# Patient Record
Sex: FEMALE | Race: WHITE | NOT HISPANIC OR LATINO | Employment: FULL TIME
[De-identification: names, ages, dates, MRNs, and addresses within clinical notes are randomized per-mention and may not be internally consistent; named-entity substitution may affect disease eponyms.]

---

## 2017-07-29 ENCOUNTER — HEALTH MAINTENANCE LETTER (OUTPATIENT)
Age: 52
End: 2017-07-29

## 2018-05-06 ENCOUNTER — HEALTH MAINTENANCE LETTER (OUTPATIENT)
Age: 53
End: 2018-05-06

## 2020-02-28 ENCOUNTER — APPOINTMENT (OUTPATIENT)
Age: 55
Setting detail: DERMATOLOGY
End: 2020-02-28

## 2020-02-28 DIAGNOSIS — Z41.9 ENCOUNTER FOR PROCEDURE FOR PURPOSES OTHER THAN REMEDYING HEALTH STATE, UNSPECIFIED: ICD-10-CM

## 2020-02-28 PROCEDURE — OTHER BOTOX: OTHER

## 2020-02-28 NOTE — PROCEDURE: BOTOX
Price (Use Numbers Only, No Special Characters Or $): 177 Price (Use Numbers Only, No Special Characters Or $): 205

## 2021-10-11 ENCOUNTER — TRANSFERRED RECORDS (OUTPATIENT)
Dept: HEALTH INFORMATION MANAGEMENT | Facility: CLINIC | Age: 56
End: 2021-10-11

## 2022-01-10 ENCOUNTER — APPOINTMENT (OUTPATIENT)
Dept: URBAN - METROPOLITAN AREA CLINIC 256 | Age: 57
Setting detail: DERMATOLOGY
End: 2022-01-10

## 2022-09-13 ENCOUNTER — TRANSFERRED RECORDS (OUTPATIENT)
Dept: HEALTH INFORMATION MANAGEMENT | Facility: CLINIC | Age: 57
End: 2022-09-13

## 2023-05-10 ENCOUNTER — TRANSFERRED RECORDS (OUTPATIENT)
Dept: HEALTH INFORMATION MANAGEMENT | Facility: CLINIC | Age: 58
End: 2023-05-10

## 2023-05-10 LAB
ALT SERPL-CCNC: 16 IU/L (ref 0–32)
ALT SERPL-CCNC: 16 IU/L (ref 0–32)
AST SERPL-CCNC: 20 IU/L (ref 0–40)
AST SERPL-CCNC: 20 IU/L (ref 0–40)
CHOLESTEROL (EXTERNAL): 225 MG/DL (ref 100–199)
CHOLESTEROL (EXTERNAL): 225 MG/DL (ref 100–199)
CREATININE (EXTERNAL): 0.78 MG/DL (ref 0.57–1)
CREATININE (EXTERNAL): 0.78 MG/DL (ref 0.57–1)
GFR ESTIMATED (EXTERNAL): 88 ML/MIN/1.73
GFR ESTIMATED (EXTERNAL): 88 ML/MIN/1.73
GLUCOSE (EXTERNAL): 83 MG/DL (ref 70–99)
GLUCOSE (EXTERNAL): 83 MG/DL (ref 70–99)
HBA1C MFR BLD: 5.6 % (ref 4.8–5.6)
HBA1C MFR BLD: 5.6 % (ref 4.8–5.6)
HDLC SERPL-MCNC: 78 MG/DL
HDLC SERPL-MCNC: 78 MG/DL
HEP C HIM: NORMAL
HEP C HIM: NORMAL
LDL CHOLESTEROL CALCULATED (EXTERNAL): 131 MG/DL (ref 0–99)
LDL CHOLESTEROL CALCULATED (EXTERNAL): 131 MG/DL (ref 0–99)
POTASSIUM (EXTERNAL): 3.8 MMOL/L (ref 3.5–5.2)
POTASSIUM (EXTERNAL): 3.8 MMOL/L (ref 3.5–5.2)
TRIGLYCERIDES (EXTERNAL): 93 MG/DL (ref 0–149)
TRIGLYCERIDES (EXTERNAL): 93 MG/DL (ref 0–149)
TSH SERPL-ACNC: 0.86 UIU/ML (ref 0.45–4.5)
TSH SERPL-ACNC: 0.86 UIU/ML (ref 0.45–4.5)

## 2023-05-23 ENCOUNTER — TRANSFERRED RECORDS (OUTPATIENT)
Dept: HEALTH INFORMATION MANAGEMENT | Facility: CLINIC | Age: 58
End: 2023-05-23

## 2023-06-07 ENCOUNTER — TRANSFERRED RECORDS (OUTPATIENT)
Dept: HEALTH INFORMATION MANAGEMENT | Facility: CLINIC | Age: 58
End: 2023-06-07

## 2023-09-29 ENCOUNTER — OFFICE VISIT (OUTPATIENT)
Dept: SLEEP MEDICINE | Facility: CLINIC | Age: 58
End: 2023-09-29
Payer: COMMERCIAL

## 2023-09-29 VITALS
DIASTOLIC BLOOD PRESSURE: 72 MMHG | HEART RATE: 69 BPM | WEIGHT: 167 LBS | HEIGHT: 68 IN | BODY MASS INDEX: 25.31 KG/M2 | OXYGEN SATURATION: 100 % | SYSTOLIC BLOOD PRESSURE: 107 MMHG

## 2023-09-29 DIAGNOSIS — G47.20 CIRCADIAN RHYTHM DISORDER: Primary | ICD-10-CM

## 2023-09-29 PROCEDURE — 99205 OFFICE O/P NEW HI 60 MIN: CPT | Performed by: INTERNAL MEDICINE

## 2023-09-29 ASSESSMENT — SLEEP AND FATIGUE QUESTIONNAIRES
HOW LIKELY ARE YOU TO NOD OFF OR FALL ASLEEP WHILE SITTING INACTIVE IN A PUBLIC PLACE: MODERATE CHANCE OF DOZING
HOW LIKELY ARE YOU TO NOD OFF OR FALL ASLEEP WHILE SITTING QUIETLY AFTER LUNCH WITHOUT ALCOHOL: HIGH CHANCE OF DOZING
HOW LIKELY ARE YOU TO NOD OFF OR FALL ASLEEP WHILE WATCHING TV: SLIGHT CHANCE OF DOZING
HOW LIKELY ARE YOU TO NOD OFF OR FALL ASLEEP WHILE SITTING AND READING: HIGH CHANCE OF DOZING
HOW LIKELY ARE YOU TO NOD OFF OR FALL ASLEEP WHEN YOU ARE A PASSENGER IN A CAR FOR AN HOUR WITHOUT A BREAK: HIGH CHANCE OF DOZING
HOW LIKELY ARE YOU TO NOD OFF OR FALL ASLEEP WHILE SITTING AND TALKING TO SOMEONE: SLIGHT CHANCE OF DOZING
HOW LIKELY ARE YOU TO NOD OFF OR FALL ASLEEP WHILE LYING DOWN TO REST IN THE AFTERNOON WHEN CIRCUMSTANCES PERMIT: HIGH CHANCE OF DOZING
HOW LIKELY ARE YOU TO NOD OFF OR FALL ASLEEP IN A CAR, WHILE STOPPED FOR A FEW MINUTES IN TRAFFIC: SLIGHT CHANCE OF DOZING

## 2023-09-29 NOTE — PATIENT INSTRUCTIONS

## 2023-09-29 NOTE — PROGRESS NOTES
"    Name: Angi Gonsalez MRN# [id number]   Age: 57 year old YOB: 1965   PCP Darrion Kebede MD  [facility]  [address] [place] [address]  \[place] [address]    Date of Consultation: September 29, 2023  Consultation is requested by: No referring provider defined for this encounter. No ref. provider found  Primary care provider: No primary care provider on file.       Reason for Sleep Consult:     Angi Gonsalez is sent by No ref. provider found for a sleep consultation regarding management            Assessment and Plan:     Summary Sleep Diagnoses:  History of idiopathic hypersomnolence  Current adjustment disorder with severe anxiety and comorbid depression    Comorbid Diagnoses:  Idiopathic hypersomnolence suggested on past testing with side effects on stimulant therapy     Summary Recommendations(things to be done):  Assessment of current sleep schedule with sleep diaries and actigraphy-results will be provided through [facility].  Return to clinic in 3 to 4 months.           HISTORY PRESENT ILLNESS:     Angi Gonsalez is a 57 year old year old with previously diagnosed idiopathic hypersomnolence based on multiple sleep latency test with average latency of 7.25 minutes over 4 naps without sleep onset REM and pre- study sleep of 7 hours without significant sleep-related breathing disorder or sleep disruption.  Actigraphy was not done at that time. She has not had cardinal symptoms of narcolepsy such as sleep paralysis, sleep hallucinations or cataplexy.      After starting on modafinil for idiopathic hypersomnolence diagnosis she noted overstimulation \"hyper\" overtalkative behavior and discontinued it.  Patient has been on modafinil since that time. She was subsequently started on CPAP for sleep apnea diagnosed in [place] but did not feel benefit and discontinued therapy.     She has had several significant setbacks including divorce and relocation living alone depending upon her own resources.  In this " setting she has acknowledged increasing anxiety, periods of difficulty concentrating and concerns about her work quality.  She has just received a work related reprimand and acknowledged difficulty concentrating and to a lesser extent daytime sleepiness.  She is reaching out in part asking whether treatment of her idiopathic hypersomnolence would be helpful and was cautioned that given her response to modafinil and worsening anxiety, stimulant therapy would not be recommended.   We also indicated her concerns regarding anxiety and she will lesser extent depressed mood from adjustment reaction are contributing to her symptoms.  We are strongly recommending she secure a primary mental health provider and feels she would also benefit from cognitive behavioral therapy. She was also requesting a family medical leave of indicated that this might be more completely completed by her local provider.     Current sleep schedule 9-10 pm bedtime now getting up at noon without specific work-related tasks with total sleep time up to 14 hours a day on those days.      LUCIAN-7    Over the last 2 weeks, how often have you been bothered by the following problems?   Not at all -0       Several days -1                  More than half the days-2   Nearly every day -3    1. Feeling nervous, anxious or on edge 3      2. Not being able to stop or control worrying 3     3. Worrying too much about different things 3     4. Trouble relaxing 3     5. Being so restless that it is hard to sit still 1     6. Becoming easily annoyed or irritable  1      7. Feeling afraid as if something awful might happen 3        Total___17___    Cut points for:   Mild Anxiety =  5  Moderate= 10  Severe=  15        PREVIOUS SLEEP TESTING:  DATE: 5/2/2012 followed by MSLT  Sleep monitoring, standard polysomnography including frontal, central, occipital EEG derivation, extraocular movements, chin EMG, modified lead II, limb EMGs, pulse oximetry, nasal pressure,  femoral couple airflow, and respiratory effort.  Body position and snoring were monitored throughout the procedure.        SLEEP ARCHITECTURE:  The total recording time was 530 minutes with a total sleep time of 431.5 minutes with a sleep latency of nearly 50 minutes for lights-out time at 9:34 p.m.  The sleep efficiency was 81.4% due to some prolonged awakenings.  Arousal frequency was high-normal in the range of 22 per hour; 7.6% were spent in stage 1, 79.1 % in stage 2, 9.2% in N3, and 4.1% in stage REM.        RESPIRATORY:  No significant sleep apnea with an apnea-hypopnea index of 0.1 per hour and no nocturnal hypoxemia.  Baseline saturation was 95% with the lowest saturation at 93%, and even including respiratory effort-related arousals, the overall respiratory disturbance index was only 6.7 per hour.        ASSESSMENT:  Normal sleep study without significant obstructive sleep apnea.  Prolonged initial sleep latency with normal sleep architecture.  Total sleep time prior to nap studies was 7 hours and 11.5 minutes.  Total [place] Score: 17   Multiple Sleep Latency Test (MSLT):   This study was performed in order to evaluate for daytime sleepiness and was performed after 7 hours and 11 minutes of sleep and a typical recent reported sleep times of 7-8 hours.  Patient was awoken at 6:24 am which is 1-2 hours prior to normal awakening though first latency is normal.  Actigraphy was not performed prior to testing.  Total number of naps performed: 4   Mean sleep latency: 7.25 mins.  Sleep-onset REM periods: 0  Other:   EEG: No epileptiform activity was noted during this recording.  EKG: No significant cardiac arrhythmias were noted.  UTOX: Not performed                 SCALES:       EPWORTH SLEEPINESS SCALE         6/6/2012     8:00 AM    [place] Sleepiness Scale ( CONY Valdez  3418-5121<br>ESS - [place]/English - Final version - 21 Nov 07 - [facility].)   [place] Score (Sleep) 8           STOP BANG          10/8/2013     1:46 PM   STOP BANG Questionnaire (  2008, the American Society of Anesthesiologists, Inc. [employer]   B/P Clinic: 98/62   BMI Clinic: 24.87         PATIENT HEALTH QUESTIONNAIRE-9 (PHQ - 9)        10/30/2012     8:00 AM   PHQ-9 ([employer])   No Interest In Doing Things 0   Feeling Depressed 1   Trouble Sleeping 1   Tired / No Energy 3   No appetite or Over-Eating 0   Feeling Bad about Self 0   Trouble Concentrating 1   Moving Slow or Restless 3   Suicidal Thoughts 0   Total Score 9       Developed by Sb Mcguire, Aishwarya Blackmon, Joe Davenport and colleagues, with an educational anabelle from [employer] No permission required to reproduce, translate, display or distribute.        Allergies:    Allergies   Allergen Reactions    Morphine And Related     Penicillins     Sulfa Antibiotics             Problem List:     Patient Active Problem List   Diagnosis    Idiopathic hypersomnolence    Anxiety    Mild recurrent major depression (H)    Insomnia    Chronic allergic conjunctivitis since 2011    Hyperlipidemia LDL goal <130            MEDICATIONS:     Current Outpatient Medications   Medication Sig Dispense Refill    azithromycin (ZITHROMAX) 250 MG tablet Two tablets first day, then one tablet daily for four days 6 tablet 0    citalopram (CELEXA) 10 MG tablet Take 1 tablet (10 mg) by mouth daily 30 tablet 0    citalopram (CELEXA) 20 MG tablet Take 1 tablet (20 mg) by mouth daily 90 tablet 0    estradiol (ESTRACE) 1 MG tablet Take 1 tablet (1 mg) by mouth daily 90 tablet 0    30 tablet 0    metroNIDAZOLE (FLAGYL) 500 MG tablet Take 1 tablet (500 mg) by mouth 2 times daily 14 tablet 0        Problem List:  Patient Active Problem List    Diagnosis Date Noted    Chronic allergic conjunctivitis since 2011 08/20/2013     Priority: Medium    Hyperlipidemia LDL goal <130 08/20/2013     Priority: Medium    Anxiety 02/12/2013     Priority: Medium    Mild recurrent major depression  (H) 02/12/2013     Priority: Medium    Insomnia 02/12/2013     Priority: Medium    Idiopathic hypersomnolence 06/06/2012     Priority: Medium        Past Medical/Surgical History:  No past medical history on file.  Past Surgical History:   Procedure Laterality Date    COLONOSCOPY  3/6/2013    Procedure: COLONOSCOPY;;  Surgeon: Juan Daniel Sahu MD;  Location:  GI    GYN SURGERY      hyst    TUBAL LIGATION         Social History:  Social History     Socioeconomic History    Marital status:      Spouse name: Not on file    Number of children: Not on file    Years of education: Not on file    Highest education level: Not on file   Occupational History    Not on file   Tobacco Use    Smoking status: Never    Smokeless tobacco: Never   Substance and Sexual Activity    Alcohol use: Yes     Comment: 1/every two weeks    Drug use: No    Sexual activity: Yes     Partners: Male   Other Topics Concern    Parent/sibling w/ CABG, MI or angioplasty before 65F 55M? Not Asked   Social History Narrative    Not on file     Social Determinants of Health     Financial Resource Strain: Not on file   Food Insecurity: Not on file   Transportation Needs: Not on file   Physical Activity: Not on file   Stress: Not on file   Social Connections: Not on file   Interpersonal Safety: Not on file   Housing Stability: Not on file       Family History:  Family History   Problem Relation Age of Onset    Cancer Father         bladder    Cancer Maternal Grandfather         liver    Cancer - colorectal Paternal Aunt     Cancer - colorectal Paternal Uncle             Physical Examination:     Mandibular profile    GENERAL: Healthy, alert and no distress  EYES: Eyes grossly normal to inspection.  No discharge or erythema, or obvious scleral/conjunctival abnormalities.  RESP: No audible wheeze, cough, or visible cyanosis.  No visible retractions or increased work of breathing.    SKIN: Visible skin clear. No significant rash, abnormal pigmentation  or lesions.  NEURO: Cranial nerves grossly intact.  Mentation and speech appropriate for age.  PSYCH: Mentation appears normal, affect normal/bright, judgement and insight intact, normal speech and appearance well-groomed.           LOTTIE GAYLE MD 9/29/2023     Total time spent reviewing medical records including previous testing and interpretation as well as direct patient contact and documentation on this date: 60 min

## 2023-10-02 ENCOUNTER — TELEPHONE (OUTPATIENT)
Dept: SCHEDULING | Facility: CLINIC | Age: 58
End: 2023-10-02
Payer: COMMERCIAL

## 2023-10-02 NOTE — TELEPHONE ENCOUNTER
General Call    Contacts         Type Contact Phone/Fax    10/02/2023 01:50 PM CDT Phone (Outgoing) Angi Canales (Self) [phone] (H)          Reason for Call:  Pt needing sleep study scheduled, actigraphy noted on order. Please contact.     What are your questions or concerns:  na    Date of last appointment with provider: na    Okay to leave a detailed message?: Yes at Cell number on file:    Telephone Information:   Mobile [phone]

## 2023-10-03 ENCOUNTER — HOSPITAL ENCOUNTER (EMERGENCY)
Facility: CLINIC | Age: 58
Discharge: HOME | End: 2023-10-03
Attending: FAMILY MEDICINE | Admitting: FAMILY MEDICINE
Payer: COMMERCIAL

## 2023-10-03 ENCOUNTER — TELEPHONE (OUTPATIENT)
Dept: EMERGENCY MEDICINE | Facility: CLINIC | Age: 58
End: 2023-10-03
Payer: COMMERCIAL

## 2023-10-03 VITALS
OXYGEN SATURATION: 98 % | RESPIRATION RATE: 16 BRPM | HEART RATE: 67 BPM | SYSTOLIC BLOOD PRESSURE: 104 MMHG | DIASTOLIC BLOOD PRESSURE: 68 MMHG | TEMPERATURE: 98.6 F

## 2023-10-03 DIAGNOSIS — F41.8 DEPRESSION WITH ANXIETY: ICD-10-CM

## 2023-10-03 PROBLEM — F32.9 MAJOR DEPRESSION: Status: ACTIVE | Noted: 2023-10-03

## 2023-10-03 PROCEDURE — 99284 EMERGENCY DEPT VISIT MOD MDM: CPT | Performed by: FAMILY MEDICINE

## 2023-10-03 PROCEDURE — 99283 EMERGENCY DEPT VISIT LOW MDM: CPT | Performed by: FAMILY MEDICINE

## 2023-10-03 ASSESSMENT — ACTIVITIES OF DAILY LIVING (ADL)
ADLS_ACUITY_SCORE: 35
ADLS_ACUITY_SCORE: 33
ADLS_ACUITY_SCORE: 35

## 2023-10-03 NOTE — CONSULTS
Diagnostic Evaluation Consultation  Crisis Assessment    Patient Name: Angi Canales  Age:  57 year old  Legal Sex: female  Gender Identity: female  Race: White  Ethnicity: Choose not to answer  Language: English      Patient was assessed: In person      Patient location: [facility] EMERGENCY DEPARTMENT                                 Referral Data and Chief Complaint  Angi Canales presents to the ED by  self. Patient is presenting to the ED for the following concerns: Depression.   Factors that make the mental health crisis life threatening or complex are:  Patient endorses significant recent stressors including her father's death, divorce from her , relocating to [place] from [place], finding a new apartment and starting a new job.    Informed Consent and Assessment Methods  Explained the crisis assessment process, including applicable information disclosures and limits to confidentiality, assessed understanding of the process, and obtained consent to proceed with the assessment.  Assessment methods included conducting a formal interview with patient, review of medical records, collaboration with medical staff, and obtaining relevant collateral information from family and community providers when available.  : done     Patient response to interventions: eager to participate, acceptance expressed, verbalizes understanding  Coping skills were attempted to reduce the crisis:  Patient has reached out to her [facility], [facility] adult walk in clinic, and primary care provider. Patient scheduled her own individual therapy and medication management but report these appointments are not until the end of the month.     History of the Crisis   Patient endorses significant recent stressors including her father's death, divorce from her , relocating to [place] from [place], finding a new apartment and starting a new job.Patient reports memory concerns such as forgetting where she is while  "driving and making \"sloppy\" spreadsheets at work. Patient reports she had a normal brain MRI but continues to struggle with \"comprehending, learning, and retaining\". Patient reports inability to \"get my thoughts together\". Patient reports seeing new providers this past week who suggested her concerns might be mental health related. Patient reports this was a \"real wake up call\" and she is having a \"hard time accepting\" the news. Patient reports contacting [employer] who suggested a FMLA leave. Patient reports the soonest outpatient therapy and medication management appointments she could find were a month out. Patient reports calling the [facility] walk in crisis center and ultimately presenting to our emergency department. Patient reports she is \"spiraling\" and needing help now.    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 she can \"barely function at work\")  Source of Income:  salary/wages  Financial Environmental Concerns:  unable to afford rent/mortgage  Current Hobbies:  family functions  Barriers in Personal Life:  mental health concerns, financial concerns, medical conditions/precautions    Significant Clinical History  Current Anxiety Symptoms:  anxious, excessive worry, racing thoughts  Current Depression/Trauma:  sense of doom, difficulty concentrating, crying or feels like crying, impaired decision making, helplessness  Current Somatic Symptoms:     Current Psychosis/Thought Disturbance:  forgetful, distractability  Current Eating Symptoms:     Chemical Use History:  Alcohol: Social (\"mindful drinking\" 3 days a week)  Benzodiazepines: None  Opiates: None  Cocaine: None  Marijuana: None  Other Use: None   Past diagnosis:  Anxiety Disorder, Depression  Family history:   (ex- with psychiatric " hospitalizations.)  Past treatment:  No known formal treatment attempts  Details of most recent treatment:  Patient denies any established outpatient mental health or medical providers at this time. Patient reports establishing primary care in [place] earlier this week.  Other relevant history:        Collateral Information  Is there collateral information:  Unclear who patient's emergency contact is in relation to patient, so  did not call.     Risk Assessment  [place] Suicide Severity Rating Scale Full Clinical Version:  Suicidal Ideation  Q1 Wish to be Dead (Lifetime): No  Q2 Non-Specific Active Suicidal Thoughts (Lifetime): No     Suicidal Behavior (Lifetime)  Actual Attempt (Lifetime): No  Has subject engaged in non-suicidal self-injurious behavior? (Lifetime): No  Interrupted Attempts (Lifetime): No  Aborted or Self-Interrupted Attempt (Lifetime): No  Preparatory Acts or Behavior (Lifetime): No    [place] Suicide Severity Rating Scale Recent:   Suicidal Ideation (Recent)  Q1 Wished to be Dead (Past Month): no  Q2 Suicidal Thoughts (Past Month): no  Q3 Suicidal Thought Method: no  Q4 Suicidal Intent without Specific Plan: no  Q5 Suicide Intent with Specific Plan: no  Level of Risk per Screen: low risk     Suicidal Behavior (Recent)  Actual Attempt (Past 3 Months): No  Has subject engaged in non-suicidal self-injurious behavior? (Past 3 Months): No  Interrupted Attempts (Past 3 Months): No  Aborted or Self-Interrupted Attempt (Past 3 Months): No  Preparatory Acts or Behavior (Past 3 Months): No    Environmental or Psychosocial Events: helplessness/hopelessness, loss of a relationship due to divorce/separation, loss of a loved one  Protective Factors: Protective Factors: help seeking    Does the patient have thoughts of harming others? Feels Like Hurting Others: no  Previous Attempt to Hurt Others: no  Is the patient engaging in sexually inappropriate behavior?: no    Is the patient engaging in  sexually inappropriate behavior?  no        Mental Status Exam   Affect: Appropriate  Appearance: Appropriate  Attention Span/Concentration: Attentive  Eye Contact: Engaged    Fund of Knowledge: Appropriate   Language /Speech Content: Fluent  Language /Speech Volume: Normal  Language /Speech Rate/Productions: Normal  Recent Memory: Intact  Remote Memory: Intact  Mood: Depressed  Orientation to Person: Yes   Orientation to Place: Yes  Orientation to Time of Day: Yes  Orientation to Date: Yes     Situation (Do they understand why they are here?): Yes  Psychomotor Behavior: Normal  Thought Content: Clear  Thought Form: Intact       Medication  Psychotropic medications:   Medication Orders - Psychiatric (From admission, onward)      None             Current Care Team  Patient Care Team:  No Ref-Primary, Physician as PCP - General    Diagnosis  Patient Active Problem List   Diagnosis Code    Idiopathic hypersomnolence G47.11    Anxiety F41.9    Mild recurrent major depression (H24) F33.0    Insomnia G47.00    Chronic allergic conjunctivitis since 2011 H10.45    Hyperlipidemia LDL goal <130 E78.5    Major depression F32.9       Primary Problem This Admission  Active Hospital Problems    *Major depression        Clinical Summary and Substantiation of Recommendations   Patient is alert and oriented x4. Patient is adequately dressed and groomed. Patient has articulate speech with normal rate and volume. Patient has depressed mood and normal affect. Patient was cooperative with the assessment process. Patient cites numerous stressor(s) including father's passing, marital divorce, selling their home, relocating to [place] from [place], starting a new job, living with her cousin until her apartment is ready next week. Patient reports she has been trying to establish primary care, therapy and medication management. Patient is also trying to obtain FMLA. Patient has reached out to employee assistance, crisis centers, and  "scheduled outpatient mental health appointments that are weeks out. Patient endorses worsening anxiety, feeling as though she is \"spiraling\" and \"can't turn her head off\". Patient denies any suicidal thought,  homicidal thought, or psychosis. Patient endorses history of alcohol abuse, practices \"mindful drinking\" 3 days per week now. Patient is seeking intensive mental health resources. Patient was offered programmatic care, sooner medication management appointment, and transition clinic which she is in agreement with.       Patient coping skills attempted to reduce the crisis:  Patient has reached out to her [facility], [facility] walk in clinic, and primary care provider. Patient scheduled her own individual therapy and medication management but report these appointments are not until the end of the month.    Disposition  Recommended disposition: Medication Management, Programmatic Care        Reviewed case and recommendations with attending provider. Attending Name: Dr. Salvador Hamilton       Attending concurs with disposition: yes       Patient and/or validated legal guardian concurs with disposition:   yes       Final disposition:  discharge    Legal status on admission:      Assessment Details   Total duration spent with the patient: 30 min     CPT code(s) utilized: 41043 - Psychotherapy for Crisis - 60 (30-74*) min    RAYMUNDO García, Psychotherapist  [facility] - Triage & Transition Services  Callback: [phone]  "

## 2023-10-03 NOTE — ED PROVIDER NOTES
"ED Provider Note  [facility]      History     Chief Complaint   Patient presents with    Depression    Anxiety     HPI  Angi Canales is a 57 year old female who has a history of depression, anxiety, idiopathic hypersomnia.  Patient recently developing progressively worsening depression and anxiety due to multiple stressors.  She started proceedings for divorce in May, and moved from [place] back to [place].  She is renting a room from her cousin.  She works in customer relations and is having a hard time completing her activity necessary for her job due to her mental health symptoms.  Patient reports anhedonia, sleeping poorly, difficulty concentrating, feelings of hopelessness and worthlessness.  She denies any suicidal thoughts or symptoms of psychosis.  She does not use drugs or alcohol.  She went to her sleep specialist, who referred her to her primary doctor.  She saw the primary doctor for the first time, who referred her to a therapist and a psychiatrist but she could not get appointments until later in the month.  The doctor gave her short-term work release but stated he could not help her with FMLA paperwork because she was a new patient without an established relationship with the physician and will need to see the psychiatrist and the therapist for those decisions and paperwork.  She subsequently went to the [facility] walk-in clinic who stated they could not help her.  She also spoke with the person from her employee assistance program who was unhelpful.  She is fearful of losing her job due to inability to perform the activities of her job related to her mental health symptoms.  At this point she stated \"I do not know what else to do so I walked into the hospital\".    Past Medical History  History reviewed. No pertinent past medical history.  Past Surgical History:   Procedure Laterality Date    COLONOSCOPY  3/6/2013    Procedure: COLONOSCOPY;;  " Surgeon: Juan Daniel Sahu MD;  Location:  GI    GYN SURGERY      hyst    TUBAL LIGATION       citalopram (CELEXA) 20 MG tablet  estradiol (ESTRACE) 1 MG tablet      Allergies   Allergen Reactions    Morphine And Related     Penicillins     Sulfa Antibiotics      Family History  Family History   Problem Relation Age of Onset    Cancer Father         bladder    Cancer Maternal Grandfather         liver    Cancer - colorectal Paternal Aunt     Cancer - colorectal Paternal Uncle      Social History   Social History     Tobacco Use    Smoking status: Never    Smokeless tobacco: Never   Substance Use Topics    Alcohol use: Yes     Comment: 1/every two weeks    Drug use: No         A medically appropriate review of systems was performed with pertinent positives and negatives noted in the HPI, and all other systems negative.    Physical Exam   BP: 123/79  Pulse: 65  Temp: 96.8  F (36  C)  Resp: 14  SpO2: 99 %  Physical Exam  General: Patient is in no acute distress and is resting comfortably.  HEENT: Normocephalic atraumatic  Neck: Supple  Cardiovascular: Heart rate normal  Pulmonary: Patient is in no respiratory distress  Extremities: No signs of any significant or life-threatening trauma.  Neurologic: No new focal neurologic deficits.  Psychiatric: Depressed mood flat affect.  Speech fluent, English.  Thought processes logical.  No loose associations.  Thought content normal.  Insight and judgment normal.  No suicidal or homicidal ideation.  No psychosis.    ED Course, Procedures, & Data      Procedures                     No results found for any visits on 10/03/23.  Medications - No data to display  Labs Ordered and Resulted from Time of ED Arrival to Time of ED Departure - No data to display  No orders to display          Critical care was not performed.     Medical Decision Making  The patient's presentation was of moderate complexity (a chronic illness mild to moderate exacerbation, progression, or side effect of  treatment).    The patient's evaluation involved:  ordering and/or review of 1 test(s) in this encounter (see separate area of note for details)  discussion of management or test interpretation with another health professional (DEC )    The patient's management necessitated high risk (a decision regarding hospitalization).    Assessment & Plan    A 57-year-old patient with a history of depression and anxiety which is growing progressively worse since May, and specifically worse in the last 2 weeks.  He has multiple symptoms of major depression and a lot of anxiety around her social situation and her job.  Is not suicidal, homicidal, nor psychotic and has no issues with substance abuse.  Her symptoms are quite disabling and making her unable to perform her work which is causing an increase in her symptoms.  Ultimately attempts at access to outpatient services have not provided her with a satisfactory benefit or resolved and so she presents to the ED.  The patient was also seen by the [facility] , please refer to their extensive note/evaluation which was reviewed with me and is documented in EPIC on 10/3/2023 for further details.  Who is significantly impaired by depression and anxiety, numerous stressors, exacerbated by employment status, housing status, relation relationship problems.  She is not suicidal or homicidal and does not have any psychotic symptoms.  There are no substance use issues.  She is appropriate for outpatient referrals.  She has significant concerns regarding FMLA paperwork.  This is more appropriately given by outpatient providers with continuity.  I did give her a temporary work letter in hopes that this will bridge until her FMLA with can be filled out by the appropriate providers.  We discussed the indications for emergency department return and follow-up.  Stable for discharge.      I have reviewed the nursing notes. I have reviewed the findings, diagnosis, plan and need for follow  up with the patient.    New Prescriptions    No medications on file       Final diagnoses:   Depression with anxiety       Salvador Hamilton MD  [facility] EMERGENCY DEPARTMENT  10/3/2023     Salvador Hamilton MD  10/03/23 1522

## 2023-10-03 NOTE — TELEPHONE ENCOUNTER
Pt is a(n) adult (18+ out of HS) Seeking as eval for Adult Mental Health DA for evaluation and recommendations..  Appointment scheduled by:  Patient.  (self-pay - complete Cost Estimate)  Caller name:  Angi Canales    Caller phone #: [phone]  Legal Guardianship Reviewed?  No  Honoring Choices Notified?  No  Brief reason for appt:  DA for programmatic care recommendations and referrals     needed?  NO    Contact information verified/updated: Yes    Treva Boyer

## 2023-10-03 NOTE — DISCHARGE INSTRUCTIONS
Mental Health Resources and Appointments     Diagnostic Assessment - [facility]  A diagnostic assessment has been scheduled for the [facility] Child/Adolescent Mental Health Programming on the date of 10/10/23 at 8:00 AM.  This appointment will be conducted in person at our [place] Location. The [facility] has been given your contact information and will reach out to you to confirm your appointment. You may ask them insurance coverage questions.      Location Instructions  The Assessment Center is located at the [facility], [facility], on the [facility].    Please come to the [facility] entrance near the Emergency Department.  Follow the signs to the Emergency Room and park in the RED or YELLOW parking ramp.  Enter in the [facility].  The Assessment Center is next to [address].    -If getting a ride, please request drop off at the address above.  -If driving, discounted parking is available in the Red or Yellow ramp across from the [facility] entrance.    Our reception area is conveniently located as you come into the [facility] in [address], next door to [address].  Please bring a current list of medication and contact information for your other providers.    Please note, we do ask for a minimum of a 24-hour notice for canceled or rescheduled appointments     Department Address  [address] [place] [id number]     If you need to reschedule or cancel this appointment, please call Behavioral Access at [phone]. If you must cancel, please call at least 24 hours prior to your scheduled appointment. Please note that this assessment is needed to determine eligibility for [facility]. If you are determined to not be an appropriate fit for programming, please feel free to call [facility] at [phone] to speak with one of our coordinators on other programming options.       Medication Management Appointment  Date: Tuesday, 10/10/2023  Time: 1:00 pm - 2:00 pm  Provider: Cynthia DE GUZMAN  CNP,RN  Location: [facility] [address], [address], [place] [address]  Phone: [phone]  Type: Medication Mgmt - Initial (In-Person)    Patient Instructions  NEW PATIENT PAPERWORK WILL NEED TO BE COMPLETED 24 HRS BEFORE APPOINTMENT..ID & INSURANCE CARDS WILL NEED TO BE PRESENT FOR THE APPOINTMENT!!    Aftercare Plan    If I am feeling unsafe or I am in a crisis, I will:   Contact my established care providers   Call the [place] Suicide Prevention Lifeline: 988  Go to the nearest emergency room   Call 911     Warning signs that I or other people might notice when a crisis is developing for me: any worsening mental health or substance use concerns    Things I am able to do on my own to cope or help me feel better: Grounding Techniques:  Try to notice where you are, your surroundings including the people, the sounds like the TV or radio.  Concentrate on your breathing. Take a deep cleansing breath from your diaphragm. Count the breaths as you exhale. Make sure you breath slowly.  Hold something that you find comforting, for some it may be a stuffed animal or a blanket. Notice how it feels in your hands. Is it hard or soft?  During a non-crisis time make a list of positive affirmations. Print them out and keep them handy for times of intense anxiety. At those times, read them aloud.  Try the [employer] game:  Name 5 things you can see in the room with you  Name 4 things you can feel ( chair on my back  or  feet on floor )   Name 3 things you can hear right now ( people talking  or  tv )   Name 2 things you can smell right now (or, 2 things you like the smell of)   Name 1 good thing about yourself  Create A Safe Place  Image a safe place -- it can be a real or imaginary place:   What do you see -- especially colors?   What sounds do you hear?   What sensations do you feel?    What smells do you smell?   What people or animals would you want in your safe place?   Imagine a protective bubble, wall or boundary around your safe place.   Imagine a door or gate with a guard at your safe place.   Image a lock and key to your safe place and only you can unlock it.  You can draw or make a collage that represents your safe place.   Choose a souvenir of your safe place -- a color, an object, a song.   Keep your image of your safe place so you can come back to it when you need to.    Things that I am able to do with others to cope or help me better: let your family know if you are needing more support     Things I can use or do for distraction: Reduce Extreme Emotion  QUICKLY:  Changing Your Body Chemistry      T:  Change your body Temperature to change your autonomic nervous system   Use Ice Water to calm yourself down FAST   Put your face in a bowl of ice water (this is the best way; have the person keep his/her face in ice water for 30-45 seconds - initial research is showing that the longer s/he can hold her/his face in the water, the better the response), or   Splash ice water on your face, or hold an ice pack on your face      I:  Intensely exercise to calm down a body revved up by emotion   Examples: running, walking fast, jumping, playing basketball, weight lifting, swimming, calisthenics, etc.   Engage in exercises that DO NOT include violent behaviors. Exercises that utilize violent behaviors tend to function as  behavioral rehearsal,  and rather than calming the person down, may actually  rev  the person up more, increasing the likelihood of violence, and lessening the likelihood that they will  burn off  energy     P:  Progressively relax your muscles   Starting with your hands, moving to your forearms, upper arms, shoulders, neck, forehead, eyes, cheeks and lips, tongue and teeth, chest, upper back, stomach, buttocks, thighs, calves, ankles, feet   Tense (10 seconds,   of the way),  "then relax each muscle (all the way)   Notice the tension   Notice the difference when relaxed (by tensing first, and then relaxing, you are able to get a more thorough relaxation than by simply relaxing)     P: Paced breathing to relax   The standard technique is to begin with counting the number of steps one takes for a typical inhale, then counting the steps one takes for a typical exhale, and then lengthening the amount of steps for the exhalation by one or two steps.  OR  Repeat this pattern for 1-2 minutes  Inhale for four (4) seconds   Exhale for six (6) to eight (8) seconds   Research demonstrated that one can change one's overall level of anxiety by doing this exercise for even a few minutes per day     Changes I can make to support my mental health and wellness: begin mental health treatment programming and medication management as discussed    People in my life that I can ask for help: primary care provider    Your [facility] has a mental health crisis team you can call 24/7: [facility]  [phone]    Other things that are important when I'm in crisis: continue taking medication as prescribed     Additional resources and information:     Crisis Lines  Crisis Text Line  Text 441444  You will be connected with a trained live crisis counselor to provide support.    Por espanol, texto  RAE a 059821 o texto a 442-AYUDAME en WhatsApp    The [employer] Project (LGBTQ Youth Crisis Line)  9.017.747.3419  text START to 746-383      [employer]  Fast Tracker  Linking people to mental health and substance use disorder resources  fasttrackClusterSevenn.org     [place] Mental Health Warm Line  Peer to peer support  Monday thru Saturday, 12 pm to 10 pm  480.232.8669 or 3.011.345.9794  Text \"Support\" to 43755    National [place] on Mental Illness (MACRINA)  038.858.1205 or 1.888.MACRINA.HELPS      Mental Health Apps  My3  https://myFloor64.org/    [employer]  " https://Promolta/apps/virtual-hope-box/      Additional Information  Today you were seen by a licensed mental health professional through Triage and Transition services, [facility] (P)  for a crisis assessment in the Emergency Department at [facility].  It is recommended that you follow up with your established providers (psychiatrist, mental health therapist, and/or primary care doctor - as relevant) as soon as possible. Coordinators from [facility] will be calling you in the next 24-48 hours to ensure that you have the resources you need.  You can also contact [facility] coordinators directly at [phone]. You may have been scheduled for or offered an appointment with a mental health provider. [facility] maintains an extensive network of licensed behavioral health providers to connect patients with the services they need.  We do not charge providers a fee to participate in our referral network.  We match patients with providers based on a patient's specific needs, insurance coverage, and location.  Our first effort will be to refer you to a provider within your care system, and will utilize providers outside your care system as needed.

## 2023-10-03 NOTE — LETTER
October 3, 2023      To Whom It May Concern:      Angi Canales was seen in our Emergency Department today, 10/03/23.  I expect her condition to improve over the next 7 days.  Her condition may require even further time away from work, this is to be determined at her follow-up appointments.  She may return to work/school when the patient providers deem her appropriately improved.    Sincerely,        Salvador Hamilton MD

## 2023-10-03 NOTE — ED TRIAGE NOTES
pt here for mental health; pt admits to increased anxiety, depression, hard time functioning home and work, increased sleeping; Denies SI, hallucinations, paranoia, drug use;

## 2023-10-04 ENCOUNTER — TELEPHONE (OUTPATIENT)
Dept: BEHAVIORAL HEALTH | Facility: CLINIC | Age: 58
End: 2023-10-04
Payer: COMMERCIAL

## 2023-10-04 NOTE — TELEPHONE ENCOUNTER
First attempt to contact patient.Writer spoke with patient who stated they were going to call insurance and Cost of Care Line: [phone] to check coverage. Patient stated they would call back if appointment was needed. TC contact provided.    Candi Moya  10/04/2023  330

## 2023-10-04 NOTE — TELEPHONE ENCOUNTER
----- Message from Treva Boyer sent at 10/3/2023  8:21 PM CDT -----  Regarding: Patient Referral  Transition Clinic Referral   [place]         Please Check Type of Referral Requested:       __x__THERAPY: The Transition clinic is able to schedule patients without current medical insurance; these patient will be referred to our Social Work Care Coordinator for Medical Insurance              Assistance. We are open for referral for psychotherapy. Patient is referred from:  [facility]      ____MEDICATION:  Referrals for Medication are ONLY accepted from the following areas (select): Emergency Department/Urgent Care - HIGH PRIORITY                                       Suboxone and Opioid Management Referrals are automatically denied. TC Psychiatry cannot see patient without active medical insurance.      Next level of psychiatry care must be within 12 months.  TC Psychiatry cannot accept patient with next level of care scheduled with PCP.  The transition clinic cannot follow patients who are on a restricted recipient program.    GUARDIAN: If your patient is not their own Guardian, please provide the following:    Guardian Name:  Guardian Contact Information (Phone & Email) :  Guardian Address:     FOSTER CARE PROVIDER: If your patient lives at a Licensed Foster Care, please provide the following:    Foster Provider:  Foster Provider Contact Information (Phone & Email):  Foster Provider Address:       Referring Provider Contact Name: Jorge Ngo; Phone Number: [phone]    Reason for Transition Clinic Referral: Therapeutic support between ED visit and scheduled DA and psychiatry on 10/10/23.     Next Level of Care Patient Will Be Transitioned To: DA and psychiatry  Provider(s): [facility] and [facility] / Cynthia Luo  Location :   Date/Time DA on 10/10 at 8 am and psych on 10/10 at 1pm    What Would Be Helpful from the Transition Clinic: See DEC     Needs:  NO    Does Patient Have Access to Technology: yes    Patient E-mail Address: [email or web address]    Current Patient Phone Number: [phone];     Clinician Gender Preference (if applicable): NO    Patient location preference: Virtual    Treva Grussjeffrey

## 2023-10-08 ASSESSMENT — ANXIETY QUESTIONNAIRES
4. TROUBLE RELAXING: NEARLY EVERY DAY
1. FEELING NERVOUS, ANXIOUS, OR ON EDGE: NEARLY EVERY DAY
3. WORRYING TOO MUCH ABOUT DIFFERENT THINGS: NEARLY EVERY DAY
IF YOU CHECKED OFF ANY PROBLEMS ON THIS QUESTIONNAIRE, HOW DIFFICULT HAVE THESE PROBLEMS MADE IT FOR YOU TO DO YOUR WORK, TAKE CARE OF THINGS AT HOME, OR GET ALONG WITH OTHER PEOPLE: EXTREMELY DIFFICULT
2. NOT BEING ABLE TO STOP OR CONTROL WORRYING: NEARLY EVERY DAY
GAD7 TOTAL SCORE: 19
GAD7 TOTAL SCORE: 19
6. BECOMING EASILY ANNOYED OR IRRITABLE: MORE THAN HALF THE DAYS
5. BEING SO RESTLESS THAT IT IS HARD TO SIT STILL: MORE THAN HALF THE DAYS
7. FEELING AFRAID AS IF SOMETHING AWFUL MIGHT HAPPEN: NEARLY EVERY DAY

## 2023-10-09 ASSESSMENT — PATIENT HEALTH QUESTIONNAIRE - PHQ9
SUM OF ALL RESPONSES TO PHQ QUESTIONS 1-9: 22
10. IF YOU CHECKED OFF ANY PROBLEMS, HOW DIFFICULT HAVE THESE PROBLEMS MADE IT FOR YOU TO DO YOUR WORK, TAKE CARE OF THINGS AT HOME, OR GET ALONG WITH OTHER PEOPLE: EXTREMELY DIFFICULT
SUM OF ALL RESPONSES TO PHQ QUESTIONS 1-9: 22

## 2023-10-10 ENCOUNTER — HOSPITAL ENCOUNTER (OUTPATIENT)
Dept: BEHAVIORAL HEALTH | Facility: CLINIC | Age: 58
Discharge: HOME | End: 2023-10-10
Attending: FAMILY MEDICINE | Admitting: FAMILY MEDICINE
Payer: COMMERCIAL

## 2023-10-10 PROCEDURE — 90791 PSYCH DIAGNOSTIC EVALUATION: CPT | Performed by: COUNSELOR

## 2023-10-10 ASSESSMENT — PAIN SCALES - GENERAL: PAINLEVEL: NO PAIN (0)

## 2023-10-10 ASSESSMENT — COLUMBIA-SUICIDE SEVERITY RATING SCALE - C-SSRS
5. HAVE YOU STARTED TO WORK OUT OR WORKED OUT THE DETAILS OF HOW TO KILL YOURSELF? DO YOU INTEND TO CARRY OUT THIS PLAN?: NO
1. IN THE PAST MONTH, HAVE YOU WISHED YOU WERE DEAD OR WISHED YOU COULD GO TO SLEEP AND NOT WAKE UP?: NO
4. HAVE YOU HAD THESE THOUGHTS AND HAD SOME INTENTION OF ACTING ON THEM?: NO
2. HAVE YOU ACTUALLY HAD ANY THOUGHTS OF KILLING YOURSELF IN THE PAST MONTH?: NO
6. HAVE YOU EVER DONE ANYTHING, STARTED TO DO ANYTHING, OR PREPARED TO DO ANYTHING TO END YOUR LIFE?: NO
3. HAVE YOU BEEN THINKING ABOUT HOW YOU MIGHT KILL YOURSELF?: NO

## 2023-10-10 NOTE — PROGRESS NOTES
[address] [address]      10/10/2023      Angi Canales  [address] [facility] [address]      Dear Ms. Canales    This is to verify that Angi Canales ([email or web address]) was here for an evaluation for Mental Health DIAGNOSTIC ASSESSMENT on 10/10/23.     Recommendation:  It is recommended that patient completes an intensive mental health (IOP 55+) program at [facility] 4 days a week for a period of 9 weeks.  In the meantime, patient is encouraged to talk to her employer regarding taking time off work to address her mental health concerns.        If we can be of further service, please don't hesitate to call.     Sincerely.        Senthil Poe MA, LPCC, LADC, Evaluation Counselor   [address] [address]   [email or web address]   Tel: [phone]  Fax: [phone]   Gender pronouns: he/him   Employed by: [employer]

## 2023-10-10 NOTE — PROGRESS NOTES
"    [facility]      PATIENT'S NAME: Angi Canales  PREFERRED NAME: Angi  PRONOUNS:   she/her    MRN: [id number]  : 1965  ADDRESS: [address] [place] [address] [address] [facility] [address]  ACCT. NUMBER:  [id number]  DATE OF SERVICE: 10/10/23  START TIME: 8:00 AM  END TIME: 9:47 AM  PREFERRED PHONE: [phone]  May we leave a program related message: Yes  SERVICE MODALITY:  In-person    UNIVERSAL ADULT Mental Health DIAGNOSTIC ASSESSMENT    Identifying Information:  Patient is a 57 year old,  individual.  Patient was referred for an assessment by  [facility].  Patient attended the session alone.    Chief Complaint:   The reason for seeking services at this time is: \"Anxiety and depression\". Patient endorses significant recent stressors including her father's death, divorce from her , relocating to [place] from [place], living with her cousin and moving to an apartment today. Patient reports memory concerns such as forgetting where she is while driving and making \"sloppy\" spreadsheets at work. Patient is also trying to obtain FMLA and short term disability to focus seeking intensive mental health services. Patient endorses debilitating anxiety lasting for days, to the point she is not leaving her house inability to get my thoughts together, feeling as though she is \"spiraling\" and \"can't turn her head off\". Patient reports depression off and on, currently affecting her personal life, not able to work to the point her tavon was concerned about her performance, no motivation, making mistakes on the job, goes to sleep while working, crying all the time, really sensitive and anger outburst with people for no reason. Patient endorses history of alcohol abuse, practices \"mindful drinking\" 3 days per week now and does not keep alcohol in the house.      The problem(s) began 22.    Patient has attempted to resolve these concerns in the past " "through Therapy twice in the past 10 years with , myself in 3 different occasions .    Social/Family History:  Patient reported that she grew up in [facility] [facility]  . She was raised by biological mother who was an alcoholic, I basically raised myself after parents got  and took care of her .  Parents one or both remarried.  Patient reported that her childhood was hard.  Patient described her current relationships with family of origin as disowned her because of her daughter being bi-racial, no relationship with her sister after the Mario Octaviano incident..     The patient describes her cultural background as .  Cultural influences and impact on patient's life structure, values, norms, and healthcare: Raised frank and now agnostic.  My 33 year old daughter is bi-racial (black and white)..  Contextual influences on patient's health include: Contextual Factors: Individual Factors help seeking .    These factors will be addressed in the Preliminary Treatment plan. Patient identified her preferred language to be English. Patient reported that she does not need the assistance of an  or other support involved in therapy.     Patient reported had no significant delays in developmental tasks.   Patient's highest education level was college graduate  .  Patient identified the following learning problems: none reported.  Modifications will be used to assist communication in therapy. Patient reports theat sheis  able to understand written materials.    Patient reported the following relationship history \"first time,  for one year, and second time  for 10 years\".  Patient's current relationship status is  since August 9th, 2023.   Patient identified her sexual orientation as heterosexual.  Patient reported having 1 child(kate). Patient identified adult child; pets; friends; other as part of her support system.  Patient identified the quality of these relationships as stable " "and meaningful,  .      Patient's current living/housing situation involves staying in own home/apartment.  The immediate members of family and household include Guevara Phillips, 33,Daughter and they report that housing is stable.    Patient is currently on medical leave since September 28th, 2023 and currently, is looking for short time disability. I am a  at this job for almost two years. employed full-time (customer service; however, she received a performance warning because she can \"barely function at work\"). Patient reports their finances are obtained through employment. Patient does identify finances as a current stressor.      Patient reported that they have not been involved with the legal system. Patient does not report being under probation/ parole/ jurisdiction or is under any current court jurisdiction. .    Patient's Strengths and Limitations:  Patient identified the following strengths or resources that will help them succeed in treatment: commitment to health and well being, community involvement, exercise routine, friends / good social support, family support, insight, and motivation. Things that may interfere with the patient's success in treatment include: financial hardship and lack of social support.     Assessments:  The following assessments were completed by patient for this visit:  PHQ9:       2/1/2012    11:00 AM 10/30/2012     8:00 AM 10/9/2023     1:26 PM   PHQ-9 SCORE   PHQ-9 Total Score 17 9    PHQ-9 Total Score MyChart   22 (Severe depression)   PHQ-9 Total Score   22     GAD7:       10/30/2012    10:50 AM 10/8/2023     5:25 PM   LUCIAN-7 SCORE   Total Score 9    Total Score  19 (severe anxiety)   Total Score  19     CAGE-AID:       10/8/2023     5:28 PM   CAGE-AID Total Score   Total Score 0   Total Score MyChart 0 (A total score of 2 or greater is considered clinically significant)     PROMIS 10-Global Health (all questions and answers displayed):       10/8/2023    "  5:27 PM   PROMIS 10   In general, would you say your health is: Good   In general, would you say your quality of life is: Poor   In general, how would you rate your physical health? Fair   In general, how would you rate your mental health, including your mood and your ability to think? Poor   In general, how would you rate your satisfaction with your social activities and relationships? Poor   In general, please rate how well you carry out your usual social activities and roles Poor   To what extent are you able to carry out your everyday physical activities such as walking, climbing stairs, carrying groceries, or moving a chair? Moderately   In the past 7 days, how often have you been bothered by emotional problems such as feeling anxious, depressed, or irritable? Always   In the past 7 days, how would you rate your fatigue on average? Very severe   In the past 7 days, how would you rate your pain on average, where 0 means no pain, and 10 means worst imaginable pain? 2   In general, would you say your health is: 3   In general, would you say your quality of life is: 1   In general, how would you rate your physical health? 2   In general, how would you rate your mental health, including your mood and your ability to think? 1   In general, how would you rate your satisfaction with your social activities and relationships? 1   In general, please rate how well you carry out your usual social activities and roles. (This includes activities at home, at work and in your community, and responsibilities as a parent, child, spouse, employee, friend, etc.) 1   To what extent are you able to carry out your everyday physical activities such as walking, climbing stairs, carrying groceries, or moving a chair? 3   In the past 7 days, how often have you been bothered by emotional problems such as feeling anxious, depressed, or irritable? 5   In the past 7 days, how would you rate your fatigue on average? 5   In the past 7 days,  how would you rate your pain on average, where 0 means no pain, and 10 means worst imaginable pain? 2   Global Mental Health Score 4   Global Physical Health Score 10   PROMIS TOTAL - SUBSCORES 14     [place] Suicide Severity Rating Scale (Short Version)      10/3/2023    10:18 AM 10/3/2023     6:39 PM 10/10/2023     8:00 AM   [place] Suicide Severity Rating (Short Version)   Over the past 2 weeks have you felt down, depressed, or hopeless? yes     Over the past 2 weeks have you had thoughts of killing yourself? no     Have you ever attempted to kill yourself? no     Q1 Wished to be Dead (Past Month)  no no   Q2 Suicidal Thoughts (Past Month)  no no   Q3 Suicidal Thought Method  no no   Q4 Suicidal Intent without Specific Plan  no no   Q5 Suicide Intent with Specific Plan  no no   Q6 Suicide Behavior (Lifetime)   no   Level of Risk per Screen  low risk low risk   Interventions Monitored via video         Personal and Family Medical History:  Patient does not report a family history of mental health concerns.  Patient reports family history includes Cancer in her father and maternal grandfather; Cancer - colorectal in her paternal aunt and paternal uncle..     Patient does report Mental Health Diagnosis and/or Treatment. None reported.  Patient has not received mental health services in the past.  Psychiatric Hospitalizations: none.  Patient denies a history of civil commitment.  Currently, patient had a therapy appointment yesterday with Mallory at [facility], does not like her because she is young and did not schedule a follow up appointment with her, request that writer helps her finding a new one, and has a psychiatry apt at [facility] [employer] on 10/1023 at 1:00 PM.       Patient has had a physical exam to rule out medical causes for current symptoms.  Date of last physical exam was within the past year. Client was encouraged to follow up with PCP if symptoms were to develop. The  patient does not have a Primary Care Provider and was encouraged to establish care with a PCP..  Patient reports no current medical and/or dental concerns.  Patient denies any issues with pain..   There are not significant appetite / nutritional concerns / weight changes.   Patient does not report a history of head injury / trauma / cognitive impairment.      Patient reports current meds as:   Outpatient Medications Marked as Taking for the 10/10/23 encounter (Hospital En[facility]   Medication Sig    citalopram (CELEXA) 20 MG tablet Take 1 tablet (20 mg) by mouth daily (Patient taking differently: Take 40 mg by mouth daily)       Medication Adherence:  Patient reports taking.  taking prescribed medications as prescribed.    Patient Allergies:    Allergies   Allergen Reactions    Morphine And Related     Penicillins     Sulfa Antibiotics        Medical History:  No past medical history on file.      Current Mental Status Exam:   Appearance:  Appropriate    Eye Contact:  Good   Psychomotor:  Normal       Gait / station:  no problem  Attitude / Demeanor: Cooperative  Interested  Speech      Rate / Production: Normal/ Responsive      Volume:  Normal  volume      Language:  intact  Mood:   Anxious  Depressed   Affect:   Lethargic    Thought Content: Rumination   Thought Process: Circumstantial      Associations: No loosening of associations  Insight:   Fair   Judgment:  Intact   Orientation:  All  Attention/concentration: Good    Substance Use:  Patient did report a family history of substance use concerns; see medical history section for details.  Patient has not received chemical dependency treatment in the past.  Patient has not ever been to detox.      Patient is not currently receiving any chemical dependency treatment.           Substance History of use Age of first use Date of last use     Pattern and duration of use (include amounts and frequency)   Alcohol currently use    "16 10/06/23 Alcohol: in the past: 3 glasses of wine when drinking.  Current: Social (\"mindful drinking\" 3 days a week)  Right now, does not keep alcohol in the house 2-4 drinks    Cannabis   used in the past 16 08/01/23 REPORTS SUBSTANCE USE: N/A     Amphetamines   never used     REPORTS SUBSTANCE USE: N/A   Cocaine/crack    used in the past 18 01/01/85  REPORTS SUBSTANCE USE: N/A   Hallucinogens never used         REPORTS SUBSTANCE USE: N/A   Inhalants never used         REPORTS SUBSTANCE USE: N/A   Heroin never used         REPORTS SUBSTANCE USE: N/A   Other Opiates used in the past 24 06/02/90 REPORTS SUBSTANCE USE: N/A   Benzodiazepine   never used     REPORTS SUBSTANCE USE: N/A   Barbiturates never used     REPORTS SUBSTANCE USE: N/A   Over the counter meds never used     REPORTS SUBSTANCE USE: N/A   Caffeine currently use 24  10/10/23 Drinking 2 cups of coffee daily   Nicotine  never used     REPORTS SUBSTANCE USE: N/A   Other substances not listed above:  Identify:  never used     REPORTS SUBSTANCE USE: N/A     Patient reported the following problems as a result of her substance use: no problems, not applicable.    Substance Use: No symptoms    Based on the negative CAGE score and clinical interview there  are indications of drug or alcohol abuse. Recommendation for substance abuse disorder evaluation with a substance use professional was given. Therapist did not recommend client to reduce use or abstain from alcohol or substance use. Therapist did not recommend structured treatment and or community support (AA, 12 step group, etc.). NA .    Significant Losses / Trauma / Abuse / Neglect Issues:   Patient did not serve in the .  There are indications or report of significant loss, trauma, abuse or neglect issues related to: are no indications and client denies any losses, trauma, abuse, or neglect concerns.  Concerns for possible neglect are not present.     Safety Assessment:   Patient denies current " homicidal ideation and behaviors.  Patient denies current self-injurious ideation and behaviors.    Patient denied risk behaviors associated with substance use.  Patient denies any high risk behaviors associated with mental health symptoms.  Patient reports the following current concerns for her personal safety: None.  Patient reports there are not firearms in the house.       There are no firearms in the home..    History of Safety Concerns:  Patient denied a history of homicidal ideation.     Patient denied a history of personal safety concerns.    Patient denied a history of assaultive behaviors.    Patient denied a history of sexual assault behaviors.     Patient denied a history of risk behaviors associated with substance use.  Patient denies any history of high risk behaviors associated with mental health symptoms.  Patient reports the following protective factors: forward or future oriented thinking; dedication to family or friends; safe and stable environment; purpose; adherence with prescribed medication; daily obligations; structured day; effective problem solving skills; commitment to well being; sense of meaning; financial stability; strong sense of self worth or esteem; sense of personal control or determination    Risk Plan:  See Recommendations for Safety and Risk Management Plan    Review of Symptoms per patient report:   Depression: Change in sleep, Lack of interest, Excessive or inappropriate guilt, Change in energy level, Change in appetite, Psychomotor slowing or agitation, Feelings of helplessness, Low self-worth, Ruminations, Irritability, Feeling sad, down, or depressed, Withdrawn, Poor hygeine, Frequent crying, and Anger outbursts, sense of doom, difficulty concentrating, crying or feels like crying, impaired decision making.    Lucy:  No Symptoms  Psychosis: No Symptoms  Anxiety: Excessive worry, Nervousness, Physical complaints, such as headaches, stomachaches, muscle tension, Sleep  disturbance, Psychomotor agitation, Ruminations, Poor concentration, Irritability, and Anger outbursts. racing thoughts  Panic:  Palpitations, Tremors, Shortness of breath, Tingling, Numbness, Sense of impending doom, and Hot or cold flashes  Post Traumatic Stress Disorder:  No Symptoms   Eating Disorder: No Symptoms  ADD / ADHD:  No symptoms  Conduct Disorder: No symptoms  Autism Spectrum Disorder: No symptoms  Obsessive Compulsive Disorder: No Symptoms    Patient reports the following compulsive behaviors and treatment history:  none reported .      Diagnostic Criteria:   Generalized Anxiety Disorder  A. Excessive anxiety and worry about a number of events or activities (such as work or school performance).   B. The person finds it difficult to control the worry.  C. Select 3 or more symptoms (required for diagnosis). Only one item is required in children.   - Restlessness or feeling keyed up or on edge.    - Being easily fatigued.    - Difficulty concentrating or mind going blank.    - Irritability.    - Muscle tension.    - Sleep disturbance (difficulty falling or staying asleep, or restless unsatisfying sleep).   D. The focus of the anxiety and worry is not confined to features of an Axis I disorder.  E. The anxiety, worry, or physical symptoms cause clinically significant distress or impairment in social, occupational, or other important areas of functioning.   F. The disturbance is not due to the direct physiological effects of a substance (e.g., a drug of abuse, a medication) or a general medical condition (e.g., hyperthyroidism) and does not occur exclusively during a Mood Disorder, a Psychotic Disorder, or a Pervasive Developmental Disorder.  Panic Disorder, A.Recurrent unexpected panic attacks. A panic attack is an abrupt surge of intense fear or intense discomfort that reaches a peak within minutes, and during which time four (or more) of the following symptoms occur: Chest pain , Chill / hot flashes,  "Feeling dizzy, unsteady, light-headed, or faint, Fear of dying, Fear of losing control or \"going crazy\", Feeling of choking, Nausea or abdominal distress, Increased heart rate/ Palpitations, Paresthesias (numbness or tingling sensations), Shortness of breath, Sweating, Trembling / shaking, and Derealization or depersonalization, B.At least one of the attacks has been followed by 1 month (or more) of Persistent concern or worry about additional panic attacks or their consequences, C.The disturbance is not attributable to the physiological effects of a substance (e.g., a drug of abuse, a medication) or another medical condition (e.g., hyperthyroidism, cardiopulmonary disorders)., and D.The disturbance is not better explained by another mental disorder Major Depressive Disorder  CRITERIA (A-C) REPRESENT A MAJOR DEPRESSIVE EPISODE - SELECT THESE CRITERIA  A) Single episode - symptoms have been present during the same 2-week period and represent a change from previous functioning 5 or more symptoms (required for diagnosis)   - Depressed mood. Note: In children and adolescents, can be irritable mood.     - Diminished interest or pleasure in all, or almost all, activities.    - Significant weight gaindecrease in appetite.    - Increased sleep.    - Psychomotor activity agitation.    - Fatigue or loss of energy.    - Feelings of worthlessness or inappropriate and excessive guilt.    - Diminished ability to think or concentrate, or indecisiveness.   B) The symptoms cause clinically significant distress or impairment in social, occupational, or other important areas of functioning  C) The episode is not attributable to the physiological effects of a substance or to another medical condition  D) The occurence of major depressive episode is not better explained by other thought / psychotic disorders  E) There has never been a manic episode or hypomanic episode    Functional Status:  Patient reports the following functional " impairments:   health maintenance; home life; management of the household and or completion of tasks; operation of a motor vehicle; organization; relationship(s); self care; social interactions .     Programmatic care:  Current LOCUS was assigned and patient needs the following level of care based on score 19  .    Clinical Summary:  1. Reason for assessment: Due to worsening mental health symptoms and decline in functioning.  2. Psychosocial, Cultural and Contextual Factors: helplessness/hopelessness, loss of a relationship due to divorce/separation, loss of a loved one.    3. Principal DSM5 Diagnoses  (Sustained by DSM5 Criteria Listed Above):   296.23 (F32.2) Major Depressive Disorder, Single Episode, Severe _ and With melancholic features  300.02 (F41.1) Generalized Anxiety Disorder.  4. Other Diagnoses that is relevant to services:   300.01 (F41.0) Panic Disorder.  5. Provisional Diagnosis:none .  6. Prognosis: Expect Improvement and Relieve Acute Symptoms.  7. Likely consequences of symptoms if not treated: patient's ongoing symptoms are more than likely to get worse and experience a decreased daily in functioning and may require a higher level of care.  8. Client strengths include:  caring, educated, employed, has a previous history of therapy, insightful, intelligent, motivated, open to learning, open to suggestions / feedback, responsible parent, support of family, friends and providers, and work history .     Recommendations:     1. Plan for Safety and Risk Management:   Safety and Risk: A safety and risk management plan has been developed including: Patient consented to co-developed safety plan.  Safety and risk management plan was completed - see below.  Patient agreed to use safety plan should any safety concerns arise.  A copy was given to the patient..          Report to child / adult protection services was NA.     2. Patient's identified no conner / Yazdanism / spiritual influences relevant to  "programming and therapy referrals at this time.    3. Initial Treatment will focus on:   Depressed Mood -    Anxiety -    Adjustment Difficulties related to: loss of signigicant relationship  Functional Impairment at: work  Risk Management / Safety Concerns related to: none .     4. Resources/Service Plan:    services are not indicated.   Modifications to assist communication are not indicated.   Additional disability accommodations are not indicated.      5. Collaboration:   Collaboration / coordination of treatment will be initiated with the following  support professionals: Targeted Case Management (TCM).      6.  Referrals:   The following referral(s) will be initiated: [facility]. Next Scheduled Appointment: Patient will be placed on the [facility] 55+ referral for admission. Date: Thursday, 10/12/2023  Time: 4:00 pm - 4:55 pm  Provider: Liza Guevara MA  [facility]  Location: [facility], [address] [place] [address], [address], [place] [address]  Phone: [phone]  Appointment Type  Therapy-initial in person.      A Release of Information has been obtained for the following: Targeted Case Management (TCM).     Clinical Substantiation/medical necessity for the above recommendations:  Patient is a 51-year-old heterosexual   female who is seeking services currently due to \"Anxiety and depression\". Patient endorses significant recent stressors including her father's death, divorce from her , relocating to [place] from [place], living with her cousin, and moving to an apartment today. Patient reports memory concerns such as forgetting where she is while driving and making \"sloppy\" spreadsheets at work. Patient is also trying to obtain FMLA and short-term disability to focus seeking intensive mental health services. Patient endorses debilitating anxiety lasting for days, to the point she is not leaving her house " "inability to get my thoughts together, feeling as though she is \"spiraling\" and \"can't turn her head off\". Patient reports depression off and on, currently affecting her personal life, not able to work to the point her tavon was concerned about her performance, no motivation, making mistakes on the job, goes to sleep while working, crying all the time, sensitive and anger outburst with people for no reason. Patient endorses history of alcohol abuse, practices \"mindful drinking\" 3 days per week now and does not keep alcohol in the house. Patient has attended therapy twice in the past 10 years with , me in 3 different occasions. Currently, patient had a therapy appointment yesterday with Mallory at [facility], does not like her because she is young and did not schedule a follow up appointment with her, request that writer helps her finding a new one, and has a psychiatry apt at [facility] on 10/1023 at 1:00 PM.       Patient endorses with Change in sleep, Lack of interest, Excessive or inappropriate guilt, change in energy level, change in appetite, Psychomotor slowing or agitation, Feelings of helplessness, Low self-worth, Ruminations, Irritability, feeling sad, down, or depressed, Withdrawn, Poor hygiene, Frequent crying, and Anger outbursts, sense of doom, difficulty concentrating, crying or feels like crying, impaired decision making. Excessive worry, Nervousness, Physical complaints, such as headaches, stomachaches, muscle tension, Sleep disturbance, Psychomotor agitation, Ruminations, Poor concentration, Irritability, and Anger outbursts. racing thoughts. Palpitations, Tremors, Shortness of breath, Tingling, Numbness, Sense of impending doom, and Hot or cold flashes.     Patient endorses with no prior suicide attempts or current suicidal ideation and thoughts. She was engaged in safety planning and identified numerous protective factors. Patient agrees with referral to " IOP 55+ at [facility] and referral has been made for programming. Patient also agrees to individual therapy with Liza Guevara at [facility] on 10/12/ 23 at 4 pm. Patient's acute suicide risk was determined to be low due to the following factors: denial of current suicidal ideations and thoughts or past suicidal behaviors. Patient is not currently under the influence of alcohol or illicit substances, denies experiencing command hallucinations, and has no direct access to firearms. Patient's acute risk could be higher if noncompliant with treatment plan, medications, follow-up appointments or using illicit substances or alcohol. Protective factors include forward or future oriented thinking; dedication to family or friends; safe and stable environment; purpose; adherence with prescribed medication; daily obligations; structured day; effective problem-solving skills; commitment to well-being; sense of meaning; financial stability; strong sense of self-worth or esteem; sense of personal control or determination. Patient reports no suicidal ideations/thoughts or suicidal behaviors, and is not currently using illicit substances, however, a safety plan was developed. Patient instructed to present to her nearest emergency room if symptoms worsen.    7. JUAN JOSÉ:    JUAN JOSÉ:  Discussed the general effects of drugs and alcohol on health and well-being. Provider gave patient printed information about the effects of chemical use on her health and well being. Recommendations:  none .     8. Records:   These were reviewed at time of assessment.   Information in this assessment was obtained from the medical record and  provided by patient who is a good historian.    Patient will have open access to their mental health medical record.    9.   Interactive Complexity: No    10. Safety Plan:  When the patient identifies the following:  Modifiable risk factors and strong protective factors :see above    The following  "is recommended:   Complete/Review/Update Safety Plan    Safety Plan:  Adult Long Safety Plan:     [facility]                                       Angi Canales     SAFETY PLAN:  Step 1: Warning signs / cues (Thoughts, images, mood, situation, behavior) that a crisis may be developing:  Thoughts: \"I don't matter\" and \"I'm a burden\"  Images:  None  Thinking Processes: ruminations (can't stop thinking about my problems):  , racing thoughts, and highly critical and negative thoughts:    Mood: worsening depression, helplessness, intense anger, intense worry, and agitation  Behaviors: isolating/withdrawing , can't stop crying, aggression, not taking care of myself, not taking care of my responsibilities, and sleeping too much  Situations: changes in symptoms: depression/anxiety, financial stress, and  divorce.   Step 2: Coping strategies - Things I can do to take my mind off of my problems without contacting another person (relaxation technique, physical activity):  Distress Tolerance Strategies:   being and playing with my grand kids and activities  Physical Activities: go for a walk and sing up for the gym  Focus on helpful thoughts:  \"I will get through this\" and \"It always passes\"  Step 3: People and social settings that provide distraction:   Name: my family, my daughter, tow best friends here and my cousin Phone: NA   Name: NA Phone: NA   Name: NA Phone: NA  coffee shop and community center   Step 4: Remind myself of people and things that are important to me and worth living for:  my grand children      Step 5: When I am in crisis, I can ask these people to help me use my safety plan:   Name: my best friend Luzma Phone: NA   Name: NA Phone: NA   Name: NA Phone: NA  Step 6: Making the environment safe:   remove alcohol and be around others  Step 7: Professionals or agencies I can contact during a crisis:  Suicide Prevention Lifeline: Call or Text 988   Local " Crisis Services: [facility]  [phone] (adults)  [phone] (children)    Call 911 or go to my nearest emergency department.   I helped develop this safety plan and agree to use it when needed.  I have been given a copy of this plan.      Client signature _________________________________________________________________  Today s date:  10/10/2023  Completed by Provider Name/ Credentials:  SHAQ Gilbert/KATHY  October 10, 2023  Adapted from Safety Plan Template 2008 Jenniffer Anaya and Selvin Sawant is reprinted with the express permission of the authors.  No portion of the Safety Plan Template may be reproduced without the express, written permission.  You can contact the authors at [email or web address] or [email or web address].        Provider Name/ Credentials:  SHAQ Gilbert LADC  Dual   Phone: [phone]  Fax: [phone]     October 10, 2023

## 2023-10-10 NOTE — PROGRESS NOTES
LOCUS Worksheet     Name: Angi Canales MRN: [id number]    : 1965      Gender:  female    PMI:  NA   Provider Name: [facility]   Provider NPI:  [id number]    Actual level of Care Provided:  therapy    Service(s) receiving or referred to:  IOP 55+    Reason for Variance: Due to worsening mental health symptoms and decline in functioning.      Rating completed by: Senthil Poe LPCC/EVAC      I. Risk of Harm:   2      Low Risk of Harm    II. Functional Status:   4      Serious Impairment    III. Co-Morbidity:   2      Minor Co-Morbidity    IV - A. Recovery Environment - Level of Stress:   3      Moderately Stress Environment    IV - B. Recovery Environment - Level of Support:   2      Supportive Environment    V. Treatment and Recovery History:   3      Moderate to Equivocal Response to Treatment and Recovery Management    VI. Engagement and Recovery Project:   3      Limited Engagement and Recovery       19 Composite Score    Level of Care Recommendation:   17 to 19       High Intensity Community Based Services     Patient resting well on room air

## 2023-10-12 NOTE — ADDENDUM NOTE
Encounter addended by: Senthil Poe, [facility], [facility] on: 10/12/2023 12:24 PM   Actions taken: Clinical Note Signed

## 2023-10-16 ENCOUNTER — TELEPHONE (OUTPATIENT)
Dept: BEHAVIORAL HEALTH | Facility: CLINIC | Age: 58
End: 2023-10-16
Payer: COMMERCIAL

## 2023-10-16 ENCOUNTER — HOSPITAL ENCOUNTER (OUTPATIENT)
Dept: BEHAVIORAL HEALTH | Facility: CLINIC | Age: 58
Discharge: HOME | End: 2023-10-16
Attending: PSYCHIATRY & NEUROLOGY
Payer: COMMERCIAL

## 2023-10-16 VITALS
HEIGHT: 68 IN | HEART RATE: 73 BPM | SYSTOLIC BLOOD PRESSURE: 123 MMHG | TEMPERATURE: 98.3 F | BODY MASS INDEX: 25.16 KG/M2 | OXYGEN SATURATION: 96 % | DIASTOLIC BLOOD PRESSURE: 73 MMHG | WEIGHT: 166 LBS | RESPIRATION RATE: 16 BRPM

## 2023-10-16 DIAGNOSIS — F32.2 MDD (MAJOR DEPRESSIVE DISORDER), SINGLE EPISODE, SEVERE (H): Primary | ICD-10-CM

## 2023-10-16 DIAGNOSIS — F41.9 ANXIETY: ICD-10-CM

## 2023-10-16 PROCEDURE — 99205 OFFICE O/P NEW HI 60 MIN: CPT

## 2023-10-16 PROCEDURE — 90853 GROUP PSYCHOTHERAPY: CPT | Performed by: SOCIAL WORKER

## 2023-10-16 RX ORDER — BUPROPION HYDROCHLORIDE 150 MG/1
150 TABLET ORAL EVERY MORNING
COMMUNITY

## 2023-10-16 ASSESSMENT — ANXIETY QUESTIONNAIRES
GAD7 TOTAL SCORE: 21
GAD7 TOTAL SCORE: 21
1. FEELING NERVOUS, ANXIOUS, OR ON EDGE: NEARLY EVERY DAY
2. NOT BEING ABLE TO STOP OR CONTROL WORRYING: NEARLY EVERY DAY
6. BECOMING EASILY ANNOYED OR IRRITABLE: NEARLY EVERY DAY
5. BEING SO RESTLESS THAT IT IS HARD TO SIT STILL: NEARLY EVERY DAY
3. WORRYING TOO MUCH ABOUT DIFFERENT THINGS: NEARLY EVERY DAY
IF YOU CHECKED OFF ANY PROBLEMS ON THIS QUESTIONNAIRE, HOW DIFFICULT HAVE THESE PROBLEMS MADE IT FOR YOU TO DO YOUR WORK, TAKE CARE OF THINGS AT HOME, OR GET ALONG WITH OTHER PEOPLE: EXTREMELY DIFFICULT
7. FEELING AFRAID AS IF SOMETHING AWFUL MIGHT HAPPEN: NEARLY EVERY DAY

## 2023-10-16 ASSESSMENT — PATIENT HEALTH QUESTIONNAIRE - PHQ9
SUM OF ALL RESPONSES TO PHQ QUESTIONS 1-9: 23
5. POOR APPETITE OR OVEREATING: NEARLY EVERY DAY

## 2023-10-16 NOTE — GROUP NOTE
Process Group Note    PATIENT'S NAME: Angi Canales  MRN:   [id number]  :   1965  ACCT. NUMBER: [id number]  DATE OF SERVICE: 10/16/23  START TIME:  9:00 AM  END TIME:  9:50 AM  FACILITATOR: Liza Mai [facility]  TOPIC:  Process Group    Diagnoses:  296.23 (F32.2) Major Depressive Disorder, Single Episode, Severe _ and With melancholic features  300.02 (F41.1) Generalized Anxiety Disorder.  4. Other Diagnoses that is relevant to services:   300.01 (F41.0) Panic Disorder    [facility] 55+ Program  TRACK: IOP 5 (A2)    NUMBER OF PARTICIPANTS: 4        Data:    Session content: At the start of this group, patients were invited to check in by identifying themselves, describing their current emotional status, and identifying issues to address in this group.   Area(s) of treatment focus addressed in this session included Symptom Management, Personal Safety, Community Resources/Discharge Planning, Abstinence/Relapse Prevention, Develop / Improve Independent Living Skills, and Develop Socialization / Interpersonal Relationship Skills.    Angi began the IOP 5 (A2) track today. She was able to self disclose the mood symptoms, life situations that contributed to her peers in group. She reports depressed and anxious mood impacting her functioning and is not able to work currently. She made a recent move from [place] back to the [facility] in 2023. Denies S/I or safety issues. She is using coping skills for symptom management including progressive relaxation. She is looking forward to [facility] and found her first day helpful.      Therapeutic Interventions/Treatment Strategies:  Psychotherapist offered support, feedback and validation and reinforced use of skills. Treatment modalities used include Cognitive Behavioral Therapy.    Assessment:    Patient response:   Patient responded to session by accepting feedback, listening, focusing on goals, being attentive, accepting support, and verbalizing  understanding    Possible barriers to participation / learning include: and no barriers identified    Health Issues:   None reported. Medication compliance.       Substance Use Review:   Substance Use: No active concerns identified.    Mental Status/Behavioral Observations  Appearance:   Appropriate   Eye Contact:   Good   Psychomotor Behavior: Normal   Attitude:   Cooperative  Interested  Orientation:   All  Speech   Rate / Production: Normal    Volume:  Normal   Mood:    Anxious  Depressed   Affect:    Appropriate   Thought Content:   Clear and Safety denies any current safety concerns including suicidal ideation, self-harm, and homicidal ideation  Thought Form:  Coherent  Goal Directed  Logical     Insight:    Good     Plan:   Safety Plan: No current safety concerns identified.  Recommended that patient call 911 or go to the local ED should there be a change in any of these risk factors.   Barriers to treatment: None identified  Patient Contracts (see media tab):  None  Substance Use: Not addressed in session   Continue or Discharge: Patient will continue in 55+ Program (55+) as planned. Patient is likely to benefit from learning and using skills as they work toward the goals identified in their treatment plan.      Liza Mai, [facility]  October 16, 2023

## 2023-10-16 NOTE — GROUP NOTE
Psychotherapy Group Note    PATIENT'S NAME: Angi Canales  MRN:   [id number]  :   1965  ACCT. NUMBER: [id number]  DATE OF SERVICE: 10/16/23  START TIME: 11:00 AM  END TIME: 11:50 AM  FACILITATOR: Liza Mai LICSW  TOPIC: MH EBP Group: Zohra Skills  [facility] 55+ Program  TRACK: [facility] 5 (A2)    NUMBER OF PARTICIPANTS: 4    Summary of Group / Topics Discussed:  Coping Skills: Calming Strategies: Patients discussed and practiced strategies to increase sense of calm in the body.  Reviewed the benefits of calming strategies as well as past / current practices of each member.  Patients identified situations in which using these strategies would reduce stress. They developed the ability to distinguish when these strategies can be useful in their lives for management and stress and psychological well-being.    Patient Session Goals / Objectives:  Understand the purpose of using calming strategies to reduce stress  Review patients current calming practices and discuss a more formal way of practicing and accessing skills.  Increase ability to decide when to use various calming strategies (e.g. Progressive muscle relaxation, deep breathing, guided imagery, + thoughts, TIP).  Choose 1-2 calming strategies to apply during times of distress.  Practiced progressive muscle relaxation in group      Patient Participation / Response:  Fully participated with the group by sharing personal reflections / insights and openly received / provided feedback with other participants.    Demonstrated understanding of topics discussed through group discussion and participation, Practiced 2-3 new coping skills in session, and Identified / Expressed personal readiness to practice new coping skills    Treatment Plan:  Patient has a current master individualized treatment plan.  See Epic treatment plan for more information.    RAYMUNDO Headley

## 2023-10-16 NOTE — H&P
"[facility]   Adult Mental Health Outpatient Programs  Provider Intake Note    Program: Intensive Outpatient Program, track 5    Patient: Angi Canales  MRN: [id number]  : 1965  Acct. No.: [id number]  Date of Service:  10/16/23    Chart review:  Diagnostic Assessment dated: 10/10/2023  Recent Emergency Department visit documentation: 10/03/2023  Recent inpatient discharge summary: N/A  Other: N/A    Outpatient Providers:  Current Outpatient Psychiatric Provider: Cynthia Luo at [facility]  Current Outpatient Individual Psychotherapist: Myla Guevara with [facility]   Primary Care Provider: Setting up services at [facility] in [place] Amna Ricci MD     Identifying Data:  Angi Canales, a 57 year old-year-old with reported history of  , presents for initial visit to provide oversight of programmatic care. Patient attended the session alone, uses she/her pronouns, and prefers to be called: \"Angi\"    Presenting Concern:  \"Depression and anxiety\"   Per diagnostic assessment: \"Anxiety and depression\"    History of Present Illness:  Chart reviewed, history as documented reviewed with Angi. Patient endorses:  History depression and anxiety  Been taking Escitalopram for Anxiety for 10 years  I am having hard time functioning. Work is going down hill for a while. I think I need help  I am no just my self. I go a functioning warning at work. I realized that \"they now know\"  I ma having hard time learning and doing simple tasks. I cannot formulate an email. It is hard  I am always like in the fogue. I am making stupid mistake all the time  I sleep a lot. I take a lot of break because I am tired all the time.  I am easily upset. Extra sensitive. I do not think I am not handling it well. I get so upset and feel like I toss someone off  Current episode started around Octaviano death  I ended relationship with my sister. I brother . My  had an open heart " surgery  I filed for a divorce in May 2023, 8 months after buying a house   My  and my father both diet 1 week apart this year  Recent history of divorce. I moved back to [place] in June 24th  I have been overcompensating for so long.   I am not suicidal but I have constant concerns about my job  Medication  Escitalopram 40 mg   Been taking for about 10 years  Weaning me off of this medications  Body aches since starting titration down  Started Wellbutrin about a week ago    Review of Symptoms   Review of systems as recorded in diagnostic assessment reviewed with patient.  Today notes:  Sleep: History of idiopathic hypersomnolence. I sleep all th time. About 14 hours. Waiting for a sleep study order by sleep clinic.  Appetite: Fluctuate. Either overeat or does not eat at all  Depressed and sad  Low self esteem. Negative self talk. My confidence is gone  I am worried constantly about my future  I am crying  a lot  I do not remember things. I normally do not forget those things  Isolation  Guild- I am burden on my family  Low energy and tired  I worry about my  - almost cried    Activities of Daily Living and Related Systems:  Hygiene: No noted concerns  Socialization: Isolation  Chores and home upkeep: Struggling with cleaning or doing laundry  Shopping: Struggling  Concerns related to work: Received reprimand, filling for [employer]    Goals for Treatment (in addition to those goals listed in the BEH Treatment Plan Encounter):  I want to go back to work    Safety Assessment:  Suicidal ideation: denies current or recent suicidal ideation or behavior  Thoughts of non-suicidal self-injury: denied  Recent self-injurious behavior: denied  Homicidal ideation: denied  Other safety concerns: denied    Substance use:  Drink alcohol about 3-4 times a week. 3 drinks of beer or wine in one seating.    Medications:  Current Outpatient Medications   Medication Sig Dispense Refill    buPROPion (WELLBUTRIN XL) 150 MG 24  hr tablet Take 150 mg by mouth every morning      citalopram (CELEXA) 20 MG tablet Take 1 tablet (20 mg) by mouth daily (Patient taking differently: Take 40 mg by mouth daily) 90 tablet 0    estradiol (ESTRACE) 1 MG tablet Take 1 tablet (1 mg) by mouth daily (Patient not taking: Reported on 10/10/2023) 90 tablet 0       The above list was reviewed and updated in [facility] with patient today.     Patient is taking medications as prescribed and denies adverse effects    Medical Review of Systems:  Pertinent: None    Recent Screenings and Metrics:  PHQ-9 scores:       2/1/2012    11:00 AM 10/30/2012     8:00 AM 10/9/2023     1:26 PM 10/16/2023     9:38 AM   PHQ-9 SCORE   PHQ-9 Total Score 17 9     PHQ-9 Total Score MyChart   22 (Severe depression)    PHQ-9 Total Score   22 23       LUCIAN-7 scores:       10/30/2012    10:50 AM 10/8/2023     5:25 PM 10/16/2023     9:38 AM   LUCIAN-7 SCORE   Total Score 9     Total Score  19 (severe anxiety)    Total Score  19 21       CSSR-S: [place] Suicide Severity Rating Scale (Lifetime/Recent)      10/3/2023     6:39 PM 10/10/2023     8:00 AM   [place] Suicide Severity Rating (Lifetime/Recent)   Q1 Wish to be Dead (Lifetime) No    Q2 Non-Specific Active Suicidal Thoughts (Lifetime) No    Q1 Wished to be Dead (Past Month) no no   Q2 Suicidal Thoughts (Past Month) no no   Q3 Suicidal Thought Method no no   Q4 Suicidal Intent without Specific Plan no no   Q5 Suicide Intent with Specific Plan no no   Q6 Suicide Behavior (Lifetime)  no   Level of Risk per Screen low risk low risk   Actual Attempt (Lifetime) N    Actual Attempt (Past 3 Months) N    Has subject engaged in non-suicidal self-injurious behavior? (Lifetime) N    Has subject engaged in non-suicidal self-injurious behavior? (Past 3 Months) N    Interrupted Attempts (Lifetime) N    Interrupted Attempts (Past 3 Months) N    Aborted or Self-Interrupted Attempt (Lifetime) N    Aborted or Self-Interrupted Attempt (Past 3 Months) N   "  Preparatory Acts or Behavior (Lifetime) N    Preparatory Acts or Behavior (Past 3 Months) N    Calculated C-SSRS Risk Score (Lifetime/Recent) No Risk Indicated          Psychiatric History:   Outpatient providers listed above.    Past medication trials include:  Hydroxyzine      Otherwise as noted above or in diagnostic assessment.     Substance Use History:  As noted above or in diagnostic assessment.     Past Medical History:  As noted above or in diagnostic assessment.     Labs:  Most recent labs reviewed. Pertinent updates/findings: None.     Family History:   As noted above or in diagnostic assessment.     Social History:   As noted above or in diagnostic assessment.     Legal History:  As noted above or in diagnostic assessment.     Significant Losses / Trauma / Abuse / Neglect Issues:  As noted above or in diagnostic assessment.     Mental Status Examination:  Vital Signs: /73   Pulse 73   Temp 98.3  F (36.8  C) (Skin)   Resp 16   Ht 1.727 m (5' 8\")   Wt 75.3 kg (166 lb)   SpO2 96%   BMI 25.24 kg/m     Appearance: adequately groomed, appears stated age, and in no apparent distress.  Attitude: cooperative   Eye Contact: good   Muscle Strength and Tone: no gross abnormalities   Psychomotor Behavior: normal or unremarkable   Gait and Station: normal width, turn, arm swing  Speech: normal rate, production, volume, and rhythm of  Associations: No loosening of associations  Thought Process: coherent and goal directed  Thought Content: no evidence of suicidal ideation or homicidal ideation, no evidence of psychotic thought, no auditory hallucinations present, and no visual hallucinations present  Mood: \"anxious and depressed\"  Affect: mood congruent  Insight: good  Judgment: intact, adequate for safety  Impulse Control: intact  Oriented to: time, place, person, and situation  Attention Span and Concentration: normal  Language: Intact  Recent and Remote Memory: intact to interview. Not formally " assessed. No amnesia.  Fund of Knowledge/Assessment of Intelligence: Average  Capacity of Activities of Daily Living: Independent, able to participate in programmatic care services.    DSM5 Diagnosis/es:    ICD-10-CM    1. MDD (major depressive disorder), single episode, severe (H)  F32.2       2. Anxiety  F41.9           Other Diagnoses that is relevant to services:     300.01 (F41.0) Panic Disorder.     Assessment/Plan:  Angi is a 57 years old female (she her) presents today for initial provider visit as part of program intake, coordination, and supervision.  Per diagnostic assessment, patient comes with a severe episode of major depression with melancholic features and generalized anxiety disorder and possibly panic attack disorder.  Per patient report, history of anxiety and depression.  Although patient has a history of anxiety and depression, however recent increase in psychosocial stressors including the death of her father, divorce from her , relocating to [place] from [place], receiving a letter of reprimand from work, and a constant fear of losing employment could be contributing to current exacerbation in mental health symptoms. Today, patient endorsed poor appetite, oversleeping, depressed and sad, low self-esteem, negative self talk, constantly worrying about future, crying a lot, cannot remember things, isolation, guilt of being a burden to family, low energy, and forgetfulness.  Patient has established outpatient provider who is currently managing medication.  Patient reported a recent decrease in Escitalopram and the introduction of Wellbutrin. Patient has not noted much benefit with medication changes also denies any adverse reactions.  We will continue to monitor. I advised this patient to continue working with outpatient provider for medication management while she attends psychotherapy.  Current exacerbation in anxiety and depression in the context of multiple psychosocial stressors  is contributing to safety concerns and self-care deficits. Psychotherapy in addition to medication management is the most impactful treatment modality to help the patient achieve self-identified goals. Intensive outpatient program is the most appropriate level of care. Besides, psychotherapy and medication management, we explored ways to improve sleep hygiene, recommended a balanced diet, and encouraged to engage in physical activities as tolerated. Patient verbalized understanding and agreed to treatment plan. Follow up appointment in 4 weeks or sooner.     296.23 (F32.2) Major Depressive Disorder, Single Episode, Severe and With melancholic features   Engage in psychotherapy  Continue working with outpatient provider for medication management  Continue with current medication regimen  Titrating to stop Escitalopram  Started Wellbutrin 150 mg daily  Improve sleep hygiene  Maintain a balanced diet  Engage in physical activities as tolerated    300.02 (F41.1) Generalized Anxiety Disorder.   As noted above    Safety Assessment  Today Angi denies any current safety concerns including suicidal ideation, self-harm, and homicidal ideation   Angi is future-oriented and engaged in treatment planning   I do not feel that Angi meets criteria for a 72-hour involuntary hold and remains appropriate for an outpatient level of care.     Continue therapy as planned:  Enrolled in Intensive Outpatient Program, track 5  Patient continues to meet criteria for recommended level of care.  Patient is expected to make a timely and significant improvement in the presenting acute symptoms as a result of participation in this program.  Patient would be at reasonable risk of requiring a higher level of care in the absence of current services.  Continue with individual therapist as appropriate    Safety plan reviewed.   To the Emergency Department as needed or call after hours crisis line at [phone] or [phone]. [place] Crisis  Text Line: Text [place] to 270908  or  Suicide LifeLine Chat: suicidePro V&V.org/chat    Follow-up:   schedule an appointment with me or another program provider in approximately in 4 week(s) or sooner if needed.  Can speak with a staff member or call the appropriate program number (see below) to schedule  Follow up with outpatient provider(s) as planned or sooner if needed for acute medical concerns.    Questions or concerns:  Call program line with questions or concerns (see below)  [employer] may be used to communicate with your provider, but this is not intended to be used for emergencies.    [facility] Adult Mental Health Program lines:  [facility]: [phone]  Dual Disorder: [phone]  Adult Day Treatment:  [phone]  55+/Intensive Outpatient: [phone]    Community Resources:    National Suicide Prevention Lifeline: 988 from any phone, or 352-418-0241 (TTY: 461.137.3724). Call anytime for help.  (www.suicidepreventionlifeline.org)  National [place] on Mental Illness (www.buck.org): 599.592.6427 or 845-929-8522.   Mental Health Association (www.mentalhealth.org): 275.530.4100 or 255-861-4348.  [place] Crisis Text Line: Text [place] to 918812  Suicide LifeLine Chat: suicidepreAr[place] Department of Education.Strut/[place]    Treatment Objective(s) Addressed in This Session:  One purpose of today's call is for this writer to provide oversight of patient's care while receiving program services. Specific treatment goals addressed included personal safety, symptoms stabilization and management, wellness and mental health, and community resources/discharge planning.     Patient agrees with the current plan of care.    Signed:   YRIS FULTON CNP   October 16, 2023      Visit Details:  Type of service: In-person    Location (patient and provider): [facility] Adult Mental Health Outpatient Programmatic Care Offices    Level of Medical Decision Making:   - At least 2 stable chronic problems  - Engaged  in prescription drug management during visit (discussed any medication benefits, side effects, alternatives, etc.)         60 min spent on the date of the encounter in chart review, patient visit, review of tests, documentation, care coordination, and/or discussion with other providers about the issues documented above.      This document completed in part using [employer] dictation software and therefore may contain inadvertent word or phrase substitutions.

## 2023-10-16 NOTE — TELEPHONE ENCOUNTER
----- Message from Fernando Shi sent at 10/16/2023  8:14 AM CDT -----  Regarding: RE: Referral for 55 plus  Scheduling Request    Patient Name: Angi Canales  Location of programming: [facility]  Start Date: October / 16 / 2023  Group: [id number], IOP5 on Monday, Tuesday, Wednesday, and Friday at 9:00 AM to 12:00 PM  Attending Provider (MD): Lorena  Number of visits to be scheduled: as many as possible (new start)  Duration of Appointment in minutes: 180 mins  Visit Type: In-person or Treatment - 870    Additional notes: Pt starting today.  Please make appts    ----- Message -----  From: Michelle Griffin  Sent: 10/12/2023   4:31 PM CDT  To: Macey Dietz [facility]; #  Subject: Referral for 55 plus                             Adult Mental Health Programmatic Care Schedule Request    Patient Name: Angi Canales  Location of programming: [facility]  Start Date: 10/16/2023      Adult Program Group: PHR,[id number]]  Attending Provider (MD):  Dr Niall Carrillo.  Visit Type:  In-Person    Accommodations Needed: No  Alerts Identified/Substantiation: No  Consulted with Supervisor: No

## 2023-10-17 ENCOUNTER — HOSPITAL ENCOUNTER (OUTPATIENT)
Dept: BEHAVIORAL HEALTH | Facility: CLINIC | Age: 58
Discharge: HOME | End: 2023-10-17
Attending: PSYCHIATRY & NEUROLOGY
Payer: COMMERCIAL

## 2023-10-17 PROCEDURE — 90853 GROUP PSYCHOTHERAPY: CPT

## 2023-10-17 PROCEDURE — 90853 GROUP PSYCHOTHERAPY: CPT | Performed by: SOCIAL WORKER

## 2023-10-17 NOTE — GROUP NOTE
Psychotherapy Group Note    PATIENT'S NAME: Angi Canales  MRN:   [id number]  :   1965  ACCT. NUMBER: [id number]  DATE OF SERVICE: 10/17/23  START TIME: 11:00 AM  END TIME: 11:50 AM  FACILITATOR: Liza Mai LICSW  TOPIC: MH EBP Group: Emotions Management  [facility] 55+ Program  TRACK: IOP 5 (B1)    NUMBER OF PARTICIPANTS: 5    Summary of Group / Topics Discussed:  Emotions Management: Guilt and Shame: Patients explored and shared personal experiences associated with feelings of guilt and shame.  Group explored how these feelings develop, what they mean to each individual, and how to increase acceptance and usefulness of these feelings.  Group members assisted one another to contextualize these concepts and promote healing.     Patient Session Goals / Objectives:  Discuss and review definitions and personal views/experiences with guilt and shame  Understand the differences between guilt and shame  Explore how feelings of guilt and shame impact functioning  Understand and practice strategies to manage difficult emotions and move towards healing  Understand and normalize difficult emotions through group discussion  Understand the utility of guilt and shame  Target  unwanted  emotions for change  Identified positive affirmation to use for self compassion      Patient Participation / Response:  Fully participated with the group by sharing personal reflections / insights and openly received / provided feedback with other participants.    Demonstrated understanding of topics discussed through group discussion and participation and Demonstrated knowledge of when to consider applying a variety of emotions management skills in daily life    Treatment Plan:  Patient has a current master individualized treatment plan.  See Epic treatment plan for more information.    RAYMUNDO Headley

## 2023-10-17 NOTE — GROUP NOTE
Psychotherapy Group Note    PATIENT'S NAME: Angi Canales  MRN:   [id number]  :   1965  ACCT. NUMBER: [id number]  DATE OF SERVICE: 10/17/23  START TIME: 10:00 AM  END TIME: 10:50 AM  FACILITATOR: Kelsey Sue LGSW  TOPIC:  EBP Group: Emotions Management  [facility] Mental Health Day Treatment  TRACK: IOP 5    NUMBER OF PARTICIPANTS: 6    Summary of Group / Topics Discussed:  Emotions Management: Model of Emotions: Patients were introduced to the cyclical model of emotions.  Explored emotions are shaped by different events and one s interpretation of events.  Group discussed how emotions begin with an event, followed by one s interpretation, followed by associated feelings.  Discussion included a review of personal urges and actions that can/do follow an emotional experience in the patient s life, and the end results.    Patient Session Goals / Objectives:  Demonstrate understanding of types various emotions.  Identify and discuss specific emotions and when they occur; understand triggers.  Identify individual emotions and physical sensations that accompany them.  Discuss urges and actions, and how to influence the intensity of emotional reactions and disrupt the cycle.    Discuss barriers to emotional regulation.  Choose 1-2 strategies to assist with emotional response to potentially distressing situations.      Patient Participation / Response:  Fully participated with the group by sharing personal reflections / insights and openly received / provided feedback with other participants.    Demonstrated understanding of topics discussed through group discussion and participation, Expressed understanding of the relevance / importance of emotions management skills at distressing times in life, and Self-aware of experiences with difficult emotions, and strategies to employ to manage them    Treatment Plan:  Patient has a current master individualized treatment plan.  See Epic treatment plan  for more information.    Kelsey Sue LGSW

## 2023-10-17 NOTE — GROUP NOTE
Process Group Note    PATIENT'S NAME: Angi Canales  MRN:   [id number]  :   1965  ACCT. NUMBER: [id number]  DATE OF SERVICE: 10/17/23  START TIME:  9:00 AM  END TIME:  9:50 AM  FACILITATOR: Liza Mai LICSW  TOPIC:  Process Group    Diagnoses:  296.23 (F32.2) Major Depressive Disorder, Single Episode, Severe _ and With melancholic features  300.02 (F41.1) Generalized Anxiety Disorder.  4. Other Diagnoses that is relevant to services:   300.01 (F41.0) Panic Disorder    [facility] 55+ Program  TRACK: IOP 5 (B1)    NUMBER OF PARTICIPANTS: 6        Data:    Session content: At the start of this group, patients were invited to check in by identifying themselves, describing their current emotional status, and identifying issues to address in this group.   Area(s) of treatment focus addressed in this session included Symptom Management, Personal Safety, Community Resources/Discharge Planning, Abstinence/Relapse Prevention, Develop / Improve Independent Living Skills, and Develop Socialization / Interpersonal Relationship Skills.    Angi reports depressed and anxious mood. Denies S/I or safety issues. She reports less anxiety symptoms today due to feeling more  relief now that her FMLA was approved.  She was able to go grocery shopping. Her goal is to begin an exercise program. There is a workout room in her apartment complex. Angi reports that she uses symptom management skills.      Therapeutic Interventions/Treatment Strategies:  Psychotherapist offered support, feedback and validation and reinforced use of skills. Treatment modalities used include Cognitive Behavioral Therapy.    Assessment:    Patient response:   Patient responded to session by accepting feedback, listening, focusing on goals, being attentive, accepting support, and verbalizing understanding    Possible barriers to participation / learning include: and no barriers identified    Health Issues:   None reported. Reports  medication compliance.        Substance Use Review:   Substance Use: No active concerns identified.    Mental Status/Behavioral Observations  Appearance:   Appropriate   Eye Contact:   Good   Psychomotor Behavior: Normal   Attitude:   Cooperative  Interested  Orientation:   All  Speech   Rate / Production: Normal    Volume:  Normal   Mood:    Anxious  Depressed   Affect:    Appropriate   Thought Content:   Clear and Safety denies any current safety concerns including suicidal ideation, self-harm, and homicidal ideation  Thought Form:  Coherent  Goal Directed  Logical     Insight:    Good     Plan:   Safety Plan: No current safety concerns identified.  Recommended that patient call 911 or go to the local ED should there be a change in any of these risk factors.   Barriers to treatment: None identified  Patient Contracts (see media tab):  None  Substance Use: Not addressed in session   Continue or Discharge: Patient will continue in 55+ Program (55+) as planned. Patient is likely to benefit from learning and using skills as they work toward the goals identified in their treatment plan.      Liza Mai, [facility]  October 17, 2023

## 2023-10-18 ENCOUNTER — HOSPITAL ENCOUNTER (OUTPATIENT)
Dept: BEHAVIORAL HEALTH | Facility: CLINIC | Age: 58
Discharge: HOME | End: 2023-10-18
Attending: PSYCHIATRY & NEUROLOGY
Payer: COMMERCIAL

## 2023-10-18 PROCEDURE — 90853 GROUP PSYCHOTHERAPY: CPT | Performed by: SOCIAL WORKER

## 2023-10-18 PROCEDURE — 90853 GROUP PSYCHOTHERAPY: CPT

## 2023-10-18 NOTE — GROUP NOTE
Psychotherapy Group Note    PATIENT'S NAME: Angi Canales  MRN:   [id number]  :   1965  ACCT. NUMBER: [id number]  DATE OF SERVICE: 10/18/23  START TIME: 10:00 AM  END TIME: 10:50 AM  FACILITATOR: Kelsey Sue LGSW  TOPIC: MH EBP Group: Coping Skills  [facility] Adult Mental Health Day Treatment  TRACK: IOP 5    NUMBER OF PARTICIPANTS: 5    Summary of Group / Topics Discussed:  Coping Skills: Additional Coping Skills:  Patients discussed and created crisis plans.  Reviewed the benefits of applying the aforementioned coping strategies.  Patients explored how these strategies might be applied to daily stressors or distressing situations.    Patient Session Goals / Objectives:  Understand the purpose and benefits of having a crisis plan.  Address barriers to utilizing coping skills when in distress.      Patient Participation / Response:  Fully participated with the group by sharing personal reflections / insights and openly received / provided feedback with other participants.    Demonstrated understanding of topics discussed through group discussion and participation, Expressed understanding of the relevance / importance of coping skills at distressing times in life, and Demonstrated knowledge of when to consider using a variety of coping skills in daily life    Treatment Plan:  Patient has a current master individualized treatment plan.  See Epic treatment plan for more information.    CIARA Baez

## 2023-10-18 NOTE — GROUP NOTE
Psychotherapy Group Note    PATIENT'S NAME: Angi Canales  MRN:   [id number]  :   1965  [id number]  DATE OF SERVICE: 10/18/23  START TIME: 11:00 AM  END TIME: 11:50 AM  FACILITATOR: Liza Mai LICSW  TOPIC: MH EBP Group: Symptom Awareness  [facility] 55+ Program  TRACK: IOP 5  (A2)    NUMBER OF PARTICIPANTS: 5    Summary of Group / Topics Discussed:  Symptom Awareness: Symptom Observation and Tracking: An overview of symptom observation and tracking was presented to help patients identify specific symptoms and identify patterns. This topic will also assist patient in identifying progress towards goal of decreasing severity of symptoms and increasing overall functioning. Patients completed a symptom check list in session. Patient was assisted in identifying baseline functioning, patterns, and ways to assess current symptoms. Patient was also assisted in identifying a tool or strategy to continue to track or monitor symptoms over a period of time.       Patient Session Goals / Objectives:  Identified patient individual symptoms and experiences  Identified potential symptom patterns and factors that contribute to changes in symptom severity      Patient Participation / Response:  Fully participated with the group by sharing personal reflections / insights and openly received / provided feedback with other participants.    Demonstrated understanding of topics discussed through group discussion and participation, Identified / Expressed personal readiness to increase awareness of symptoms and apply skills as necessary, and Practiced skills in session    Treatment Plan:  Patient has a current master individualized treatment plan.  See Epic treatment plan for more information.    RAYMUNDO Headley

## 2023-10-18 NOTE — GROUP NOTE
Process Group Note    PATIENT'S NAME: Angi Canales  MRN:   [id number]  :   1965  ACCT. NUMBER: [id number]  DATE OF SERVICE: 10/18/23  START TIME:  9:00 AM  END TIME:  9:50 AM  FACILITATOR: Liza Mai LICSW  TOPIC:  Process Group    Diagnoses:  296.23 (F32.2) Major Depressive Disorder, Single Episode, Severe _ and With melancholic features  300.02 (F41.1) Generalized Anxiety Disorder.  4. Other Diagnoses that is relevant to services:   300.01 (F41.0) Panic Disorder    [facility] [place] 55+ Program  TRACK: A2    NUMBER OF PARTICIPANTS: 5        Data:    Session content: At the start of this group, patients were invited to check in by identifying themselves, describing their current emotional status, and identifying issues to address in this group.   Area(s) of treatment focus addressed in this session included Symptom Management, Personal Safety, Community Resources/Discharge Planning, Abstinence/Relapse Prevention, Develop / Improve Independent Living Skills, and Develop Socialization / Interpersonal Relationship Skills.    Angi reports anxious and depressed mood. Denies S/I or safety issues. She reports co-morbid physical health issues with sleepiness.  She reports medication compliance and consulted with her medication management provider yesterday. Angi processed multiple life transitions and losses which have impacted her functioning and mood symptoms including the passing of her father, recent divorce, recent move from [place] to [place] and also recently moved from her cousin's home to her own apartment on . She is also assisting her dog to get use to this transition. Her goal is to focus on physical health care and start an exercise routine. She  is using coping skills for symptom management including breathing, mindfulness. Her medical leave from work was approved so she feels relieved. Her daughter will have a baby mid-November.       Therapeutic Interventions/Treatment  Strategies:  Psychotherapist offered support, feedback and validation and reinforced use of skills. Treatment modalities used include Cognitive Behavioral Therapy.    Assessment:    Patient response:   Patient responded to session by accepting feedback, giving feedback, listening, focusing on goals, being attentive, accepting support, and verbalizing understanding    Possible barriers to participation / learning include: and no barriers identified    Health Issues:   Yes: Chronic disease management, Associated Psychological Distress. 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 Yesterday coped with sleepiness for most of the day.    Reports medication compliance. Consulted with her medication management provider who is decreasing her medication.        Substance Use Review:   Substance Use: No active concerns identified.    Mental Status/Behavioral Observations  Appearance:   Appropriate   Eye Contact:   Good   Psychomotor Behavior: Normal   Attitude:   Cooperative  Interested  Orientation:   All  Speech   Rate / Production: Normal    Volume:  Normal   Mood:    Anxious  Depressed   Affect:    Appropriate  Worrisome   Thought Content:   Clear and Safety denies any current safety concerns including suicidal ideation, self-harm, and homicidal ideation  Thought Form:  Coherent  Goal Directed  Logical     Insight:    Good     Plan:   Safety Plan: No current safety concerns identified.  Recommended that patient call 911 or go to the local ED should there be a change in any of these risk factors.   Barriers to treatment: None identified  Patient Contracts (see media tab):  None  Substance Use: Not addressed in session   Continue or Discharge: Patient will continue in 55+ Program (55+) as planned. Patient is likely to benefit from learning and using skills as they work toward the goals identified in their treatment plan.      Liza Mai, [facility] HospitalSW  October 18, 2023

## 2023-10-19 ENCOUNTER — OFFICE VISIT (OUTPATIENT)
Dept: PEDIATRICS | Facility: CLINIC | Age: 58
End: 2023-10-19
Payer: COMMERCIAL

## 2023-10-19 VITALS
SYSTOLIC BLOOD PRESSURE: 125 MMHG | OXYGEN SATURATION: 98 % | HEIGHT: 68 IN | WEIGHT: 167.5 LBS | HEART RATE: 64 BPM | TEMPERATURE: 97.9 F | DIASTOLIC BLOOD PRESSURE: 81 MMHG | RESPIRATION RATE: 16 BRPM | BODY MASS INDEX: 25.39 KG/M2

## 2023-10-19 DIAGNOSIS — F32.2 MDD (MAJOR DEPRESSIVE DISORDER), SINGLE EPISODE, SEVERE (H): Primary | ICD-10-CM

## 2023-10-19 DIAGNOSIS — E04.1 THYROID NODULE: ICD-10-CM

## 2023-10-19 DIAGNOSIS — R41.3 MEMORY DEFICIT: ICD-10-CM

## 2023-10-19 LAB — TSH SERPL DL<=0.005 MIU/L-ACNC: 2.05 UIU/ML (ref 0.3–4.2)

## 2023-10-19 PROCEDURE — 99204 OFFICE O/P NEW MOD 45 MIN: CPT | Mod: GC

## 2023-10-19 PROCEDURE — 36415 COLL VENOUS BLD VENIPUNCTURE: CPT

## 2023-10-19 PROCEDURE — 84443 ASSAY THYROID STIM HORMONE: CPT

## 2023-10-19 RX ORDER — VENLAFAXINE HYDROCHLORIDE 37.5 MG/1
75 CAPSULE, EXTENDED RELEASE ORAL DAILY
COMMUNITY
Start: 2023-10-17

## 2023-10-19 RX ORDER — LORAZEPAM 0.5 MG/1
TABLET ORAL
COMMUNITY
Start: 2023-10-17

## 2023-10-19 ASSESSMENT — PATIENT HEALTH QUESTIONNAIRE - PHQ9
10. IF YOU CHECKED OFF ANY PROBLEMS, HOW DIFFICULT HAVE THESE PROBLEMS MADE IT FOR YOU TO DO YOUR WORK, TAKE CARE OF THINGS AT HOME, OR GET ALONG WITH OTHER PEOPLE: EXTREMELY DIFFICULT
SUM OF ALL RESPONSES TO PHQ QUESTIONS 1-9: 22
SUM OF ALL RESPONSES TO PHQ QUESTIONS 1-9: 22

## 2023-10-19 ASSESSMENT — PAIN SCALES - GENERAL: PAINLEVEL: NO PAIN (0)

## 2023-10-19 NOTE — PATIENT INSTRUCTIONS
We will be in touch regarding your thyroid testing and next steps if needed.    Return for a preventive visit once we have your records (check to see when your insurance will cover your next preventive visit prior to scheduling).    Continue following with psychiatry and counseling. Let us know if you need additional assistance with your care or prescriptions.

## 2023-10-19 NOTE — PROGRESS NOTES
Assessment & Plan     Thyroid nodule, right  - US Thyroid  - TSH with free T4 reflex    Memory deficit  One year of symptoms, reports normal brain MRI in [place]. Moved to [place] prior to neurology visit in [place], would like to see neurology to complete workup here. Symptoms may be related to depression/anxiety, will monitor for improvement with treatment of mood symptoms.  - Adult Neurology  Referral    MDD (major depressive disorder), single episode, severe (H)  - Continue following with psychiatry and counseling  - Medications managed by psychiatry (will be on wellbutrin and venlafaxine moving forward)    Follow up: Return for a preventive visit once we have prior records (patient will check to see when her insurance will cover the next preventive visit prior to scheduling).    Amna Zepeda MD  [facility] GALO Whitley is a 57 year old, presenting for the following health issues:  [facility] F/U      10/19/2023     7:56 AM   Additional Questions   Roomed by Savannah   Accompanied by self         10/19/2023     7:56 AM   Patient Reported Additional Medications   Patient reports taking the following new medications in chart       HPI   Moved from [place] earlier this year, needs new PCP. Signing PAGE today, will update our records and then determine preventive care needs thereafter.     Weaning off of citalopram, starting venlafaxine this weekend per psychiatrist. Not needing prn lorazepam during wean. Will continue to follow with psychiatry and counseling.    Daily headaches, muffled hearing in left ear and now some diminished hearing in right ear but only when phone is up to ear (not with speaker phone), very occasional central chest tightness not always related to anxiety symptoms, thinks it might be reflux.     Memory issues for about the past year, forgets where she is going sometimes when driving and forgets major events etc, which is very unusual for her. Had a  "normal brain MRI in [place] and was referred to neurology but moved here before that appointment. Thinks symptoms might be related to her depression/anxiety, but would still like to complete evaluation with neurology here.     Hospital Follow-up Visit:    Hospital/Nursing Home/IP Rehab Facility: [facility]  Date of Admission: 10/3/23  Date of Discharge: 10/17/23  Reason(s) for Admission: Depression with anxiety     Was your hospitalization related to COVID-19? No   Problems taking medications regularly:  None  Medication changes since discharge: new medication prescribe by outside provider  Problems adhering to non-medication therapy:  None    Summary of hospitalization:  [facility] discharge summary reviewed  Diagnostic Tests/Treatments reviewed.  Follow up needed: none  Other Healthcare Providers Involved in Patient s Care:         None  Update since discharge: stable.     Plan of care communicated with patient      Review of Systems   12 point ROS reviewed and negative, except as otherwise noted.        Objective    /81 (BP Location: Right arm, Patient Position: Chair, Cuff Size: Adult Regular)   Pulse 64   Temp 97.9  F (36.6  C) (Oral)   Resp 16   Ht 1.727 m (5' 8\")   Wt 76 kg (167 lb 8 oz)   SpO2 98%   BMI 25.47 kg/m    Body mass index is 25.47 kg/m .  Physical Exam     Gen: awake and alert, no apparent distress, appears stated age, sitting comfortably upright in chair  HEENT: head atraumatic and normocephalic, hearing grossly normal, PERRLA, EOM grossly intact, no conjunctival injection or icterus, no nasal drainage, no oral ulcers, normal dentition, moist mucous membranes  Neck: smooth, round, non-tender single nodule to right lobe of thyroid, neck otherwise supple, no lymphadenopathy, no stiffness, normal ROM  Back: spine is straight, spine and paraspinal muscles non-tender to palpation throughout, full ROM  CV: RRR, normal S1 and S2, no " murmurs, rubs, or gallops, normal radial pulses, normal capillary refill.  Pulm: CTAB, no wheezes, rhonchi, or rales. No increased WOB on room air.  Abd: soft, non-tender, non-distended, normal bowel sounds throughout.  Ext: no LE edema, no joint swelling, full ROM of all joints in upper and lower extremities  Skin: warm and dry, no rashes, pallor, cyanosis, jaundice, or bruising to visible skin.  Neuro: A&Ox3, answers questions appropriately, normal speech, CN II-XII grossly intact, normal muscle tone, moves all extremities equally.  Psych: normal affect, makes good eye contact        ----- Service Performed and Documented by Resident or Fellow ------              Answers submitted by the patient for this visit:  Patient Health Questionnaire (Submitted on 10/19/2023)  If you checked off any problems, how difficult have these problems made it for you to do your work, take care of things at home, or get along with other people?: Extremely difficult  PHQ9 TOTAL SCORE: 22        10/30/2012    10:50 AM 10/8/2023     5:25 PM 10/16/2023     9:38 AM   LUCIAN-7 SCORE   Total Score 9     Total Score  19 (severe anxiety)    Total Score  19 21

## 2023-10-20 ENCOUNTER — HOSPITAL ENCOUNTER (OUTPATIENT)
Dept: BEHAVIORAL HEALTH | Facility: CLINIC | Age: 58
Discharge: HOME | End: 2023-10-20
Attending: PSYCHIATRY & NEUROLOGY
Payer: COMMERCIAL

## 2023-10-20 PROCEDURE — 90853 GROUP PSYCHOTHERAPY: CPT | Performed by: SOCIAL WORKER

## 2023-10-20 PROCEDURE — 90853 GROUP PSYCHOTHERAPY: CPT

## 2023-10-20 NOTE — ADDENDUM NOTE
Encounter addended by: Liza Mai [facility] on: 10/20/2023 3:05 PM   Actions taken: Clinical Note Signed

## 2023-10-20 NOTE — GROUP NOTE
Psychotherapy Group Note    PATIENT'S NAME: Angi Canales  MRN:   [id number]  :   1965  ACCT. NUMBER: [id number]  DATE OF SERVICE: 10/20/23  START TIME: 10:00 AM  END TIME: 10:50 AM  FACILITATOR: Diana Sykes LGSW  TOPIC: MH EBP Group: Self-Awareness  [facility] 55+ Program  TRACK: IOP 5    NUMBER OF PARTICIPANTS: 7    Summary of Group / Topics Discussed:  Self-Awareness: Values: Patients identified personal values by examining development of their current values and how their values influence their daily functioning and life choices. Patients explored the impact of their values on their thoughts, feelings, and actions. Patients discussed definition of personal values and how they develop and change over time. The goal is to help patients reconcile value conflicts and achieve balance and flexibility to improve mood and daily functioning.     Patient Session Goals / Objectives:  Examined development of values and impact of values on functioning  Identified and prioritized important values related to current well-being   Identified strategies to change or enhance values to positively impact symptoms  Assisted patients to find ways to adapt functioning to better fit their values      Patient Participation / Response:  Fully participated with the group by sharing personal reflections / insights and openly received / provided feedback with other participants.    Demonstrated understanding of topics discussed through group discussion and participation, Demonstrated understanding of values, strengths, and challenges to learn about themselves, Identified / Expressed readiness to act intentionally, increase self-compassion, promote personal growth, Verbalized understanding of ways to proactively manage illness, and Identified plan to address barriers to practicing skills that promote self-awareness     Treatment Plan:  Patient has a current master individualized treatment plan.  See Epic treatment plan for  more information.    Diana Sykes LGSW

## 2023-10-20 NOTE — GROUP NOTE
"Psychotherapy Group Note    PATIENT'S NAME: Angi Canales  MRN:   [id number]  :   1965  ACCT. NUMBER: [id number]  DATE OF SERVICE: 10/20/23  START TIME: 11:00 AM  END TIME: 11:50 AM  FACILITATOR: Liza Mai LICSW  TOPIC:  EBP Group: Self-Awareness  [facility] 55+ [facility]  TRACK: IOP 5    NUMBER OF PARTICIPANTS: 6    Summary of Group / Topics Discussed:  Self-Awareness: Gratitude: Topic focused on assisting patients in identifying key concepts in gratitude. Patients discussed the benefits of practicing gratitude and its impact on mood improvement, mindfulness, and perspective. Patients worked to increase time spent on recognition and appreciation of what is positive and working in their lives. Patients discussed the concepts and benefits of feeling grateful. The goal is to reduce rumination and negative thinking resulting in increased mindfulness and resilience. Patients specifically discussed how they can practice and problem solve barriers to daily gratitude practice.     Patient Session Goals / Objectives:  Brayton the concept and benefits of gratitude  Identified ways to practice gratitude in daily life  Problem solved barriers to practicing gratitude  Engaged in  \"Gladly Go\" and discussion.      Patient Participation / Response:  Fully participated with the group by sharing personal reflections / insights and openly received / provided feedback with other participants.    Demonstrated understanding of topics discussed through group discussion and participation, Identified / Expressed readiness to act intentionally, increase self-compassion, promote personal growth, and Practiced skills in session    Treatment Plan:  Patient has a current master individualized treatment plan.  See Epic treatment plan for more information.    RAYMUNDO Headley   "

## 2023-10-20 NOTE — GROUP NOTE
Process Group Note    PATIENT'S NAME: Angi Canales  MRN:   [id number]  :   1965  ACCT. NUMBER: [id number]  DATE OF SERVICE: 10/20/23  START TIME:  9:00 AM  END TIME:  9:50 AM  FACILITATOR: Liza Mai LICSW  TOPIC:  Process Group    Diagnoses:  296.23 (F32.2) Major Depressive Disorder, Single Episode, Severe _ and With melancholic features  300.02 (F41.1) Generalized Anxiety Disorder.  4. Other Diagnoses that is relevant to services:   300.01 (F41.0) Panic Disorder    [facility] 55+ Program  TRACK: IOP 5    NUMBER OF PARTICIPANTS: 8        Data:    Session content: At the start of this group, patients were invited to check in by identifying themselves, describing their current emotional status, and identifying issues to address in this group.   Area(s) of treatment focus addressed in this session included Symptom Management, Personal Safety, Community Resources/Discharge Planning, Abstinence/Relapse Prevention, Develop / Improve Independent Living Skills, and Develop Socialization / Interpersonal Relationship Skills.    Angi reports depressed and anxious mood with worry, rumination and periods of frustration. Denies S/I or safety issues. She reported on her feeling when getting lost while driving. Angi is focused on task management at home. She continues to train her dog with the transition of moving into the new apartment. She was able to align and establish care with a new PCP provider yesterday. She obtained a referral for a Neurology consult.  She reports medication compliance and she is reducing her Celexa and will be off of this medication tomorrow. She will start a new medication (name unknown).  Angi has a weekend plan. Writer met with pt to initiate treatment plan goals.       Therapeutic Interventions/Treatment Strategies:  Psychotherapist offered support, feedback and validation and reinforced use of skills. Treatment modalities used include Cognitive Behavioral  Therapy.    Assessment:    Patient response:   Patient responded to session by accepting feedback, giving feedback, listening, focusing on goals, being attentive, accepting support, and verbalizing understanding    Possible barriers to participation / learning include: and no barriers identified    Health Issues:   Yes: Sleep disturbance, Associated Psychological Distress    Medication compliance. Last day of Celexa medication that she is reducing and will be off tomorrow. She will then start a new medication (name unknown).        Substance Use Review:   Substance Use: No active concerns identified.    Mental Status/Behavioral Observations  Appearance:   Appropriate   Eye Contact:   Good   Psychomotor Behavior: Normal   Attitude:   Cooperative  Interested Pleasant  Orientation:   All  Speech   Rate / Production: Normal    Volume:  Normal   Mood:    Anxious  Depressed   Affect:    Appropriate  Worrisome   Thought Content:   Clear and Safety denies any current safety concerns including suicidal ideation, self-harm, and homicidal ideation  Thought Form:  Coherent  Goal Directed  Logical     Insight:    Good     Plan:   Safety Plan: No current safety concerns identified.  Recommended that patient call 911 or go to the local ED should there be a change in any of these risk factors.   Barriers to treatment: None identified  Patient Contracts (see media tab):  None  Substance Use: Not addressed in session   Continue or Discharge: Patient will continue in 55+ Program (55+) as planned. Patient is likely to benefit from learning and using skills as they work toward the goals identified in their treatment plan.      Liza Mai, [facility] HospitalSW  October 20, 2023

## 2023-10-23 ENCOUNTER — HOSPITAL ENCOUNTER (OUTPATIENT)
Dept: BEHAVIORAL HEALTH | Facility: CLINIC | Age: 58
Discharge: HOME | End: 2023-10-23
Attending: PSYCHIATRY & NEUROLOGY
Payer: COMMERCIAL

## 2023-10-23 PROCEDURE — 90853 GROUP PSYCHOTHERAPY: CPT

## 2023-10-23 PROCEDURE — 90853 GROUP PSYCHOTHERAPY: CPT | Performed by: SOCIAL WORKER

## 2023-10-23 NOTE — GROUP NOTE
Psychotherapy Group Note    PATIENT'S NAME: Angi Canales  MRN:   [id number]  :   1965  ACCT. NUMBER: [id number]  DATE OF SERVICE: 10/23/23  START TIME: 10:00 AM  END TIME: 10:50 AM  FACILITATOR: Steve Nolen LGSW  TOPIC:  EBP Group: Coping Skills  [facility] 55+ Program  TRACK: IOP 5    NUMBER OF PARTICIPANTS: 6    Summary of Group / Topics Discussed:  Coping Skills: Radical Acceptance: Patients learned radical acceptance as a way to tolerate heightened stress in the moment.  Patients identified situations that necessitate radical acceptance.  They focused on applying acceptance of the moment to tolerate difficult emotions and events. Patients discussed how to distinguish when this can be useful in their lives and when other skills are more relevant or helpful.    Patient Session Goals / Objectives:  Understand that some amount of pain exists for each of us in our lives  Process the difficulty of acceptance in our lives and benefits of radical acceptance to mood and functioning.  Demonstrate understanding of when to use the radical acceptance to tolerate distress by providing examples of situations where this could be applied.  Identify barriers to applying radical acceptance to difficult situations and explore strategies to overcome them      Patient Participation / Response:  Fully participated with the group by sharing personal reflections / insights and openly received / provided feedback with other participants.    Demonstrated understanding of topics discussed through group discussion and participation, Expressed understanding of the relevance / importance of coping skills at distressing times in life, and Demonstrated knowledge of when to consider using a variety of coping skills in daily life    Pt related the material to personal experience and the difficulties associated w/making change.    Treatment Plan:  Patient has a current master individualized treatment plan.  See Epic treatment  plan for more information.    Steve Nolen LGSW

## 2023-10-23 NOTE — GROUP NOTE
Process Group Note    PATIENT'S NAME: Angi Canales  MRN:   [id number]  :   1965  ACCT. NUMBER: [id number]  DATE OF SERVICE: 10/23/23  START TIME:  9:00 AM  END TIME:  9:50 AM  FACILITATOR: Liza Mai LICSW  TOPIC:  Process Group    Diagnoses:  296.23 (F32.2) Major Depressive Disorder, Single Episode, Severe _ and With melancholic features  300.02 (F41.1) Generalized Anxiety Disorder.  4. Other Diagnoses that is relevant to services:   300.01 (F41.0) Panic Disorder    [facility] 55+ Program  TRACK: IOP 5    NUMBER OF PARTICIPANTS: 6        Data:    Session content: At the start of this group, patients were invited to check in by identifying themselves, describing their current emotional status, and identifying issues to address in this group.   Area(s) of treatment focus addressed in this session included Symptom Management, Personal Safety, Community Resources/Discharge Planning, Abstinence/Relapse Prevention, Develop / Improve Independent Living Skills, and Develop Socialization / Interpersonal Relationship Skills.    Angi reports less depressed and less anxious symptoms today. She reports worry, interrupted sleep and increase energy. She has been taking Wellbutrin for the past one and half weeks and started Effexor over the weekend. Angi continues to have worry about her dog's transition to the new apartment and her leaving during the day. She is monitoring her dog through a cam. She has worry that he will bark.  Angi is beginning to track her pattern of mood symptoms with the Mood Fit norma. She felt proud for spending time with a supportive girlfriend. She was able to go out for breakfast. Angi reported on themes within her support system and friendships.       Therapeutic Interventions/Treatment Strategies:  Psychotherapist offered support, feedback and validation and reinforced use of skills. Treatment modalities used include Cognitive Behavioral  Therapy.    Assessment:    Patient response:   Patient responded to session by accepting feedback, giving feedback, listening, focusing on goals, being attentive, accepting support, and verbalizing understanding    Possible barriers to participation / learning include: and no barriers identified    Health Issues:   Yes: Sleep disturbance, No Psychological Distress    Reports medication compliance.       Substance Use Review:   Substance Use: No active concerns identified.    Mental Status/Behavioral Observations  Appearance:   Appropriate   Eye Contact:   Good   Psychomotor Behavior: Normal   Attitude:   Cooperative  Interested Pleasant  Orientation:   All  Speech   Rate / Production: Normal    Volume:  Normal   Mood:    Less depressed mood, anxious mood  Affect:    Appropriate  Worrisome   Thought Content:   Clear and Safety denies any current safety concerns including suicidal ideation, self-harm, and homicidal ideation  Thought Form:  Coherent  Goal Directed  Logical     Insight:    Good     Plan:   Safety Plan: No current safety concerns identified.  Recommended that patient call 911 or go to the local ED should there be a change in any of these risk factors.   Barriers to treatment: None identified  Patient Contracts (see media tab):  None  Substance Use: Not addressed in session   Continue or Discharge: Patient will continue in 55+ Program (55+) as planned. Patient is likely to benefit from learning and using skills as they work toward the goals identified in their treatment plan.      Liza Mai, [facility]  October 23, 2023

## 2023-10-23 NOTE — GROUP NOTE
Psychotherapy Group Note    PATIENT'S NAME: Angi Canales  MRN:   [id number]  :   1965  ACCT. NUMBER: [id number]  DATE OF SERVICE: 10/23/23  START TIME: 11:00 AM  END TIME: 11:50 AM  FACILITATOR: Liza Mai LICSW  TOPIC: MH EBP Group: Cognitive Restructuring  [facility] 55+ [facility]  TRACK: [facility] 5    NUMBER OF PARTICIPANTS: 6    Summary of Group / Topics Discussed:  Cognitive Restructuring: Distortions: Patients received an overview of how to identify common cognitive distortions. Patients will explore alternatives to cognitive distortions and practice challenging their negative thought patterns. The goal is to help patients target modify ineffective thought patterns.     Patient Session Goals / Objectives:  Familiarized self with ineffective / unhealthy thoughts and how they develop.    Explored impact of ineffective thoughts / distortions on mood and activity  Formulated new neutral/positive alternatives to challenge less helpful / ineffective thoughts.  Practiced and plan to apply in daily life             Patient Participation / Response:  Fully participated with the group by sharing personal reflections / insights and openly received / provided feedback with other participants.    Demonstrated understanding of topics discussed through group discussion and participation and Identified / Expressed personal readiness to practice CBT    Treatment Plan:  Patient has a current master individualized treatment plan.  See Epic treatment plan for more information.    RAYMUNDO Headley

## 2023-10-24 ENCOUNTER — HOSPITAL ENCOUNTER (OUTPATIENT)
Dept: BEHAVIORAL HEALTH | Facility: CLINIC | Age: 58
Discharge: HOME | End: 2023-10-24
Attending: PSYCHIATRY & NEUROLOGY
Payer: COMMERCIAL

## 2023-10-24 ENCOUNTER — ANCILLARY PROCEDURE (OUTPATIENT)
Dept: ULTRASOUND IMAGING | Facility: CLINIC | Age: 58
End: 2023-10-24
Attending: INTERNAL MEDICINE
Payer: COMMERCIAL

## 2023-10-24 DIAGNOSIS — E04.1 THYROID NODULE: ICD-10-CM

## 2023-10-24 PROCEDURE — 90853 GROUP PSYCHOTHERAPY: CPT | Performed by: SOCIAL WORKER

## 2023-10-24 PROCEDURE — 76536 US EXAM OF HEAD AND NECK: CPT

## 2023-10-24 PROCEDURE — 90853 GROUP PSYCHOTHERAPY: CPT

## 2023-10-24 NOTE — GROUP NOTE
Psychotherapy Group Note    PATIENT'S NAME: Angi Canales  MRN:   [id number]  :   1965  ACCT. NUMBER: [id number]  DATE OF SERVICE: 10/24/23  START TIME: 11:00 AM  END TIME: 11:50 AM  FACILITATOR: Liza Mai LICSW  TOPIC: MH EBP Group: Cognitive Restructuring  [facility] 55+ Program  TRACK: IOP 5    NUMBER OF PARTICIPANTS: 8    Summary of Group / Topics Discussed:  Cognitive Restructuring: Core Beliefs: Patients received an overview of what a core belief is, and how they develop. Patients then began to identify their negative core beliefs. Patients worked to modify core beliefs with the goal of improved self-image and functioning.     Patient Session Goals / Objectives:  Familiarize self with the concept of core beliefs and how they develop.    Explore personal core beliefs (positive and negative)  Develop / advance recognition of the connection between negative thoughts and negative core beliefs.  Formulate new neutral/positive core beliefs             Patient Participation / Response:  Fully participated with the group by sharing personal reflections / insights and openly received / provided feedback with other participants.    Demonstrated understanding of topics discussed through group discussion and participation and Practiced skills in session    Treatment Plan:  Patient has a current master individualized treatment plan.  See Epic treatment plan for more information.    RAYMUNDO Headley

## 2023-10-24 NOTE — GROUP NOTE
Psychotherapy Group Note    PATIENT'S NAME: Angi Canales  MRN:   [id number]  :   1965  ACCT. NUMBER: [id number]  DATE OF SERVICE: 10/24/23  START TIME: 10:00 AM  END TIME: 10:50 AM  FACILITATOR: Kelsey Sue LGSW  TOPIC: MH EBP Group: Coping Skills  [facility]  TRACK: IOP 5    NUMBER OF PARTICIPANTS: 9    Summary of Group / Topics Discussed:  Coping Skills: Building Positive Experiences: Patients discussed the importance of planning and engaging in positive experiences, as strategies to increase positive thinking, hope, and self-worth.  Explored the benefits of planning / creating positive experiences, including recognizing and reducing negativity bias by focusing on and building positive experiences.   Several approaches to building positive experiences were presented and discussed relevant to each patient.      Patient Session Goals / Objectives:  Understand the purpose of planning / creating / participating / sharing in positive experiences.  Explore patient s experiences related to negative thinking and how it influences activities and moodIdentify current positive events in patient s life.   Set goals to increase a variety of positive experiences.  Address barriers to planning / engaging in positive experiences.      Patient Participation / Response:  Fully participated with the group by sharing personal reflections / insights and openly received / provided feedback with other participants.    Demonstrated understanding of topics discussed through group discussion and participation, Expressed understanding of the relevance / importance of coping skills at distressing times in life, and Demonstrated knowledge of when to consider using a variety of coping skills in daily life    Treatment Plan:  Patient has a current master individualized treatment plan.  See Epic treatment plan for more information.    CIARA Baez

## 2023-10-24 NOTE — GROUP NOTE
Process Group Note    PATIENT'S NAME: Angi Canales  MRN:   [id number]  :   1965  ACCT. NUMBER: [id number]  DATE OF SERVICE: 10/24/23  START TIME:  9:00 AM  END TIME:  9:50 AM  FACILITATOR: Liza Mai LICSW  TOPIC:  Process Group    Diagnoses:  296.23 (F32.2) Major Depressive Disorder, Single Episode, Severe _ and With melancholic features  300.02 (F41.1) Generalized Anxiety Disorder.  4. Other Diagnoses that is relevant to services:   300.01 (F41.0) Panic Disorder    [facility] 55+ Program  TRACK: IOP 5    NUMBER OF PARTICIPANTS: 9        Data:    Session content: At the start of this group, patients were invited to check in by identifying themselves, describing their current emotional status, and identifying issues to address in this group.   Area(s) of treatment focus addressed in this session included Symptom Management, Personal Safety, Community Resources/Discharge Planning, Abstinence/Relapse Prevention, Develop / Improve Independent Living Skills, and Develop Socialization / Interpersonal Relationship Skills.    Angi reports anxious mood and less depressed mood. Denies S/I or safety issues. She is using coping skills for symptom management including Mood Fit Jacqui for symptom monitoring and cognitive restructuring (CBT).   Angi reported on the transition of being in a new home with her dog. She will bring the dog to the Vet due to barking when she is not home.  Angi is focused on making a list for task management. She continues to be on medical leave from work.  Angi signed and given copy of treatment plan.       Therapeutic Interventions/Treatment Strategies:  Psychotherapist offered support, feedback and validation and reinforced use of skills. Treatment modalities used include Cognitive Behavioral Therapy.    Assessment:    Patient response:   Patient responded to session by accepting feedback, giving feedback, listening, focusing on goals, being attentive, and  verbalizing understanding    Possible barriers to participation / learning include: and no barriers identified    Health Issues:   None reported. Medication compliance.       Substance Use Review:   Substance Use: No active concerns identified.    Mental Status/Behavioral Observations  Appearance:   Appropriate   Eye Contact:   Good   Psychomotor Behavior: Normal   Attitude:   Cooperative  Interested  Orientation:   All  Speech   Rate / Production: Normal    Volume:  Normal   Mood:    Anxious  Less depressed  Affect:    Appropriate  Worrisome   Thought Content:   Clear and Safety denies any current safety concerns including suicidal ideation, self-harm, and homicidal ideation  Thought Form:  Coherent  Goal Directed  Logical     Insight:    Good     Plan:   Safety Plan: No current safety concerns identified.  Recommended that patient call 911 or go to the local ED should there be a change in any of these risk factors.   Barriers to treatment: None identified  Patient Contracts (see media tab):  None  Substance Use: Not addressed in session   Continue or Discharge: Patient will continue in 55+ Program (55+) as planned. Patient is likely to benefit from learning and using skills as they work toward the goals identified in their treatment plan.      Liza Mai, [facility]  October 24, 2023

## 2023-10-25 ENCOUNTER — HOSPITAL ENCOUNTER (OUTPATIENT)
Dept: BEHAVIORAL HEALTH | Facility: CLINIC | Age: 58
Discharge: HOME | End: 2023-10-25
Attending: PSYCHIATRY & NEUROLOGY
Payer: COMMERCIAL

## 2023-10-25 PROCEDURE — 90853 GROUP PSYCHOTHERAPY: CPT | Performed by: SOCIAL WORKER

## 2023-10-25 NOTE — GROUP NOTE
Process Group Note    PATIENT'S NAME: Angi Canales  MRN:   [id number]  :   1965  ACCT. NUMBER: [id number]  DATE OF SERVICE: 10/25/23  START TIME:  9:00 AM  END TIME:  9:50 AM  FACILITATOR: Liza Mai LICSW  TOPIC:  Process Group    Diagnoses:  296.23 (F32.2) Major Depressive Disorder, Single Episode, Severe _ and With melancholic features  300.02 (F41.1) Generalized Anxiety Disorder.  300.01 (F41.0) Panic Disorder    [facility] 55+ Program  TRACK: IOP 5    NUMBER OF PARTICIPANTS: 9        Data:    Session content: At the start of this group, patients were invited to check in by identifying themselves, describing their current emotional status, and identifying issues to address in this group.   Area(s) of treatment focus addressed in this session included Symptom Management, Personal Safety, Community Resources/Discharge Planning, Abstinence/Relapse Prevention, Develop / Improve Independent Living Skills, and Develop Socialization / Interpersonal Relationship Skills.    Angi reports less anxious mood with worry today. She reports anxiety is more intermittent today. Denies S/I or safety issues. She feels more calm.  Angi was able to talk with a Vet about her dog who prescribed Trazadone. She continues to assist the dog with the transition of a new home.  Angi reports having more self acceptance. She will focus on meditation with malas that she has made with beads. She will have more ease today due to a more productive routine tomorrow.     Therapeutic Interventions/Treatment Strategies:  Psychotherapist offered support, feedback and validation and reinforced use of skills. Treatment modalities used include Cognitive Behavioral Therapy.    Assessment:    Patient response:   Patient responded to session by accepting feedback, giving feedback, listening, focusing on goals, being attentive, accepting support, and verbalizing understanding    Possible barriers to participation / learning  include: and no barriers identified    Health Issues:   None reported. Reports medication compliance.        Substance Use Review:   Substance Use: No active concerns identified.    Mental Status/Behavioral Observations  Appearance:   Appropriate   Eye Contact:   Good   Psychomotor Behavior: Normal   Attitude:   Cooperative  Interested  Orientation:   All  Speech   Rate / Production: Normal    Volume:  Normal   Mood:    Less anxious and less depressed mood today  Affect:    Appropriate  Worrisome   Thought Content:   Clear and Safety denies any current safety concerns including suicidal ideation, self-harm, and homicidal ideation  Thought Form:  Coherent  Goal Directed  Logical     Insight:    Good     Plan:   Safety Plan: No current safety concerns identified.  Recommended that patient call 911 or go to the local ED should there be a change in any of these risk factors.   Barriers to treatment: None identified  Patient Contracts (see media tab):  None  Substance Use: Not addressed in session   Continue or Discharge: Patient will continue in 55+ Program (55+) as planned. Patient is likely to benefit from learning and using skills as they work toward the goals identified in their treatment plan.      Liza Mai, [facility]  October 25, 2023

## 2023-10-26 NOTE — RESULT ENCOUNTER NOTE
Dear Angi,    Your thyroid scan showed 2 nodules that look very benign.  You do not need any further surveillance or imaging for these, which is great news.    Please feel free to call with any questions.  Otherwise, we can discuss further at your next appointment.    Sincerely,    Rashmi Miramontes MD

## 2023-10-27 ENCOUNTER — HOSPITAL ENCOUNTER (OUTPATIENT)
Dept: BEHAVIORAL HEALTH | Facility: CLINIC | Age: 58
Discharge: HOME | End: 2023-10-27
Attending: PSYCHIATRY & NEUROLOGY
Payer: COMMERCIAL

## 2023-10-27 PROCEDURE — 90853 GROUP PSYCHOTHERAPY: CPT

## 2023-10-27 PROCEDURE — 90853 GROUP PSYCHOTHERAPY: CPT | Performed by: SOCIAL WORKER

## 2023-10-27 NOTE — GROUP NOTE
Psychotherapy Group Note    PATIENT'S NAME: Angi Canales  MRN:   [id number]  :   1965  ACCT. NUMBER: [id number]  DATE OF SERVICE: 10/27/23  START TIME: 11:00 AM  END TIME: 11:50 AM  FACILITATOR: Diana Sykes LGSW  TOPIC:  EBP Group: Coping Skills  [facility] 55+ Program  TRACK: [facility] 5 55+    NUMBER OF PARTICIPANTS: 7    Summary of Group / Topics Discussed:  Coping Skills: Additional Coping Skills: Coping with winter blues/season changes Patients discussed and brainstormed strategies to cope with seasonal mood changes and isolation.  Reviewed the benefits of applying the aforementioned coping strategies.  Patients explored how these strategies might be applied to daily stressors or distressing situations.    Patient Session Goals / Objectives:  Understand the purpose and benefits of applying coping strategies in the winter time  Brainstormed indoor and outdoor activities and self-soothe strategies to get through the winter  Discussed benefits of using a SAD light during the winter.  Address barriers to utilizing coping skills when in distress.      Patient Participation / Response:  Fully participated with the group by sharing personal reflections / insights and openly received / provided feedback with other participants.    Demonstrated understanding of topics discussed through group discussion and participation, Expressed understanding of the relevance / importance of coping skills at distressing times in life, Demonstrated knowledge of when to consider using a variety of coping skills in daily life, Identified barriers to applying coping skills, Identified 2-3 positive coping strategies that have helped maintain / improve symptoms in the past, and Identified / Expressed personal readiness to practice new coping skills    Treatment Plan:  Patient has a current master individualized treatment plan.  See Epic treatment plan for more information.    CIARA Orosco

## 2023-10-27 NOTE — GROUP NOTE
Psychotherapy Group Note    PATIENT'S NAME: Angi Canales  MRN:   [id number]  :   1965  ACCT. NUMBER: [id number]  DATE OF SERVICE: 10/27/23  START TIME: 10:00 AM  END TIME: 10:50 AM  FACILITATOR: Liza Mai LICSW  TOPIC:  EBP Group: Self-Awareness  [facility] [place] 55+ Program  TRACK: IOP 5     NUMBER OF PARTICIPANTS: 7    Summary of Group / Topics Discussed:  Self-Awareness: Self-Compassion: Patients received overview of key concepts in developing self-compassion. Patients discussed mindfulness, self-kindness, and finding common humanity. Patients identified their current approach to problems in their lives and learned skills for increasing self-compassion. Patients identified ways they can put self-compassion skills into practice and problem solve barriers to application of skills.     Patient Session Goals / Objectives:  [place] components of self-compassion including the SACRED Self  Identify ways to practice self-compassion in daily life  Problem solve barriers to self-compassion practice      Patient Participation / Response:  Fully participated with the group by sharing personal reflections / insights and openly received / provided feedback with other participants.    Demonstrated understanding of topics discussed through group discussion and participation and Practiced skills in session    Treatment Plan:  Patient has a current master individualized treatment plan.  See Epic treatment plan for more information.    RAYMUNDO Headley

## 2023-10-27 NOTE — GROUP NOTE
Process Group Note    PATIENT'S NAME: Angi Canales  MRN:   [id number]  :   1965  ACCT. NUMBER: [id number]  DATE OF SERVICE: 10/27/23  START TIME:  9:00 AM  END TIME:  9:50 AM  FACILITATOR: Liza Mai LICSW  TOPIC:  Process Group    Diagnoses:  296.23 (F32.2) Major Depressive Disorder, Single Episode, Severe _ and With melancholic features  300.02 (F41.1) Generalized Anxiety Disorder.  4. Other Diagnoses that is relevant to services:   300.01 (F41.0) Panic Disorder    [facility] 55+ Program  TRACK: IOP 5 (A2)    NUMBER OF PARTICIPANTS: 8        Data:    Session content: At the start of this group, patients were invited to check in by identifying themselves, describing their current emotional status, and identifying issues to address in this group.   Area(s) of treatment focus addressed in this session included Symptom Management, Personal Safety, Community Resources/Discharge Planning, Abstinence/Relapse Prevention, Develop / Improve Independent Living Skills, and Develop Socialization / Interpersonal Relationship Skills.    Angi reports sad mood, anxious mood with worry and periods of tearfulness. Denies S/I or safety issues. She processed having her first appointment  with her outpatient psychotherapist. She was able to self disclose life events leading her to IOP admission. Angi is using coping skills for symptom management including breathing, mantras with malas. Angi continues to support the home transition with her anxious dog. He is taking Trazadone. She has been using the Mood Jacqui but will find another one that has a different weekly report. She has a weekend plan.       Therapeutic Interventions/Treatment Strategies:  Psychotherapist offered support, feedback and validation and reinforced use of skills. Treatment modalities used include Cognitive Behavioral Therapy.    Assessment:    Patient response:   Patient responded to session by accepting feedback, giving feedback,  listening, focusing on goals, being attentive, accepting support, and verbalizing understanding    Possible barriers to participation / learning include: and no barriers identified    Health Issues:   None reported. She reports typically having medication compliance. She forgot to take medication yesterday due to change in routine without program.  She is using a pill box.       Substance Use Review:   Substance Use: No active concerns identified.    Mental Status/Behavioral Observations  Appearance:   Appropriate   Eye Contact:   Good   Psychomotor Behavior: Normal   Attitude:   Cooperative  Interested  Orientation:   All  Speech   Rate / Production: Normal    Volume:  Normal   Mood:    Anxious  Sad   Affect:    Appropriate  Worrisome   Thought Content:   Clear and Safety denies any current safety concerns including suicidal ideation, self-harm, and homicidal ideation  Thought Form:  Coherent  Goal Directed  Logical     Insight:    Good     Plan:   Safety Plan: No current safety concerns identified.  Recommended that patient call 911 or go to the local ED should there be a change in any of these risk factors.   Barriers to treatment: None identified  Patient Contracts (see media tab):  None  Substance Use: Not addressed in session   Continue or Discharge: Patient will continue in 55+ Program (55+) as planned. Patient is likely to benefit from learning and using skills as they work toward the goals identified in their treatment plan.      Liza Mai, [facility]  October 27, 2023

## 2023-10-30 ENCOUNTER — HOSPITAL ENCOUNTER (OUTPATIENT)
Dept: BEHAVIORAL HEALTH | Facility: CLINIC | Age: 58
Discharge: HOME | End: 2023-10-30
Attending: PSYCHIATRY & NEUROLOGY
Payer: COMMERCIAL

## 2023-10-30 PROCEDURE — 90853 GROUP PSYCHOTHERAPY: CPT | Performed by: SOCIAL WORKER

## 2023-10-30 PROCEDURE — 90853 GROUP PSYCHOTHERAPY: CPT

## 2023-10-30 NOTE — GROUP NOTE
Psychotherapy Group Note    PATIENT'S NAME: Angi Canales  MRN:   [id number]  :   1965  ACCT. NUMBER: [id number]  DATE OF SERVICE: 10/30/23  START TIME: 11:00 AM  END TIME: 11:50 AM  FACILITATOR: Liza Mai LICSW  TOPIC: MH EBP Group: [facility] 55+ Program  TRACK: [facility] 5 (A2)    NUMBER OF PARTICIPANTS: 7    Summary of Group / Topics Discussed:  Mindfulness: Mindfulness Experiential: Patients received an overview on what mindfulness is and how mindfulness can benefit general health, mental health symptoms, and stressors. Patients discussed current awareness, knowledge, and practice of mindfulness skills. Patients also discussed barriers to mindfulness practice.    Patient Session Goals / Objectives:  Demonstrated and verbalized understanding of key mindfulness concepts  Identified when/how to use mindfulness skills  Identified plan to use mindfulness skills in daily life   Identified self awareness by naming recovery needs using art media.       Patient Participation / Response:  Fully participated with the group by sharing personal reflections / insights and openly received / provided feedback with other participants.    Demonstrated understanding of topics discussed through group discussion and participation, Demonstrated understanding of mindfulness skills and benefits of practice, and Practiced skills in session    Treatment Plan:  Patient has a current master individualized treatment plan.  See Epic treatment plan for more information.    RAYMUNDO Headley

## 2023-10-30 NOTE — GROUP NOTE
Psychotherapy Group Note    PATIENT'S NAME: Angi Canales  MRN:   [id number]  :   1965  ACCT. NUMBER: [id number]  DATE OF SERVICE: 10/30/23  START TIME: 10:00 AM  END TIME: 10:50 AM  FACILITATOR: Kelsey Sue LGSW  TOPIC: MH EBP Group: Coping Skills  [facility] Adult Mental Health Day Treatment  TRACK: IOP 5    NUMBER OF PARTICIPANTS: 7    Summary of Group / Topics Discussed:  Coping Skills: Grounding: Patients discussed and practiced strategies to increase attachment / presence to the current moment.  Patients identified situations in which using these strategies will help improve emotion regulation sense of calm in the body.  Reviewed the benefits of applying grounding strategies, as well as past / current practices of each member.  Patients identified situations in which using these strategies would reduce stress. They developed the ability to distinguish when these strategies can be useful in their lives for management and stress and psychological well-being.    Patient Session Goals / Objectives:  Understand the purpose of using grounding strategies to reduce stress.  Verbalize understanding of how and when to apply grounding strategies to reduce distress and increase presence in the moment.  Review patients current grounding practices and discuss a more formal way of practicing and accessing skills.  Practice using various calming strategies (e.g. 5-4-3-2-1; mental and body awareness).  Choose 1-2 grounding strategies to apply during times of distress.      Patient Participation / Response:  Fully participated with the group by sharing personal reflections / insights and openly received / provided feedback with other participants.    Demonstrated understanding of topics discussed through group discussion and participation, Expressed understanding of the relevance / importance of coping skills at distressing times in life, and Demonstrated knowledge of when to consider using a variety of  coping skills in daily life    Treatment Plan:  Patient has a current master individualized treatment plan.  See Epic treatment plan for more information.    CIARA Baez

## 2023-10-30 NOTE — GROUP NOTE
Process Group Note    PATIENT'S NAME: Angi Canales  MRN:   [id number]  :   1965  ACCT. NUMBER: [id number]  DATE OF SERVICE: 10/30/23  START TIME:  9:00 AM  END TIME:  9:50 AM  FACILITATOR: Liza Mai LICSW  TOPIC:  Process Group    Diagnoses:  296.23 (F32.2) Major Depressive Disorder, Single Episode, Severe _ and With melancholic features  300.02 (F41.1) Generalized Anxiety Disorder.  4. Other Diagnoses that is relevant to services:   300.01 (F41.0) Panic Disorder    [facility] 55+ [facility]  TRACK: IOP 5    NUMBER OF PARTICIPANTS: 7        Data:    Session content: At the start of this group, patients were invited to check in by identifying themselves, describing their current emotional status, and identifying issues to address in this group.   Area(s) of treatment focus addressed in this session included Symptom Management, Personal Safety, Community Resources/Discharge Planning, Abstinence/Relapse Prevention, Develop / Improve Independent Living Skills, and Develop Socialization / Interpersonal Relationship Skills.    Angi reports less depressed and less anxious mood. Denies S/I or safety issues. She is using coping skills for symptom management. She would like to be more intentional and disciplined between tasks for productivity and self care. She would like to exercise regularly. Angi was able to enjoy spending time with her daughter, son n law and 3 grandchildren over the weekend. Her dog is less anxious but continues to adjust to their new home.       Therapeutic Interventions/Treatment Strategies:  Psychotherapist offered support, feedback and validation and reinforced use of skills. Treatment modalities used include Cognitive Behavioral Therapy.    Assessment:    Patient response:   Patient responded to session by accepting feedback, giving feedback, listening, focusing on goals, being attentive, and verbalizing understanding    Possible barriers to participation / learning  include: and no barriers identified    Health Issues:   None reported. Reports medication compliance.       Substance Use Review:   Substance Use: No active concerns identified.    Mental Status/Behavioral Observations  Appearance:   Appropriate   Eye Contact:   Good   Psychomotor Behavior: Normal   Attitude:   Cooperative  Interested  Orientation:   All  Speech   Rate / Production: Normal    Volume:  Normal   Mood:    Less depressed and less anxious mood today  Affect:    Appropriate   Thought Content:   Clear and Safety denies any current safety concerns including suicidal ideation, self-harm, and homicidal ideation  Thought Form:  Coherent  Goal Directed  Logical     Insight:    Good     Plan:   Safety Plan: No current safety concerns identified.  Recommended that patient call 911 or go to the local ED should there be a change in any of these risk factors.   Barriers to treatment: None identified  Patient Contracts (see media tab):  None  Substance Use: Not addressed in session   Continue or Discharge: Patient will continue in 55+ Program (55+) as planned. Patient is likely to benefit from learning and using skills as they work toward the goals identified in their treatment plan.      Liza Mai, [facility]  October 30, 2023

## 2023-10-31 ENCOUNTER — HOSPITAL ENCOUNTER (OUTPATIENT)
Dept: BEHAVIORAL HEALTH | Facility: CLINIC | Age: 58
Discharge: HOME | End: 2023-10-31
Attending: PSYCHIATRY & NEUROLOGY
Payer: COMMERCIAL

## 2023-10-31 PROCEDURE — 90853 GROUP PSYCHOTHERAPY: CPT | Performed by: SOCIAL WORKER

## 2023-10-31 PROCEDURE — 90853 GROUP PSYCHOTHERAPY: CPT

## 2023-10-31 NOTE — GROUP NOTE
Process Group Note    PATIENT'S NAME: Angi Canales  MRN:   [id number]  :   1965  ACCT. NUMBER: [id number]  DATE OF SERVICE: 10/31/23  START TIME:  9:00 AM  END TIME:  9:50 AM  FACILITATOR: Liza Mai LICSW  TOPIC:  Process Group    Diagnoses:  296.23 (F32.2) Major Depressive Disorder, Single Episode, Severe _ and With melancholic features  300.02 (F41.1) Generalized Anxiety Disorder.  300.01 (F41.0) Panic Disorder    [facility] 55+ Program  TRACK: IOP 5 (A2)    NUMBER OF PARTICIPANTS: 6        Data:    Session content: At the start of this group, patients were invited to check in by identifying themselves, describing their current emotional status, and identifying issues to address in this group.   Area(s) of treatment focus addressed in this session included Symptom Management, Personal Safety, Community Resources/Discharge Planning, Abstinence/Relapse Prevention, Develop / Improve Independent Living Skills, and Develop Socialization / Interpersonal Relationship Skills.    Angi reports sad, depressed and anxious mood. Denies S/I or safety issues. She felt tired. Angi is using coping skills for symptom management. She continues to focus on task management at home and is learning ways to promote lifestyle balance.  Angi will make a lasagna for dinner and spend time with her family.      Therapeutic Interventions/Treatment Strategies:  Psychotherapist offered support, feedback and validation and reinforced use of skills. Treatment modalities used include Cognitive Behavioral Therapy.    Assessment:    Patient response:   Patient responded to session by accepting feedback, giving feedback, listening, focusing on goals, being attentive, accepting support, and verbalizing understanding    Possible barriers to participation / learning include: and no barriers identified    Health Issues:   None reported. Reports medication compliance.        Substance Use Review:   Substance Use: No  active concerns identified.    Mental Status/Behavioral Observations  Appearance:   Appropriate   Eye Contact:   Good   Psychomotor Behavior: Normal   Attitude:   Cooperative  Interested  Orientation:   All  Speech   Rate / Production: Normal    Volume:  Normal   Mood:    Depressed and  anxious mood  Affect:    Appropriate   Thought Content:   Clear and Safety denies any current safety concerns including suicidal ideation, self-harm, and homicidal ideation  Thought Form:  Coherent  Goal Directed  Logical     Insight:    Good     Plan:   Safety Plan: No current safety concerns identified.  Recommended that patient call 911 or go to the local ED should there be a change in any of these risk factors.   Barriers to treatment: None identified  Patient Contracts (see media tab):  None  Substance Use: Not addressed in session   Continue or Discharge: Patient will continue in 55+ Program (55+) as planned. Patient is likely to benefit from learning and using skills as they work toward the goals identified in their treatment plan.      Liza Mai, [facility]  October 31, 2023

## 2023-10-31 NOTE — GROUP NOTE
Psychotherapy Group Note    PATIENT'S NAME: Angi Canales  MRN:   [id number]  :   1965  ACCT. NUMBER: [id number]  DATE OF SERVICE: 10/31/23  START TIME: 10:00 AM  END TIME: 10:50 AM  FACILITATOR: Kelsey Sue LGSW  TOPIC: MH EBP Group: Coping Skills  [facility] Adult Mental [facility] Day Treatment  TRACK: IOP 5    NUMBER OF PARTICIPANTS: 6    Summary of Group / Topics Discussed:  Coping Skills: Grounding: Patients discussed and practiced strategies to increase attachment / presence to the current moment.  Patients identified situations in which using these strategies will help improve emotion regulation sense of calm in the body.  Reviewed the benefits of applying grounding strategies, as well as past / current practices of each member.  Patients identified situations in which using these strategies would reduce stress. They developed the ability to distinguish when these strategies can be useful in their lives for management and stress and psychological well-being.    Patient Session Goals / Objectives:  Understand the purpose of using grounding strategies to reduce stress.  Verbalize understanding of how and when to apply grounding strategies to reduce distress and increase presence in the moment.  Review patients current grounding practices and discuss a more formal way of practicing and accessing skills.  Practice using various calming strategies (e.g. 5-4-3-2-1; mental and body awareness).  Choose 1-2 grounding strategies to apply during times of distress.      Patient Participation / Response:  Fully participated with the group by sharing personal reflections / insights and openly received / provided feedback with other participants.    Demonstrated understanding of topics discussed through group discussion and participation, Expressed understanding of the relevance / importance of coping skills at distressing times in life, and Demonstrated knowledge of when to consider using a variety of  coping skills in daily life    Treatment Plan:  Patient has a current master individualized treatment plan.  See Epic treatment plan for more information.    CIARA Baez

## 2023-10-31 NOTE — GROUP NOTE
Psychotherapy Group Note    PATIENT'S NAME: Angi Canales  MRN:   [id number]  :   1965  ACCT. NUMBER: [id number]  DATE OF SERVICE: 10/31/23  START TIME: 11:00 AM  END TIME: 11:50 AM  FACILITATOR: Liza Mai LICSW  TOPIC: MH EBP Group: Coping Skills  [facility] 55+ Program  TRACK: [facility] 5 (A2)    NUMBER OF PARTICIPANTS: 6    Summary of Group / Topics Discussed:  Coping Skills: Improve the Moment with Affirmations: Patients learned to apply strategies to effect positive change in the present moment.  Patients created personal affirmations  to improve the moment and reduce distress. Patients discussed how to distinguish when this can be useful in their lives or when other strategies would be more relevant or helpful.    Patient Session Goals / Objectives:  Discuss how the use of intentional  in the moment  statements can promote mental health recovery as well as positive experiences.   Review patients current practices and discuss a more formal way of practicing affirmation skills.  Increase ability to decide when to use improve the moment strategies.       Patient Participation / Response:  Fully participated with the group by sharing personal reflections / insights and openly received / provided feedback with other participants.    Demonstrated understanding of topics discussed through group discussion and participation    Treatment Plan:  Patient has a current master individualized treatment plan.  See Epic treatment plan for more information.    RAYMUNDO Headley

## 2023-11-01 ENCOUNTER — HOSPITAL ENCOUNTER (OUTPATIENT)
Dept: BEHAVIORAL HEALTH | Facility: CLINIC | Age: 58
Discharge: HOME | End: 2023-11-01
Attending: PSYCHIATRY & NEUROLOGY
Payer: COMMERCIAL

## 2023-11-01 PROCEDURE — 90853 GROUP PSYCHOTHERAPY: CPT | Performed by: SOCIAL WORKER

## 2023-11-01 NOTE — GROUP NOTE
Psychotherapy Group Note    PATIENT'S NAME: Angi Canales  MRN:   [id number]  :   1965  ACCT. NUMBER: [id number]  DATE OF SERVICE: 23  START TIME: 10:00 AM  END TIME: 10:50 AM  FACILITATOR: Liza Mai LICSW  TOPIC: MH EBP Group: Behavioral Activation  [facility] 55+ Program  TRACK: IOP 5 (A2)    NUMBER OF PARTICIPANTS: 7    Summary of Group / Topics Discussed:  Behavioral Activation: The Change Process - Behavior Change: Patients explored the process and types of change, including but not limited to, theories of change, steps to making change, methods of changing behavior, and potential barriers.  Patients worked to identify what changes may benefit their daily lives, and work towards a plan to implement change.      Patient Session Goals / Objectives:  Demonstrate understanding of the change process.    Identify positive and negative behavioral patterns.  Make plans to track and implement changes and share experiences in group.  Identify personal barriers to change      Patient Participation / Response:  Fully participated with the group by sharing personal reflections / insights and openly received / provided feedback with other participants.    Demonstrated understanding of topics discussed through group discussion and participation and Expressed understanding of the relationship between behaviors, thoughts, and feelings    Treatment Plan:  Patient has a current master individualized treatment plan.  See Epic treatment plan for more information.    RAYMUNDO Headley

## 2023-11-01 NOTE — GROUP NOTE
Psychotherapy Group Note    PATIENT'S NAME: Angi Canales  MRN:   [id number]  :   1965  ACCT. NUMBER: [id number]  DATE OF SERVICE: 23  START TIME: 11:00 AM  END TIME: 11:50 AM  FACILITATOR: Liza Mai LICSW  TOPIC: MH EBP Group: Coping Skills  [facility] 55+ Program  TRACK: [facility] 5 (A2)    NUMBER OF PARTICIPANTS: 7    Summary of Group / Topics Discussed:  Coping Skills: Decision making and Relapse Planning: Patients discussed and increased understanding of how anticipating and planning for possible increased symptoms is a proactive way to reduce the likelihood of relapse.  Patients shared individualized factors that may lead to increased symptoms and decompensation in functioning.  Each patient developed a relapse prevention plan designed to help them recognize when they may have increasing symptoms requiring them to take action and notify their key supports and care team.      Patient Session Goals / Objectives:  Identify and understand what factors may contribute to symptom relapse.  Identify actions that may be taken to manage increased symptoms/stressors.  Create an individualized written relapse plan  Problem solve barriers to plan creation and implementation  Share relapse plan with key support people  Create coping cards for a mental health recovery tool      Patient Participation / Response:  Fully participated with the group by sharing personal reflections / insights and openly received / provided feedback with other participants.    Demonstrated understanding of topics discussed through group discussion and participation    Treatment Plan:  Patient has a current master individualized treatment plan.  See Epic treatment plan for more information.    RAYMUNDO Headley

## 2023-11-01 NOTE — GROUP NOTE
Process Group Note    PATIENT'S NAME: Angi Canales  MRN:   [id number]  :   1965  ACCT. NUMBER: [id number]  DATE OF SERVICE: 23  START TIME:  9:00 AM  END TIME:  9:50 AM  FACILITATOR: Liza Mai LICSW  TOPIC:  Process Group    Diagnoses:  296.23 (F32.2) Major Depressive Disorder, Single Episode, Severe _ and With melancholic features  300.02 (F41.1) Generalized Anxiety Disorder.  4. Other Diagnoses that is relevant to services:   300.01 (F41.0) Panic Disorder    [facility] 55+ Program  TRACK: IOP 5 (A2)    NUMBER OF PARTICIPANTS: 7        Data:    Session content: At the start of this group, patients were invited to check in by identifying themselves, describing their current emotional status, and identifying issues to address in this group.   Area(s) of treatment focus addressed in this session included Symptom Management, Personal Safety, Community Resources/Discharge Planning, Abstinence/Relapse Prevention, Develop / Improve Independent Living Skills, and Develop Socialization / Interpersonal Relationship Skills.    Angi reports sad, depressed and anxious mood with interrupted sleep pattern. Denies S/I or safety issues. Angi was up for most of the night with her dog who had physical pain. She brought her pet in the middle of the night to the [facility] ER. She is using coping skills for symptom management including meditation with her malas. She processed being more aware about her feelings and thoughts leading to be more vulnerable within the group. Angi accepted feedback from peers.      Therapeutic Interventions/Treatment Strategies:  Psychotherapist offered support, feedback and validation and reinforced use of skills. Treatment modalities used include Cognitive Behavioral Therapy.    Assessment:    Patient response:   Patient responded to session by accepting feedback, giving feedback, listening, focusing on goals, being attentive, accepting support, and verbalizing  understanding    Possible barriers to participation / learning include: and no barriers identified    Health Issues:   None reported. Reports medication compliance.       Substance Use Review:   Substance Use: No active concerns identified.    Mental Status/Behavioral Observations  Appearance:   Appropriate   Eye Contact:   Good   Psychomotor Behavior: Normal   Attitude:   Cooperative  Interested  Orientation:   All  Speech   Rate / Production: Normal    Volume:  Normal   Mood:    Anxious  Depressed   Affect:    Appropriate  Worrisome   Thought Content:   Clear and Safety denies any current safety concerns including suicidal ideation, self-harm, and homicidal ideation  Thought Form:  Coherent  Goal Directed  Logical     Insight:    Good     Plan:   Safety Plan: No current safety concerns identified.  Recommended that patient call 911 or go to the local ED should there be a change in any of these risk factors.   Barriers to treatment: None identified  Patient Contracts (see media tab):  None  Substance Use: Not addressed in session   Continue or Discharge: Patient will continue in 55+ Program (55+) as planned. Patient is likely to benefit from learning and using skills as they work toward the goals identified in their treatment plan.      Liza Mai, [facility]  November 1, 2023

## 2023-11-03 ENCOUNTER — HOSPITAL ENCOUNTER (OUTPATIENT)
Dept: BEHAVIORAL HEALTH | Facility: CLINIC | Age: 58
Discharge: HOME | End: 2023-11-03
Attending: PSYCHIATRY & NEUROLOGY
Payer: COMMERCIAL

## 2023-11-03 PROCEDURE — 90853 GROUP PSYCHOTHERAPY: CPT | Performed by: SOCIAL WORKER

## 2023-11-03 PROCEDURE — 90853 GROUP PSYCHOTHERAPY: CPT

## 2023-11-03 NOTE — GROUP NOTE
Psychotherapy Group Note    PATIENT'S NAME: Angi Canales  MRN:   [id number]  :   1965  ACCT. NUMBER: [id number]  DATE OF SERVICE: 23  START TIME: 10:00 AM  END TIME: 10:50 AM  FACILITATOR: Kelsey Sue LGSW  TOPIC: MH EBP Group: Behavioral Activation  [facility] Adult Mental Health Day Treatment  TRACK: IOP 5    NUMBER OF PARTICIPANTS: 7    Summary of Group / Topics Discussed:  Behavioral Activation: [place]ader Ahead: {Patients identified situations that prompt unwanted and unhelpful emotions / thoughts / behaviors.   Patients discussed how to problem solve by proactively using coping skills in potentially difficult situations. Components included describing the situation, brainstorming coping skills, imagining how scenario can/will unfold, rehearsing the action plan, and practicing relaxation to follow.  Patients practiced using these skills to reduce symptom distress and increase effective coping  behaviors.      Patient Session Goals / Objectives:  Identify difficult situation(s), and gain proficiency with alternative behaviors / skills to problem solve.  Increase confidence using coping skills through group practice in session.  Receive and provide feedback regarding skill development.  Apply coping skills in daily life situations.      Patient Participation / Response:  Fully participated with the group by sharing personal reflections / insights and openly received / provided feedback with other participants.    Demonstrated understanding of topics discussed through group discussion and participation, Expressed understanding of the relationship between behaviors, thoughts, and feelings, and Shared experiences and challenges with making behavioral changes    Treatment Plan:  Patient has a current master individualized treatment plan.  See Epic treatment plan for more information.    CIARA Baez

## 2023-11-03 NOTE — GROUP NOTE
Process Group Note    PATIENT'S NAME: Angi Canales  MRN:   [id number]  :   1965  ACCT. NUMBER: [id number]  DATE OF SERVICE: 23  START TIME:  9:00 AM  END TIME:  9:50 AM  FACILITATOR: Liza Mai LICSW  TOPIC:  Process Group    Diagnoses:  296.23 (F32.2) Major Depressive Disorder, Single Episode, Severe _ and With melancholic features  300.02 (F41.1) Generalized Anxiety Disorder.  4. Other Diagnoses that is relevant to services:   300.01 (F41.0) Panic Disorder    [facility] 55+ Program  TRACK: IOP 5 (A2)    NUMBER OF PARTICIPANTS: 7        Data:    Session content: At the start of this group, patients were invited to check in by identifying themselves, describing their current emotional status, and identifying issues to address in this group.   Area(s) of treatment focus addressed in this session included Symptom Management, Personal Safety, Community Resources/Discharge Planning, Abstinence/Relapse Prevention, Develop / Improve Independent Living Skills, and Develop Socialization / Interpersonal Relationship Skills.    Angi reports depressed and anxious mood with worry and tearfulness. She reported that her ex- contacted her which was emotionally triggering and brought up past issues. She is aware of coping skills for symptom management. Angi is engaged in feeling awareness and needs. Angi continues to meditate. She also continues to take care of pet dog who is adapting easier to the move to [place]. She has a weekend plan.      Therapeutic Interventions/Treatment Strategies:  Psychotherapist offered support, feedback and validation and reinforced use of skills. Treatment modalities used include Cognitive Behavioral Therapy.    Assessment:    Patient response:   Patient responded to session by accepting feedback, giving feedback, listening, focusing on goals, being attentive, and verbalizing understanding    Possible barriers to participation / learning include: and no  barriers identified    Health Issues:   None reported. Reports medication compliance.       Substance Use Review:   Substance Use: No active concerns identified.    Mental Status/Behavioral Observations  Appearance:   Appropriate   Eye Contact:   Good   Psychomotor Behavior: Normal   Attitude:   Cooperative  Interested  Orientation:   All  Speech   Rate / Production: Normal    Volume:  Normal   Mood:    Anxious  Depressed   Affect:    Appropriate  Tearful Worrisome   Thought Content:   Clear and Safety denies any current safety concerns including suicidal ideation, self-harm, and homicidal ideation  Thought Form:  Coherent  Goal Directed  Logical     Insight:    Good     Plan:   Safety Plan: No current safety concerns identified.  Recommended that patient call 911 or go to the local ED should there be a change in any of these risk factors.   Barriers to treatment: None identified  Patient Contracts (see media tab):  None  Substance Use: Not addressed in session   Continue or Discharge: Patient will continue in 55+ Program (55+) as planned. Patient is likely to benefit from learning and using skills as they work toward the goals identified in their treatment plan.      Liza Mai, [facility]  November 3, 2023

## 2023-11-03 NOTE — GROUP NOTE
Psychoeducation Group Note    PATIENT'S NAME: Angi Canales  MRN:   [id number]  :   1965  ACCT. NUMBER: [id number]  DATE OF SERVICE: 23  START TIME: 11:00 AM  END TIME: 11:50 AM  FACILITATOR: Liza Mai LICSW  TOPIC: MH Wellness Group: Health Maintenance  [facility] 55+ Program  TRACK: [facility] 5 (A2)      NUMBER OF PARTICIPANTS: 6    Summary of Group / Topics Discussed:  Health Maintenance: Weekend planning: Patients were given time to complete a weekend plan of what they will do to promote wellness and sobriety over the weekend when they do not have the structure of group. Patients were encouraged to review progress on their treatment goals and were challenged to identify ways to work toward meeting them. Patients identified and discussed foreseeable barriers to success over the weekend and then developed a plan to overcome them. Patients reviewed their distress coping skills and social support network and discussed this with the group.       Patient Session Goals / Objectives:    ?    Identified activities to engage in that promote balance in wellness  ?    Distinguished possible barriers to success over the weekend and created a plan to overcome them  ?    Listed distress coping skills and identified social support network to utilize if in crisis during the weekend        Patient Participation / Response:  Fully participated with the group by sharing personal reflections / insights and openly received / provided feedback with other participants.    Demonstrated understanding of topics discussed through group discussion and participation and Verbalized understanding of health maintenance topic    Treatment Plan:  Patient has a current master individualized treatment plan.  See Epic treatment plan for more information.    RAYMUNDO Headley

## 2023-11-06 ENCOUNTER — HOSPITAL ENCOUNTER (OUTPATIENT)
Dept: BEHAVIORAL HEALTH | Facility: CLINIC | Age: 58
Discharge: HOME | End: 2023-11-06
Attending: PSYCHIATRY & NEUROLOGY
Payer: COMMERCIAL

## 2023-11-06 PROCEDURE — 90853 GROUP PSYCHOTHERAPY: CPT

## 2023-11-06 PROCEDURE — 90853 GROUP PSYCHOTHERAPY: CPT | Performed by: SOCIAL WORKER

## 2023-11-06 NOTE — GROUP NOTE
Psychotherapy Group Note    PATIENT'S NAME: Angi Canales  MRN:   [id number]  :   1965  ACCT. NUMBER: [id number]  DATE OF SERVICE: 23  START TIME: 11:00 AM  END TIME: 11:50 AM  FACILITATOR: Liza Mai LICSW  TOPIC: MH EBP Group: Behavioral Activation  [facility] 55+ Program  TRACK: IOP 5    NUMBER OF PARTICIPANTS: 8    Summary of Group / Topics Discussed:  Behavioral Activation: Behavior Chain Analysis: Patients explored the steps leading up to identified unwanted behaviors, and ways to replace them with more effective behaviors. Patients examined factors contributing to unwanted behaviors, with a goal of determining ways to interrupt the unhealthy patterns.    Patient Session Goals / Objectives:  Identify thoughts, feelings, and actions that contribute to unwanted behaviors  Identify thoughts, feelings, and actions that contribute to more effective/healthy and desired behaviors  Make plans to track and implement changes and share experiences in group  Identify personal barriers to change      Patient Participation / Response:  Fully participated with the group by sharing personal reflections / insights and openly received / provided feedback with other participants.    Demonstrated understanding of topics discussed through group discussion and participation and Practiced skills in session    Treatment Plan:  Patient has a current master individualized treatment plan.  See Epic treatment plan for more information.    RAYMUNDO Headley

## 2023-11-06 NOTE — GROUP NOTE
Psychotherapy Group Note    PATIENT'S NAME: Angi Canales  MRN:   [id number]  :   1965  ACCT. NUMBER: [id number]  DATE OF SERVICE: 23  START TIME: 10:00 AM  END TIME: 10:50 AM  FACILITATOR: Kelsey Sue LGSW  TOPIC:  EBP Group: Emotions Management  [facility] 55+ Program  TRACK: IOP 5    NUMBER OF PARTICIPANTS: 7    Summary of Group / Topics Discussed:  Emotions Management: Opposite to Emotion: Patients discussed past and present struggles with knowing how to make changes in their lives due to difficult emotional experiences.  Explored desires to experience and feel less anger, sadness, guilt, and fear.  Reviewed the therapeutic skill of opposite action and patients explored opportunities to use their behaviors as a tool to reduce an emotion that they want to change.     Patient Session Goals / Objectives:  Review DBT concepts and focus on patient s experiences of distress and difficult emotional experiences.  Learn how to do the opposite of what an emotion makes us want to do in an effort to decrease an unwanted emotional experience.  Demonstrate understanding of the skill of opposite action by sharing experiences where the technique could be useful in past / present situations.      Patient Participation / Response:  Fully participated with the group by sharing personal reflections / insights and openly received / provided feedback with other participants.    Demonstrated understanding of topics discussed through group discussion and participation, Expressed understanding of the relevance / importance of emotions management skills at distressing times in life, and Self-aware of experiences with difficult emotions, and strategies to employ to manage them    Treatment Plan:  Patient has a current master individualized treatment plan.  See Epic treatment plan for more information.    CIARA Baez

## 2023-11-06 NOTE — GROUP NOTE
Process Group Note    PATIENT'S NAME: Angi Canales  MRN:   [id number]  :   1965  ACCT. NUMBER: [id number]  DATE OF SERVICE: 23  START TIME:  9:00 AM  END TIME:  9:50 AM  FACILITATOR: Liza Mai LICSW  TOPIC:  Process Group    Diagnoses:  296.23 (F32.2) Major Depressive Disorder, Single Episode, Severe _ and With melancholic features  300.02 (F41.1) Generalized Anxiety Disorder.  300.01 (F41.0) Panic Disorder    [facility] 55+ Program  TRACK: IOP 5    NUMBER OF PARTICIPANTS: 8        Data:    Session content: At the start of this group, patients were invited to check in by identifying themselves, describing their current emotional status, and identifying issues to address in this group.   Area(s) of treatment focus addressed in this session included Symptom Management, Personal Safety, Community Resources/Discharge Planning, Abstinence/Relapse Prevention, Develop / Improve Independent Living Skills, and Develop Socialization / Interpersonal Relationship Skills.    Angi reports less depressed mood today. Denies S/I or safety issues. Angi reports being aware of her pattern of mood symptoms as well as negative self talk. She reports using symptom management tools including cognitive reframing to stay positive. She processed how she was able to self disclose interpersonal relationship dynamics from her childhood related to alcoholism within her family system.  Angi continues to learn how to balance her routine.  She finds raul in spending time with her family and friends. She did engage socially with a girlfriend by going to a concert and able to enjoy it.         Therapeutic Interventions/Treatment Strategies:  Psychotherapist offered support, feedback and validation and reinforced use of skills. Treatment modalities used include Cognitive Behavioral Therapy.    Assessment:    Patient response:   Patient responded to session by accepting feedback, giving feedback, listening,  focusing on goals, being attentive, accepting support, and verbalizing understanding    Possible barriers to participation / learning include: and no barriers identified    Health Issues:   None reported. Reports medication compliance.       Substance Use Review:   Substance Use: No active concerns identified.    Mental Status/Behavioral Observations  Appearance:   Appropriate   Eye Contact:   Good   Psychomotor Behavior: Normal   Attitude:   Cooperative  Interested  Orientation:   All  Speech   Rate / Production: Normal    Volume:  Normal   Mood:    Less depressed mood, less anxious mood  Affect:    Appropriate   Thought Content:   Clear and Safety denies any current safety concerns including suicidal ideation, self-harm, and homicidal ideation  Thought Form:  Coherent  Goal Directed  Logical     Insight:    Good     Plan:   Safety Plan: No current safety concerns identified.  Recommended that patient call 911 or go to the local ED should there be a change in any of these risk factors.   Barriers to treatment: None identified  Patient Contracts (see media tab):  None  Substance Use: Not addressed in session   Continue or Discharge: Patient will continue in 55+ Program (55+) as planned. Patient is likely to benefit from learning and using skills as they work toward the goals identified in their treatment plan.      Liza Mai, [facility]  November 6, 2023

## 2023-11-07 ENCOUNTER — HOSPITAL ENCOUNTER (OUTPATIENT)
Dept: BEHAVIORAL HEALTH | Facility: CLINIC | Age: 58
Discharge: HOME | End: 2023-11-07
Attending: PSYCHIATRY & NEUROLOGY
Payer: COMMERCIAL

## 2023-11-07 PROCEDURE — 90853 GROUP PSYCHOTHERAPY: CPT | Performed by: SOCIAL WORKER

## 2023-11-07 PROCEDURE — 90853 GROUP PSYCHOTHERAPY: CPT

## 2023-11-07 NOTE — GROUP NOTE
Process Group Note    PATIENT'S NAME: Angi Canales  MRN:   [id number]  :   1965  ACCT. NUMBER: [id number]  DATE OF SERVICE: 23  START TIME:  9:00 AM  END TIME:  9:50 AM  FACILITATOR: Liza Mai LICSW  TOPIC:  Process Group    Diagnoses:  296.23 (F32.2) Major Depressive Disorder, Single Episode, Severe _ and With melancholic features  300.02 (F41.1) Generalized Anxiety Disorder.  300.01 (F41.0) Panic Disorder    [facility] 55+ Program  TRACK: IOP 5     NUMBER OF PARTICIPANTS: 9        Data:    Session content: At the start of this group, patients were invited to check in by identifying themselves, describing their current emotional status, and identifying issues to address in this group.   Area(s) of treatment focus addressed in this session included Symptom Management, Personal Safety, Community Resources/Discharge Planning, Abstinence/Relapse Prevention, Develop / Improve Independent Living Skills, and Develop Socialization / Interpersonal Relationship Skills.    Angi reports less depressed and less anxious mood. Denies S/I or safety issues. She processed her daughter being induced for birth delivery on Monday. This will be her first grand daughter.  She would like to support her daughter by being at the hospital so most likely will miss IOP that day. She continues to be insight oriented and aware of feelings and thoughts. She is using coping skills for symptom management. Her goal is to focus on self care. She has been enjoying [employer] on [employer]. She also has been painting.      Therapeutic Interventions/Treatment Strategies:  Psychotherapist offered support, feedback and validation and reinforced use of skills. Treatment modalities used include Cognitive Behavioral Therapy.    Assessment:    Patient response:   Patient responded to session by accepting feedback, giving feedback, listening, focusing on goals, being attentive, accepting support, and verbalizing  understanding    Possible barriers to participation / learning include: and no barriers identified    Health Issues:   None reported. Reports medication compliance.       Substance Use Review:   Substance Use: No active concerns identified.    Mental Status/Behavioral Observations  Appearance:   Appropriate   Eye Contact:   Good   Psychomotor Behavior: Normal   Attitude:   Cooperative  Interested  Orientation:   All  Speech   Rate / Production: Normal    Volume:  Normal   Mood:    Less depressed mood, less anxious mood  Affect:    Appropriate   Thought Content:   Clear and Safety denies any current safety concerns including suicidal ideation, self-harm, and homicidal ideation  Thought Form:  Coherent  Goal Directed  Logical     Insight:    Good     Plan:   Safety Plan: No current safety concerns identified.  Recommended that patient call 911 or go to the local ED should there be a change in any of these risk factors.   Barriers to treatment: None identified  Patient Contracts (see media tab):  None  Substance Use: Not addressed in session   Continue or Discharge: Patient will continue in 55+ Program (55+) as planned. Patient is likely to benefit from learning and using skills as they work toward the goals identified in their treatment plan.      Liza Mai, [facility]  November 7, 2023

## 2023-11-07 NOTE — GROUP NOTE
Psychotherapy Group Note    PATIENT'S NAME: Angi Canales  MRN:   [id number]  :   1965  ACCT. NUMBER: [id number]  DATE OF SERVICE: 23  START TIME: 11:00 AM  END TIME: 11:50 AM  FACILITATOR: Liza Mai LICSW  TOPIC: MH EBP Group: Coping Skills  [facility] 55+ Program  TRACK: IOP 5    NUMBER OF PARTICIPANTS: 9    Summary of Group / Topics Discussed:  Coping Skills: Improve the Moment: Patients learned to tolerate distress, by applying strategies to effect positive change in the present moment.  Patients will identified situations where they would benefit from applying strategies to improve the moment and reduce distress. Patients discussed how to distinguish when this can be useful in their lives or when other strategies would be more relevant or helpful.    Patient Session Goals / Objectives:  Discuss how the use of intentional  in the moment  actions can help reduce distress.  Review patients current practices and discuss a more formal way of practicing and accessing skills.  Increase ability to decide when to use improve the moment strategies  Choose 1-2 in the moment actions to apply during times of distress.      Patient Participation / Response:  Fully participated with the group by sharing personal reflections / insights and openly received / provided feedback with other participants.    Demonstrated understanding of topics discussed through group discussion and participation and Expressed understanding of the relevance / importance of coping skills at distressing times in life    Treatment Plan:  Patient has a current master individualized treatment plan.  See Epic treatment plan for more information.    RAYMUNDO Headley

## 2023-11-08 ENCOUNTER — HOSPITAL ENCOUNTER (OUTPATIENT)
Dept: BEHAVIORAL HEALTH | Facility: CLINIC | Age: 58
Discharge: HOME | End: 2023-11-08
Attending: PSYCHIATRY & NEUROLOGY
Payer: COMMERCIAL

## 2023-11-08 PROCEDURE — 90853 GROUP PSYCHOTHERAPY: CPT | Performed by: SOCIAL WORKER

## 2023-11-08 NOTE — GROUP NOTE
Psychotherapy Group Note    PATIENT'S NAME: Angi Canales  MRN:   [id number]  :   1965  ACCT. NUMBER: [id number]  DATE OF SERVICE: 23  START TIME: 10:00 AM  END TIME: 10:50 AM  FACILITATOR: Liza Mai LICSW  TOPIC: MH EBP Group: Behavioral Activation  [facility]  TRACK: IOP 5    NUMBER OF PARTICIPANTS: 7    Summary of Group / Topics Discussed:  Behavioral Activation: Procrastination: Patients explored how they currently spend their time, identifying thoughts and feelings that are motivating and serve to increase desired behaviors.  They also examined behaviors that contribute to procrastination.  Different types of procrastination behaviors were identified, and strategies to reduce individual procrastination and increase motivation were explored and practiced.  Patients identified ways to increase goal-directed activities to enhance mood and reduce symptoms.        Patient Session Goals / Objectives:  Identify current patterns of procrastination behavior and how they influence thoughts and moods, and inhibit motivation.  Identify behaviors that can be implemented that contribute to improving thoughts and feelings, motivation, and reduce symptoms.  Identify and develop a plan to increase activities that promote a sense of accomplishment and competence.  Practice scheduling positive activities / behaviors into daily routines.      Patient Participation / Response:  Fully participated with the group by sharing personal reflections / insights and openly received / provided feedback with other participants.    Demonstrated understanding of topics discussed through group discussion and participation and Shared experiences and challenges with making behavioral changes    Treatment Plan:  Patient has a current master individualized treatment plan.  See Epic treatment plan for more information.    RAYMUNDO Headley

## 2023-11-08 NOTE — GROUP NOTE
Psychotherapy Group Note    PATIENT'S NAME: Angi Canales  MRN:   [id number]  :   1965  ACCT. NUMBER: [id number]  DATE OF SERVICE: 23  START TIME: 10:00 AM  END TIME: 10:50 AM  FACILITATOR: Kelsey Sue LGSW  TOPIC:  EBP Group: Emotions Management   [facility] 55+ Program  TRACK: IOP 5    NUMBER OF PARTICIPANTS: 8    Summary of Group / Topics Discussed:  Emotions Management: Check Facts: Patients participated in an interactive approach to identifying and challenging cognitive distortions that arise following an event that triggers intense emotion.  Patients choose an emotional reaction/event to work on.  The group shared their experiences and thought processes for feedback.      Patient Session Goals / Objectives:  Learn the process of identifying thoughts associated with the situation and reaction  Learn to challenge cognitive distortions and reframe the situation/event/reaction   Distinguish between facts, feelings, thoughts  Gain understanding of how to interpret an emotional reaction  Practice identification of cognitive distortions and evaluating an emotionally charged situation more rationally/objectively/mindfully      Patient Participation / Response:  Fully participated with the group by sharing personal reflections / insights and openly received / provided feedback with other participants.    Demonstrated understanding of topics discussed through group discussion and participation, Expressed understanding of the relevance / importance of emotions management skills at distressing times in life, and Self-aware of experiences with difficult emotions, and strategies to employ to manage them    Treatment Plan:  Patient has a current master individualized treatment plan.  See Epic treatment plan for more information.    CIARA Baez

## 2023-11-08 NOTE — GROUP NOTE
Psychotherapy Group Note    PATIENT'S NAME: Angi Canales  MRN:   [id number]  :   1965  ACCT. NUMBER: [id number]  DATE OF SERVICE: 23  START TIME: 11:00 AM  END TIME: 11:50 AM  FACILITATOR: Liza Mai LICSW  TOPIC: MH EBP Group: Behavioral Activation  [facility]  TRACK: IOP 5    NUMBER OF PARTICIPANTS: 7    Summary of Group / Topics Discussed:  Behavioral Activation: Motivation: Patients explored how they currently spend their time, identifying thoughts and feelings that are motivating and serve to increase desired behaviors.  They also examined behaviors that contribute to procrastination.  Strategies to  increase motivation were explored and practiced.  Patients identified ways to increase goal-directed activities to enhance mood and reduce symptoms.        Patient Session Goals / Objectives:  Identify behaviors that can be implemented that contribute to improving thoughts and feelings, motivation, and reduce symptoms.  Identify and develop a plan to increase activities that promote a sense of accomplishment and competence.  Practice scheduling positive activities / behaviors into daily routines.      Patient Participation / Response:  Fully participated with the group by sharing personal reflections / insights and openly received / provided feedback with other participants.    Demonstrated understanding of topics discussed through group discussion and participation and Practiced skills in session    Treatment Plan:  Patient has a current master individualized treatment plan.  See Epic treatment plan for more information.    RAYMUNDO Headley

## 2023-11-08 NOTE — GROUP NOTE
Process Group Note    PATIENT'S NAME: Angi Canales  MRN:   [id number]  :   1965  [id number]  DATE OF SERVICE: 23  START TIME:  9:00 AM  END TIME:  9:50 AM  FACILITATOR: Liza Mai LICSW  TOPIC:  Process Group    Diagnoses:  296.23 (F32.2) Major Depressive Disorder, Single Episode, Severe _ and With melancholic features  300.02 (F41.1) Generalized Anxiety Disorder.  300.01 (F41.0) Panic Disorder    [facility] 55+ Program  TRACK:     NUMBER OF PARTICIPANTS: 6        Data:    Session content: At the start of this group, patients were invited to check in by identifying themselves, describing their current emotional status, and identifying issues to address in this group.   Area(s) of treatment focus addressed in this session included Symptom Management, Personal Safety, Community Resources/Discharge Planning, Abstinence/Relapse Prevention, Develop / Improve Independent Living Skills, and Develop Socialization / Interpersonal Relationship Skills.    Angi reports less depressed and less anxious mood. Denies S/I or safety issues.  She continues to be insight oriented and aware of feelings and thoughts. She is using coping skills for symptom management. Her goal is to focus on self care as well as task management.  She can prioritize by making lists which is helpful. Her dog has now comfortable at home so she has less worry about her pet barking. She has established a routine.        Therapeutic Interventions/Treatment Strategies:  Psychotherapist offered support, feedback and validation and reinforced use of skills. Treatment modalities used include Cognitive Behavioral Therapy.    Assessment:    Patient response:   Patient responded to session by accepting feedback, giving feedback, listening, focusing on goals, being attentive, and verbalizing understanding    Possible barriers to participation / learning include: and no barriers identified    Health Issues:   None  reported. Reports medication compliance.       Substance Use Review:   Substance Use: No active concerns identified.    Mental Status/Behavioral Observations  Appearance:   Appropriate   Eye Contact:   Good   Psychomotor Behavior: Normal   Attitude:   Cooperative  Interested  Orientation:   All  Speech   Rate / Production: Normal    Volume:  Normal   Mood:    Less depressed and less anxious mood  Affect:    Appropriate   Thought Content:   Clear and Safety denies any current safety concerns including suicidal ideation, self-harm, and homicidal ideation  Thought Form:  Coherent  Goal Directed  Logical     Insight:    Good     Plan:   Safety Plan: No current safety concerns identified.  Recommended that patient call 911 or go to the local ED should there be a change in any of these risk factors.   Barriers to treatment: None identified  Patient Contracts (see media tab):  None  Substance Use: Not addressed in session   Continue or Discharge: Patient will continue in 55+ Program (55+) as planned. Patient is likely to benefit from learning and using skills as they work toward the goals identified in their treatment plan.      Liza Mai, [facility]  November 8, 2023

## 2023-11-10 ENCOUNTER — HOSPITAL ENCOUNTER (OUTPATIENT)
Dept: BEHAVIORAL HEALTH | Facility: CLINIC | Age: 58
Discharge: HOME | End: 2023-11-10
Attending: PSYCHIATRY & NEUROLOGY
Payer: COMMERCIAL

## 2023-11-10 PROCEDURE — 90853 GROUP PSYCHOTHERAPY: CPT

## 2023-11-10 PROCEDURE — 90853 GROUP PSYCHOTHERAPY: CPT | Performed by: SOCIAL WORKER

## 2023-11-10 NOTE — GROUP NOTE
Psychotherapy Group Note    PATIENT'S NAME: Angi Canales  MRN:   [id number]  :   1965  ACCT. NUMBER: [id number]  DATE OF SERVICE: 11/10/23  START TIME: 10:00 AM  END TIME: 10:50 AM  FACILITATOR: Kelsey Sue LGSW  TOPIC: MH EBP Group: Coping Skills  [facility] Adult Mental Health Outpatient Programs  TRACK: IOP 5    NUMBER OF PARTICIPANTS: 7    Summary of Group / Topics Discussed:  Coping Skills: Calming Strategies: Patients discussed and practiced strategies to increase sense of calm in the body.  Reviewed the benefits of calming strategies as well as past / current practices of each member.  Patients identified situations in which using these strategies would reduce stress. They developed the ability to distinguish when these strategies can be useful in their lives for management and stress and psychological well-being.    Patient Session Goals / Objectives:  Understand the purpose of using calming strategies to reduce stress  Review patients current calming practices and discuss a more formal way of practicing and accessing skills.  Increase ability to decide when to use various calming strategies (e.g. Progressive muscle relaxation, deep breathing, guided imagery, + thoughts).  Choose 1-2 calming strategies to apply during times of distress.      Patient Participation / Response:  Fully participated with the group by sharing personal reflections / insights and openly received / provided feedback with other participants.    Demonstrated understanding of topics discussed through group discussion and participation, Expressed understanding of the relevance / importance of coping skills at distressing times in life, and Demonstrated knowledge of when to consider using a variety of coping skills in daily life    Treatment Plan:  Patient has a current master individualized treatment plan.  See Epic treatment plan for more information.    CIARA Baez

## 2023-11-10 NOTE — GROUP NOTE
Process Group Note    PATIENT'S NAME: Angi Canales  MRN:   [id number]  :   1965  ACCT. NUMBER: [id number]  DATE OF SERVICE: 11/10/23  START TIME:  9:00 AM  END TIME:  9:50 AM  FACILITATOR: Liza Mai LICSW  TOPIC:  Process Group    Diagnoses:  296.23 (F32.2) Major Depressive Disorder, Single Episode, Severe _ and With melancholic features  300.02 (F41.1) Generalized Anxiety Disorder.  300.01 (F41.0) Panic Disorder    [facility] Mental Health Outpatient Programs  TRACK: IOP 5    NUMBER OF PARTICIPANTS: 7        Data:    Session content: At the start of this group, patients were invited to check in by identifying themselves, describing their current emotional status, and identifying issues to address in this group.   Area(s) of treatment focus addressed in this session included Symptom Management, Personal Safety, Community Resources/Discharge Planning, Abstinence/Relapse Prevention, Develop / Improve Independent Living Skills, and Develop Socialization / Interpersonal Relationship Skills.    Angi reports depressed and anxious mood with worry. Denies S/I or safety issues. She reports preparing to host a game night at her plan [employer]. She made a white bean chili and will offer beverages. Writer encouraged her to consult with outpatient psychiatrist about alcohol use since she is taking medication. Angi will have her daughter attend the event.  Angi provided meaningful feedback about the transition to and through her divorce. She continues to heal. She is looking forward to her daughter having her baby on Monday. It will be her first grand daughter and 4th grandbaby.  Angi continues to use coping skills for symptom management including breathe. She will have an outpatient therapist appointment today and a psychiatrist appointment next Thursday.      Therapeutic Interventions/Treatment Strategies:  Psychotherapist offered support, feedback and validation and reinforced use of  skills. Treatment modalities used include Cognitive Behavioral Therapy.    Assessment:    Patient response:   Patient responded to session by accepting feedback, giving feedback, listening, focusing on goals, being attentive, accepting support, and verbalizing understanding    Possible barriers to participation / learning include: and no barriers identified    Health Issues:   Yes: Sleep disturbance, Associated Psychological Distress    Reports medication compliance.       Substance Use Review:   Substance Use: No active concerns identified.    Mental Status/Behavioral Observations  Appearance:   Appropriate   Eye Contact:   Good   Psychomotor Behavior: Normal   Attitude:   Cooperative  Interested  Orientation:   All  Speech   Rate / Production: Normal    Volume:  Normal   Mood:    Anxious  Depressed   Affect:    Appropriate  Worrisome   Thought Content:   Clear and Safety denies any current safety concerns including suicidal ideation, self-harm, and homicidal ideation  Thought Form:  Coherent  Goal Directed  Logical     Insight:    Good     Plan:   Safety Plan: No current safety concerns identified.  Recommended that patient call 911 or go to the local ED should there be a change in any of these risk factors.   Barriers to treatment: None identified  Patient Contracts (see media tab):  None  Substance Use: Not addressed in session   Continue or Discharge: Patient will continue in 55+ Program (55+) as planned. Patient is likely to benefit from learning and using skills as they work toward the goals identified in their treatment plan.      Liza Mai, [facility]  November 10, 2023

## 2023-11-10 NOTE — GROUP NOTE
Psychoeducation Group Note    PATIENT'S NAME: Angi Canales  MRN:   [id number]  :   1965  ACCT. NUMBER: [id number]  DATE OF SERVICE: 11/10/23  START TIME: 11:00 AM  END TIME: 11:50 AM  FACILITATOR: Liza Mai LICSW  TOPIC: MH Wellness Group: [facility] Maintenance  [facility] Adult Mental Health Outpatient Programs  TRACK: IOP 5    NUMBER OF PARTICIPANTS: 7    Summary of Group / Topics Discussed:  Health Maintenance: Weekend planning: Patients were given time to complete a weekend plan of what they will do to promote wellness and sobriety over the weekend when they do not have the structure of group. Patients were encouraged to review progress on their treatment goals and were challenged to identify ways to work toward meeting them. Patients identified and discussed foreseeable barriers to success over the weekend and then developed a plan to overcome them. Patients reviewed their distress coping skills and social support network and discussed this with the group.       Patient Session Goals / Objectives:    ?    Identified activities to engage in that promote balance in wellness  ?    Distinguished possible barriers to success over the weekend and created a plan to overcome them  ?    Listed distress coping skills and identified social support network to utilize if in crisis during the weekend        Patient Participation / Response:  Fully participated with the group by sharing personal reflections / insights and openly received / provided feedback with other participants.    Demonstrated understanding of topics discussed through group discussion and participation and Verbalized understanding of health maintenance topic    Treatment Plan:  Patient has a current master individualized treatment plan.  See Epic treatment plan for more information.    RAYMUNDO Headley

## 2023-11-13 ENCOUNTER — TELEPHONE (OUTPATIENT)
Dept: BEHAVIORAL HEALTH | Facility: CLINIC | Age: 58
End: 2023-11-13
Payer: COMMERCIAL

## 2023-11-14 ENCOUNTER — TELEPHONE (OUTPATIENT)
Dept: BEHAVIORAL HEALTH | Facility: CLINIC | Age: 58
End: 2023-11-14
Payer: COMMERCIAL

## 2023-11-15 ENCOUNTER — HOSPITAL ENCOUNTER (OUTPATIENT)
Dept: BEHAVIORAL HEALTH | Facility: CLINIC | Age: 58
Discharge: HOME | End: 2023-11-15
Attending: PSYCHIATRY & NEUROLOGY
Payer: COMMERCIAL

## 2023-11-15 PROCEDURE — 90853 GROUP PSYCHOTHERAPY: CPT

## 2023-11-15 PROCEDURE — 90853 GROUP PSYCHOTHERAPY: CPT | Performed by: SOCIAL WORKER

## 2023-11-15 NOTE — GROUP NOTE
Psychotherapy Group Note    PATIENT'S NAME: Angi Canales  MRN:   [id number]  :   1965  ACCT. NUMBER: [id number]  DATE OF SERVICE: 11/15/23  START TIME: 11:00 AM  END TIME: 11:50 AM  FACILITATOR: Liza Mai LICSW  TOPIC: MH EBP Group: Coping Skills  [facility] Adult Mental Health Outpatient Programs  TRACK: IOP 5    NUMBER OF PARTICIPANTS: 6    Summary of Group / Topics Discussed:  Coping Skills: Improve the Moment: Patients learned to tolerate distress, by applying strategies to effect positive change in the present moment.  Patients will identified situations where they would benefit from applying strategies to improve the moment and reduce distress. Patients discussed how to distinguish when this can be useful in their lives or when other strategies would be more relevant or helpful.    Patient Session Goals / Objectives:  Discuss how the use of intentional  in the moment  actions can help reduce distress.  Review patients current practices and discuss a more formal way of practicing and accessing skills.  Increase ability to decide when to use improve the moment strategies  Choose 1-2 in the moment actions to apply during times of distress.      Patient Participation / Response:  Fully participated with the group by sharing personal reflections / insights and openly received / provided feedback with other participants.    Demonstrated understanding of topics discussed through group discussion and participation and Identified 2-3 positive coping strategies that have helped maintain / improve symptoms in the past    Treatment Plan:  Patient has a current master individualized treatment plan.  See Epic treatment plan for more information.    RAYMUNDO Headley

## 2023-11-15 NOTE — GROUP NOTE
Process Group Note    PATIENT'S NAME: Angi Canales  MRN:   [id number]  :   1965  ACCT. NUMBER: [id number]  DATE OF SERVICE: 11/15/23  START TIME:  9:00 AM  END TIME:  9:50 AM  FACILITATOR: Liza Mai LICSW  TOPIC:  Process Group    Diagnoses:  296.23 (F32.2) Major Depressive Disorder, Single Episode, Severe _ and With melancholic features  300.02 (F41.1) Generalized Anxiety Disorder.  300.01 (F41.0) Panic Disorder    [facility] Mental Health Outpatient Programs  TRACK: IOP 5    NUMBER OF PARTICIPANTS: 6        Data:    Session content: At the start of this group, patients were invited to check in by identifying themselves, describing their current emotional status, and identifying issues to address in this group.   Area(s) of treatment focus addressed in this session included Symptom Management, Personal Safety, Community Resources/Discharge Planning, Abstinence/Relapse Prevention, Develop / Improve Independent Living Skills, and Develop Socialization / Interpersonal Relationship Skills.    Angi reports anxious and depressed mood. Denies S/I or safety issues. She processed the interpersonal relationship dynamics with her adult daughter. She would like to strengthen this relationship. Angi has been in the role of support while her adult daughter had her baby. She will be discharged from the hospital today. Angi states that Game Night she planned last Friday went well. Angi is focused on her routine and building self awareness.      Therapeutic Interventions/Treatment Strategies:  Psychotherapist offered support, feedback and validation and reinforced use of skills. Treatment modalities used include Cognitive Behavioral Therapy.    Assessment:    Patient response:   Patient responded to session by accepting feedback, giving feedback, listening, focusing on goals, being attentive, and verbalizing understanding    Possible barriers to participation / learning include: and no  barriers identified    Health Issues:   None reported. Reports medication compliance.        Substance Use Review:   Substance Use: No active concerns identified.    Mental Status/Behavioral Observations  Appearance:   Appropriate   Eye Contact:   Good   Psychomotor Behavior: Normal   Attitude:   Cooperative  Interested  Orientation:   All  Speech   Rate / Production: Normal    Volume:  Normal   Mood:    Anxious  Depressed   Affect:    Appropriate   Thought Content:   Clear and Safety denies any current safety concerns including suicidal ideation, self-harm, and homicidal ideation  Thought Form:  Coherent  Goal Directed  Logical     Insight:    Good     Plan:   Safety Plan: No current safety concerns identified.  Recommended that patient call 911 or go to the local ED should there be a change in any of these risk factors.   Barriers to treatment: None identified  Patient Contracts (see media tab):  None  Substance Use: Not addressed in session   Continue or Discharge: Patient will continue in 55+ Program (55+) as planned. Patient is likely to benefit from learning and using skills as they work toward the goals identified in their treatment plan.      Liza Mai, [facility]  November 15, 2023

## 2023-11-15 NOTE — GROUP NOTE
Psychotherapy Group Note    PATIENT'S NAME: Angi Canales  MRN:   [id number]  :   1965  ACCT. NUMBER: [id number]  DATE OF SERVICE: 11/15/23  START TIME: 10:00 AM  END TIME: 10:50 AM  FACILITATOR: Kelsey Sue LGSW  TOPIC: MH EBP Group: Specialty Awareness  [facility] Adult Mental Health Outpatient Programs  TRACK: IOP 5    NUMBER OF PARTICIPANTS: 6    Summary of Group / Topics Discussed:  Specialty Topics: Nature  Nature as a healing space.  Discussion os research on nature and mental health.      Patient Participation / Response:  Fully participated with the group by sharing personal reflections / insights and openly received / provided feedback with other participants.    Demonstrated understanding of topics discussed through group discussion and participation, Identified / Expressed readiness to act on skill suggestions discussed in topic, and Verbalized understanding of ways to proactively manage illness    Treatment Plan:  Patient has a current master individualized treatment plan.  See Epic treatment plan for more information.    CIARA Baez

## 2023-11-17 ENCOUNTER — HOSPITAL ENCOUNTER (OUTPATIENT)
Dept: BEHAVIORAL HEALTH | Facility: CLINIC | Age: 58
Discharge: HOME | End: 2023-11-17
Attending: PSYCHIATRY & NEUROLOGY
Payer: COMMERCIAL

## 2023-11-17 DIAGNOSIS — F32.2 MDD (MAJOR DEPRESSIVE DISORDER), SINGLE EPISODE, SEVERE (H): Primary | ICD-10-CM

## 2023-11-17 DIAGNOSIS — F41.9 ANXIETY: ICD-10-CM

## 2023-11-17 PROCEDURE — 90853 GROUP PSYCHOTHERAPY: CPT | Performed by: SOCIAL WORKER

## 2023-11-17 PROCEDURE — 99214 OFFICE O/P EST MOD 30 MIN: CPT

## 2023-11-17 PROCEDURE — 90853 GROUP PSYCHOTHERAPY: CPT

## 2023-11-17 NOTE — PROGRESS NOTES
"[facility]   Adult Mental Health Outpatient Programs  Provider Interval History Note    Program: Intensive Outpatient Program, track 5     PATIENT'S NAME: Angi Canales  MRN:   [id number]  :   1965  ACCT. NUMBER: [id number]  DATE OF SERVICE: 23    Interval History:  \"Much better.\" Angi presents today for follow-up and ongoing program supervision.   Endorses:  I am feeling much better. The medications helps  I noticed very quick the improvement after the change sin meds  I met with outpatient who increased Effexor because I was still experiencing anxiety  I guess without medication it would much harder  I am getting a lot out of therapy. Th is to implement the tools  Being here everyday helps.  I feel stressed still with my job and I worried going back. I am contemplating finding something else.   I feel more in control that makes me feel prepared to deal with that. I am worried about the income  Medication compliant and no reported side effects    Symptoms/Systems:  Sleep: I sleep a lot. I go to bed form 7 to 7:30 am. I feel rested in the morning. On the weekend I sleep about 13 hours.   Appetite: it is at baseline. I need to make more healthier diet  Daily function/ADLs: Working on that  Hygiene: On noted concerns  Socialization: Yes  Concerns about work or ability to work: JOSH    Substance use:  I drink very little, maybe once a week, 3-4 beers    Reactions/thoughts about program:  Helpful    Safety Assessment:  Suicidal ideation: denies current or recent suicidal ideation or behavior  Thoughts of non-suicidal self-injury: denied  Recent self-injurious behavior: denied  Homicidal ideation: denied  Other safety concerns: denied    Medications:  Current Outpatient Medications   Medication Sig Dispense Refill    buPROPion (WELLBUTRIN XL) 150 MG 24 hr tablet Take 150 mg by mouth every morning      citalopram (CELEXA) 20 MG tablet Take 1 tablet (20 mg) by mouth " daily (Patient taking differently: Take 40 mg by mouth daily) 90 tablet 0    LORazepam (ATIVAN) 0.5 MG tablet       venlafaxine (EFFEXOR XR) 37.5 MG 24 hr capsule          The above list was reviewed and updated in EPIC with patient today.     Patient is taking medications as prescribed and denies adverse effects    Laboratory Results:  Most recent labs reviewed. Pertinent updates/findings: None.     Metrics:  PHQ-9 scores:       10/30/2012     8:00 AM 10/9/2023     1:26 PM 10/16/2023     9:38 AM 10/19/2023     7:43 AM   PHQ-9 SCORE   PHQ-9 Total Score 9      PHQ-9 Total Score MyChart  22 (Severe depression)  22 (Severe depression)   PHQ-9 Total Score  22 23 22       LUCIAN-7 scores:       10/30/2012    10:50 AM 10/8/2023     5:25 PM 10/16/2023     9:38 AM   LUCIAN-7 SCORE   Total Score 9     Total Score  19 (severe anxiety)    Total Score  19 21       CSSR-S: [place] Suicide Severity Rating Scale (Short Version)      10/3/2023    10:18 AM 10/3/2023     6:39 PM 10/10/2023     8:00 AM   [place] Suicide Severity Rating (Short Version)   Over the past 2 weeks have you felt down, depressed, or hopeless? yes     Over the past 2 weeks have you had thoughts of killing yourself? no     Have you ever attempted to kill yourself? no     Q1 Wished to be Dead (Past Month)  no no   Q2 Suicidal Thoughts (Past Month)  no no   Q3 Suicidal Thought Method  no no   Q4 Suicidal Intent without Specific Plan  no no   Q5 Suicide Intent with Specific Plan  no no   Q6 Suicide Behavior (Lifetime)   no   Level of Risk per Screen  low risk low risk   Interventions Monitored via video           Mental Status Examination:  Vital Signs: There were no vitals taken for this visit.   Appearance: adequately groomed, appears stated age, and in no apparent distress.  Attitude: cooperative   Eye Contact: good   Muscle Strength and Tone: no gross abnormalities   Psychomotor Behavior: normal or unremarkable   Gait and Station: normal width, turn, arm  "swing  Speech: normal rate, production, volume, and rhythm of  Associations: No loosening of associations  Thought Process: coherent and goal directed  Thought Content: no evidence of suicidal ideation or homicidal ideation, no evidence of psychotic thought, no auditory hallucinations present, and no visual hallucinations present  Mood: \"anxious\"  Affect: appropriate and in normal range  Insight: good  Judgment: intact, adequate for safety  Impulse Control: intact  Oriented to: time, place, person, and situation  Attention Span and Concentration: normal  Language: Intact  Recent and Remote Memory: intact to interview. Not formally assessed. No amnesia.  Fund of Knowledge/Assessment of Intelligence: Average  Capacity of Activities of Daily Living: Independent, able to participate in programmatic care services.    Diagnosis/es:    ICD-10-CM    1. MDD (major depressive disorder), single episode, severe (H)  F32.2       2. Anxiety  F41.9           Assessment/Plan:  Angi presents today for follow up. Endorses feeling much better and the medications are helpful.  Still feeling stressed at the prospect of returning to a toxic work environment.  Brainstorming her options between returning to her old job or looking for something else.  Compliant with medication no reported side effects.  Patient will continue with psychotherapy to consolidate current gains and prevent possible relapse in mental health symptoms.  Follow-up in 4 weeks or sooner as needed.    296.23 (F32.2) Major Depressive Disorder, Single Episode, Severe and With melancholic features    Overall improved   Continue to engage in psychotherapy  Continue working with outpatient provider for medication management  Continue with current medication regimen  Titrating to stop Escitalopram  Started Wellbutrin 150 mg daily  Improve sleep hygiene  Maintain a balanced diet  Engage in physical activities as tolerated    300.02 (F41.1) Generalized Anxiety " Disorder  Overall improved  3.As noted above   Continue all other medications as reviewed per electronic medical record today    Continue therapy as planned:  Enrolled in Intensive Outpatient Program, track 5   Continues to benefit from services  Continues to meet criteria for this level of care  Patient would be at reasonable risk of requiring a higher level of care in the absence of current services.  I feel this patient does not meet criteria for an involuntary hold and is appropriate for treatment at an outpatient level of care.  Continue with individual therapist as appropriate    Safety plan reviewed:  To the Emergency Department as needed or call after hours crisis line at [phone] or [phone]. [place] Crisis Text Line: Text [place] to 111171 or Suicide LifeLine Chat: suicidepreF[employer].org/chat    Follow-up:   schedule an appointment with me or another program provider in approximately in 4 week(s) or sooner if needed.  Can speak with a staff member or call the appropriate program number (see below) to schedule  Follow up with outpatient provider(s) as planned or sooner if needed for acute medical concerns.    Questions or concerns:  Call program line with questions or concerns (see below)  Countercepts may be used to communicate with your provider, but this is not intended to be used for emergencies.    [facility] Adult Mental Health Program lines:  [facility]: [phone]  Dual Disorder: [phone]  Adult Day Treatment:  [phone]  55+/Intensive Outpatient: [phone]    Community Resources:    National Suicide Prevention Lifeline: 988 from any phone, or 594-816-6939 (TTY: 985.749.7506). Call anytime for help.  (www.suicidepreventionlifeline.org)  National [place] on Mental Illness (www.buck.org): 750.374.5000 or 354-200-5395.   Mental Health Association (www.mentalhealth.org): 333.379.2402 or 391-607-5656.  [place] Crisis Text Line: Text [place] to 749415  Suicide LifeLine Chat:  suicidepreventionlifeline.org/chat    Treatment Objective(s) Addressed in This Session:  The purpose of today's virtual visit is for this writer to provide oversight of patient's care while receiving program services. Specific treatment goals addressed included personal safety, symptoms stabilization and management, wellness and mental health, and community resources/discharge planning.     This author or another program provider will follow up with the patient as noted above.     Patient agrees with the current plan of care.    YRIS FULTON CNP  11/17/23      Visit Details:  Type of service: In-person    Location (patient and provider): [facility] Adult Mental Health Outpatient Programmatic Care Offices    Level of Medical Decision Making:   - At least 2 stable chronic problems  - Engaged in prescription drug management during visit (discussed any medication benefits, side effects, alternatives, etc.)         30 min spent on the date of the encounter in chart review, patient visit, review of tests, documentation, care coordination, and/or discussion with other providers about the issues documented above.      This document completed in part using Dragon [employer] One dictation software.  Please excuse any inadvertent word or phrase substitutions.

## 2023-11-17 NOTE — GROUP NOTE
Psychotherapy Group Note    PATIENT'S NAME: Angi Canales  MRN:   [id number]  :   1965  ACCT. NUMBER: [id number]  DATE OF SERVICE: 23  START TIME: 10:00 AM  END TIME: 10:50 AM  FACILITATOR: Liza Mai LICSW  TOPIC: MH EBP Group: Zohra PERSAUD [facility] Outpatient [facility]  TRACK: IOP 5    NUMBER OF PARTICIPANTS: 8    Summary of Group / Topics Discussed:  Coping Skills: Discharge and Relapse Planning: Patients discussed and increased understanding of how anticipating and planning for possible increased symptoms is a proactive way to reduce the likelihood of relapse.  Patients shared individualized factors that may lead to increased symptoms and decompensation in functioning.  Each patient developed a discharge  plan with community resources.     Patient Session Goals / Objectives:  Identify and understand what factors may contribute to symptom relapse (Lapse, Relapse and Collapse).  Create an individualized written relapse plan  Problem solve barriers to plan creation and implementation  Share relapse plan with key support people      Patient Participation / Response:  Fully participated with the group by sharing personal reflections / insights and openly received / provided feedback with other participants.    Demonstrated understanding of topics discussed through group discussion and participation    Treatment Plan:  Patient has a current master individualized treatment plan.  See Epic treatment plan for more information.    RAYMUNDO Headley

## 2023-11-17 NOTE — GROUP NOTE
"Process Group Note    PATIENT'S NAME: Angi Canales  MRN:   [id number]  :   1965  ACCT. NUMBER: [id number]  DATE OF SERVICE: 23  START TIME:  9:00 AM  END TIME:  9:50 AM  FACILITATOR: Liza Mai LICSW  TOPIC:  Process Group    Diagnoses:  296.23 (F32.2) Major Depressive Disorder, Single Episode, Severe _ and With melancholic features  300.02 (F41.1) Generalized Anxiety Disorder.  300.01 (F41.0) Panic Disorder    [facility] Mental [facility] Outpatient Programs  TRACK: IOP 5    NUMBER OF PARTICIPANTS: 8        Data:    Session content: At the start of this group, patients were invited to check in by identifying themselves, describing their current emotional status, and identifying issues to address in this group.   Area(s) of treatment focus addressed in this session included Symptom Management, Personal Safety, Community Resources/Discharge Planning, Abstinence/Relapse Prevention, Develop / Improve Independent Living Skills, and Develop Socialization / Interpersonal Relationship Skills.    Angi states \"I feel good today.\" She reports less depressed and less anxious mood. Denies S/I or safety issues. She continues to use coping skills for symptom management including being aware of the pattern of mood symptoms and needs. She was able to consult with her psychiatrist for medication management yesterday. She continues to practice self care. She is looking forward to spending time with her grand daughter over the weekend.     Angi and writer met to review and update her treatment plan goals.       Therapeutic Interventions/Treatment Strategies:  Psychotherapist offered support, feedback and validation and reinforced use of skills. Treatment modalities used include Cognitive Behavioral Therapy.    Assessment:    Patient response:   Patient responded to session by accepting feedback, giving feedback, listening, focusing on goals, being attentive, accepting support, and verbalizing " understanding    Possible barriers to participation / learning include: and no barriers identified    Health Issues:   None reported. Reports medication compliance.       Substance Use Review:   Substance Use: No active concerns identified.    Mental Status/Behavioral Observations  Appearance:   Appropriate   Eye Contact:   Good   Psychomotor Behavior: Normal   Attitude:   Cooperative  Interested  Orientation:   All  Speech   Rate / Production: Normal    Volume:  Normal   Mood:    Less depressed and less anxious mood  today  Affect:    Appropriate   Thought Content:   Clear and Safety denies any current safety concerns including suicidal ideation, self-harm, and homicidal ideation  Thought Form:  Coherent  Goal Directed  Logical     Insight:    Good     Plan:   Safety Plan: No current safety concerns identified.  Recommended that patient call 911 or go to the local ED should there be a change in any of these risk factors.   Barriers to treatment: None identified  Patient Contracts (see media tab):  None  Substance Use: Not addressed in session   Continue or Discharge: Patient will continue in 55+ Program (55+) as planned. Patient is likely to benefit from learning and using skills as they work toward the goals identified in their treatment plan.      Liza Mai, [facility]  November 17, 2023

## 2023-11-17 NOTE — GROUP NOTE
Psychotherapy Group Note    PATIENT'S NAME: Angi Canales  MRN:   [id number]  :   1965  ACCT. NUMBER: [id number]  DATE OF SERVICE: 23  START TIME: 11:00 AM  END TIME: 11:50 AM  FACILITATOR: Diana Sykes LGSW  TOPIC: MH EBP Group: Social Support  [facility] Mental Health Outpatient Programs  TRACK: 55+ IOP 5    NUMBER OF PARTICIPANTS: 8    Summary of Group / Topics Discussed:  Asking for & Accepting Help: Facilitated discussion on the importance of asking for and accepting help from others in their daily lives. Patients discussed potential barriers and benefits of seeking support. Reflected on a time they successfully asked for and received support, and why further developing this skill may be beneficial for mental wellbeing. Identified areas that they currently need support in, and were provided an opportunity to plan and problem solve how they will go about asking for and accepting help in those areas. Patients discussed with peers and staff regarding their progress, and ways they will initiate asking for help in the future. Validation, support, and feedback was provided throughout the group process.      Patient Session Goals / Objectives:   Identified potential barriers and benefits of asking for and accepting help   Improved awareness of important aspects of support for mental wellbeing    Reflected on a current area of life in which they need support   Practiced the skill of developing a plan for how to ask for and accept help in a specific area of their life      Patient Participation / Response:  Fully participated with the group by sharing personal reflections / insights and openly received / provided feedback with other participants.    Participated through active listening and discussion.  Verbalized understanding of the subject.  Discussed barriers to asking for and accepting help.    Treatment Plan:  Patient has a current master individualized treatment plan.  See Epic  treatment plan for more information.    Diana Sykes LGSW

## 2023-11-20 ENCOUNTER — HOSPITAL ENCOUNTER (OUTPATIENT)
Dept: BEHAVIORAL HEALTH | Facility: CLINIC | Age: 58
Discharge: HOME | End: 2023-11-20
Attending: PSYCHIATRY & NEUROLOGY
Payer: COMMERCIAL

## 2023-11-20 PROCEDURE — 90853 GROUP PSYCHOTHERAPY: CPT

## 2023-11-20 PROCEDURE — 90853 GROUP PSYCHOTHERAPY: CPT | Performed by: SOCIAL WORKER

## 2023-11-20 NOTE — GROUP NOTE
Process Group Note    PATIENT'S NAME: Angi Canales  MRN:   [id number]  :   1965  ACCT. NUMBER: [id number]  DATE OF SERVICE: 23  START TIME:  9:00 AM  END TIME:  9:50 AM  FACILITATOR: Liza Mai LICSW  TOPIC:  Process Group    Diagnoses:  296.23 (F32.2) Major Depressive Disorder, Single Episode, Severe _ and With melancholic features  300.02 (F41.1) Generalized Anxiety Disorder.  300.01 (F41.0) Panic Disorder    [facility] Mental [facility] Outpatient Programs  TRACK: IOP 5    NUMBER OF PARTICIPANTS: 9        Data:    Session content: At the start of this group, patients were invited to check in by identifying themselves, describing their current emotional status, and identifying issues to address in this group.   Area(s) of treatment focus addressed in this session included Symptom Management, Personal Safety, Community Resources/Discharge Planning, Abstinence/Relapse Prevention, Develop / Improve Independent Living Skills, and Develop Socialization / Interpersonal Relationship Skills.    Angi reports intermittent depression symptoms over the weekend. She feels better today. Denies S/I or safety issues. She focused tasks at home including repotting plants. She went to [employer]. She is getting ready for Thanksgiving which she will spend with family. Angi is using coping skills for symptom management. She had her therapy appointment and will have her daughter join her on the next appointment to work on dynamics and communication. Angi would like to exercise today.      Therapeutic Interventions/Treatment Strategies:  Psychotherapist offered support, feedback and validation and reinforced use of skills. Treatment modalities used include Cognitive Behavioral Therapy.    Assessment:    Patient response:   Patient responded to session by accepting feedback, giving feedback, listening, focusing on goals, being attentive, accepting support, and verbalizing  understanding    Possible barriers to participation / learning include: and no barriers identified    Health Issues:   None reported       Substance Use Review:   Substance Use: No active concerns identified.    Mental Status/Behavioral Observations  Appearance:   Appropriate   Eye Contact:   Good   Psychomotor Behavior: Normal   Attitude:   Cooperative  Interested  Orientation:   All  Speech   Rate / Production: Normal    Volume:  Normal   Mood:    Less depressed and less anxious mood  Affect:    Appropriate   Thought Content:   Clear and Safety denies any current safety concerns including suicidal ideation, self-harm, and homicidal ideation  Thought Form:  Coherent  Goal Directed  Logical     Insight:    Good     Plan:   Safety Plan: No current safety concerns identified.  Recommended that patient call 911 or go to the local ED should there be a change in any of these risk factors.   Barriers to treatment: None identified  Patient Contracts (see media tab):  None  Substance Use: Not addressed in session   Continue or Discharge: Patient will continue in 55+ Program (55+) as planned. Patient is likely to benefit from learning and using skills as they work toward the goals identified in their treatment plan.      Liza Mai, [facility]  November 20, 2023

## 2023-11-20 NOTE — GROUP NOTE
Psychotherapy Group Note    PATIENT'S NAME: Angi Canales  MRN:   [id number]  :   1965  ACCT. NUMBER: [id number]  DATE OF SERVICE: 23  START TIME: 11:00 AM  END TIME: 11:50 AM  FACILITATOR: Liza Mai LICSW  TOPIC: MH EBP Group: Emotions Management  [facility] Mental [facility] Outpatient Programs  TRACK: IOP 5    NUMBER OF PARTICIPANTS: 8    Summary of Group / Topics Discussed:  Emotions Management: Anger: Patients explored and shared personal experiences associated with feelings of anger.  Group explored how these feelings develop, what they mean to each individual, and how to increase acceptance and usefulness of these feelings.  Discussed anger as a  secondary  emotion and reviewed ways to manage anger and challenge associated cognitive distortions. Group members worked to contextualize these concepts and promote healing.     Patient Session Goals / Objectives:  Discuss and review definitions and personal views/experiences with anger  Explore how feelings of anger impact functioning  Understand and practice strategies to manage difficult emotions and move towards healing  Demonstrate understanding of the feelings of anger  Verbalize how these emotions have impacted their lives/functioning  Verbalize of knowledge gained and possible interventions to manage feelings      Patient Participation / Response:  Fully participated with the group by sharing personal reflections / insights and openly received / provided feedback with other participants.    Demonstrated understanding of topics discussed through group discussion and participation    Treatment Plan:  Patient has a current master individualized treatment plan.  See Epic treatment plan for more information.    RAYMUNDO Headley

## 2023-11-20 NOTE — GROUP NOTE
Psychotherapy Group Note    PATIENT'S NAME: Angi Canales  MRN:   [id number]  :   1965  ACCT. NUMBER: [id number]  DATE OF SERVICE: 23  START TIME: 10:00 AM  END TIME: 10:50 AM  FACILITATOR: Kelsey Sue LGSW  TOPIC: MH EBP Group: Specialty Awareness  [facility] Mental Health Outpatient Programs  TRACK: IOP 5    NUMBER OF PARTICIPANTS: 8    Summary of Group / Topics Discussed:Trust  Specialty Topics: Trust  Definition of trust.  Trust with self and in relationships.      Patient Participation / Response:  Fully participated with the group by sharing personal reflections / insights and openly received / provided feedback with other participants.    Demonstrated understanding of topics discussed through group discussion and participation, Identified / Expressed readiness to act on skill suggestions discussed in topic, and Verbalized understanding of ways to proactively manage illness    Treatment Plan:  Patient has a current master individualized treatment plan.  See Epic treatment plan for more information.    CIARA Baez

## 2023-11-21 ENCOUNTER — HOSPITAL ENCOUNTER (OUTPATIENT)
Dept: BEHAVIORAL HEALTH | Facility: CLINIC | Age: 58
Discharge: HOME | End: 2023-11-21
Attending: PSYCHIATRY & NEUROLOGY
Payer: COMMERCIAL

## 2023-11-21 PROCEDURE — 90853 GROUP PSYCHOTHERAPY: CPT

## 2023-11-21 PROCEDURE — 90853 GROUP PSYCHOTHERAPY: CPT | Performed by: SOCIAL WORKER

## 2023-11-21 NOTE — GROUP NOTE
Process Group Note    PATIENT'S NAME: Angi Canales  MRN:   [id number]  :   1965  ACCT. NUMBER: [id number]  DATE OF SERVICE: 23  START TIME:  9:00 AM  END TIME:  9:50 AM  FACILITATOR: Liza Mai LICSW  TOPIC:  Process Group    Diagnoses:  296.23 (F32.2) Major Depressive Disorder, Single Episode, Severe _ and With melancholic features  300.02 (F41.1) Generalized Anxiety Disorder.  300.01 (F41.0) Panic Disorder    [facility] Mental Health Outpatient Programs  TRACK: IOP 5    NUMBER OF PARTICIPANTS: 8        Data:    Session content: At the start of this group, patients were invited to check in by identifying themselves, describing their current emotional status, and identifying issues to address in this group.   Area(s) of treatment focus addressed in this session included Symptom Management, Personal Safety, Community Resources/Discharge Planning, Abstinence/Relapse Prevention, Develop / Improve Independent Living Skills, and Develop Socialization / Interpersonal Relationship Skills.    Angi reports sad, depressed and anxious mood. Denies S/I or safety issues. She processed interpersonal relationship dynamics within her family. She is estranged from her sister. She feels a sense of loss with her brother passing away. She is anticipating the iday. She will spend it with her daughter and her daughter's family. Angi continues to use coping skills for symptom management.  She may spend time with friend's at a concert.        Therapeutic Interventions/Treatment Strategies:  Psychotherapist offered support, feedback and validation and reinforced use of skills. Treatment modalities used include Cognitive Behavioral Therapy.    Assessment:    Patient response:   Patient responded to session by accepting feedback, giving feedback, listening, focusing on goals, being attentive, accepting support, and verbalizing understanding    Possible barriers to participation /  learning include: and no barriers identified    Health Issues:   None reported. Reports medication compliance.       Substance Use Review:   Substance Use: No active concerns identified.    Mental Status/Behavioral Observations  Appearance:   Appropriate   Eye Contact:   Good   Psychomotor Behavior: Normal   Attitude:   Cooperative  Interested  Orientation:   All  Speech   Rate / Production: Normal    Volume:  Normal   Mood:    Anxious  Depressed  Sad   Affect:    Appropriate  Worrisome   Thought Content:   Clear and Safety denies any current safety concerns including suicidal ideation, self-harm, and homicidal ideation  Thought Form:  Coherent  Goal Directed  Logical     Insight:    Good     Plan:   Safety Plan: No current safety concerns identified.  Recommended that patient call 911 or go to the local ED should there be a change in any of these risk factors.   Barriers to treatment: None identified  Patient Contracts (see media tab):  None  Substance Use: Not addressed in session   Continue or Discharge: Patient will continue in 55+ Program (55+) as planned. Patient is likely to benefit from learning and using skills as they work toward the goals identified in their treatment plan.      Liza Mai, [facility]  November 21, 2023

## 2023-11-21 NOTE — GROUP NOTE
Psychotherapy Group Note    PATIENT'S NAME: Angi Canales  MRN:   [id number]  :   1965  ACCT. NUMBER: [id number]  DATE OF SERVICE: 23  START TIME: 11:00 AM  END TIME: 11:50 AM  FACILITATOR: Liza Mai LICSW  TOPIC: MH EBP Group: Specialty [facility] Mental [facility] Outpatient Programs  TRACK: IOP 5    NUMBER OF PARTICIPANTS: 8    Summary of Group / Topics Discussed:  Specialty Topics: Forgiveness: Patients were provided with an overview of the topic of forgiveness including how to better understand the nature of forgiveness of others and oneself. Exploring the phases of forgiveness and how one works them was covered. Patients were able to explore how practicing forgiveness may positively impact their functioning.     Patient Session Goals / Objectives:  Discussed definitions of forgiveness and self-forgiveness  Explored the phases and process of forgiveness  Discussed benefits of forgiveness to their relationships with themselves and others, connecting the concept to mindfulness and acceptance  Assisted patients in recognizing when practicing forgiveness may be helpful      Patient Participation / Response:  Fully participated with the group by sharing personal reflections / insights and openly received / provided feedback with other participants.    Demonstrated understanding of topics discussed through group discussion and participation, Identified / Expressed readiness to act on skill suggestions discussed in topic, and Practiced skills in session    Treatment Plan:  Patient has a current master individualized treatment plan.  See Epic treatment plan for more information.    RAYMUNDO Headley

## 2023-11-21 NOTE — GROUP NOTE
Psychoeducation Group Note    PATIENT'S NAME: Angi Canales  MRN:   [id number]  :   1965  ACCT. NUMBER: [id number]  DATE OF SERVICE: 23  START TIME: 10:00 AM  END TIME: 10:50 AM  FACILITATOR: Diana Sykes LGSW  TOPIC: MH Wellness Group: Mental Health Maintenance  [facility] Mental [facility] Outpatient Programs  TRACK: 55+ IOP 5    NUMBER OF PARTICIPANTS: 7    Summary of Group / Topics Discussed:  Mental Health Maintenance:  Vulnerability: In this group, the concept of vulnerability was explored through the viewing, discussion, and self-reflection of the [employer] Talk Titled,  The Power of Vulnerability.      Patient Session Goals / Objectives:  Defined and described definition of vulnerability   Identified 2 or more ways of practicing authenticity       Patient Participation / Response:  Fully participated with the group by sharing personal reflections / insights and openly received / provided feedback with other participants.    Demonstrated understanding of topics discussed through group discussion and participation, Identified / Expressed personal readiness to practice skills, and Verbalized understanding of mental health maintenance topic    Treatment Plan:  Patient has a current master individualized treatment plan.  See Epic treatment plan for more information.    CIARA Orosco

## 2023-11-22 ENCOUNTER — HOSPITAL ENCOUNTER (OUTPATIENT)
Dept: BEHAVIORAL HEALTH | Facility: CLINIC | Age: 58
Discharge: HOME | End: 2023-11-22
Attending: PSYCHIATRY & NEUROLOGY
Payer: COMMERCIAL

## 2023-11-22 PROCEDURE — 90853 GROUP PSYCHOTHERAPY: CPT | Performed by: SOCIAL WORKER

## 2023-11-22 PROCEDURE — 90853 GROUP PSYCHOTHERAPY: CPT

## 2023-11-22 NOTE — GROUP NOTE
Psychotherapy Group Note    PATIENT'S NAME: Angi Canales  MRN:   [id number]  :   1965  ACCT. NUMBER: [id number]  DATE OF SERVICE: 23  START TIME: 11:00 AM  END TIME: 11:50 AM  FACILITATOR: Liza Mai LICSW  TOPIC: MH EBP Group: Zohra PERSAUD [facility] Mental Health Outpatient Programs  TRACK: IOP 5    NUMBER OF PARTICIPANTS: 8    Summary of Group / Topics Discussed:  Coping Skills: Relapse Planning for Thanksgiving: Patients discussed and increased understanding of how anticipating and planning for possible increased symptoms is a proactive way to reduce the likelihood of relapse.  Patients shared individualized factors that may lead to increased symptoms and decompensation in functioning.  Each patient developed a relapse prevention plan designed to help them recognize when they may have increasing symptoms requiring them to take action and notify their key supports and care team.      Patient Session Goals / Objectives:  Identify and understand what factors may contribute to symptom relapse.  Identify actions that may be taken to manage increased symptoms/stressors.  Create an individualized written relapse plan  Problem solve barriers to plan creation and implementation  Share relapse plan with key support people      Patient Participation / Response:  Fully participated with the group by sharing personal reflections / insights and openly received / provided feedback with other participants.    Demonstrated understanding of topics discussed through group discussion and participation    Treatment Plan:  Patient has a current master individualized treatment plan.  See Epic treatment plan for more information.    RAYMUNDO Headley

## 2023-11-22 NOTE — GROUP NOTE
Psychotherapy Group Note    PATIENT'S NAME: Angi Canales  MRN:   [id number]  :   1965  ACCT. NUMBER: [id number]  DATE OF SERVICE: 23  START TIME: 10:00 AM  END TIME: 10:50 AM  FACILITATOR: Kelsey Sue LGSW  TOPIC: MH EBP Group: Specialty Awareness  [facility] Mental Health Outpatient Programs  TRACK: IOP 5    NUMBER OF PARTICIPANTS: 8    Summary of Group / Topics Discussed:  Specialty Topics: Hope: The topic of hope was presented in order to help patients better understand the symptoms of hopelessness and how to become more hopeful. Patients discussed their current awareness of the topic and relevance to their functioning. Individual experiences with symptoms and treatment options were also discussed. Patients explored options for ongoing/future treatment and symptom management.      Patient Session Goals / Objectives:  Discussed definition of hopelessness  Discussed how hopelessness impacts functioning  Set a plan to utilize skills to reduce hopelessness      Patient Participation / Response:  Fully participated with the group by sharing personal reflections / insights and openly received / provided feedback with other participants.    Demonstrated understanding of topics discussed through group discussion and participation, Identified / Expressed readiness to act on skill suggestions discussed in topic, and Verbalized understanding of ways to proactively manage illness    Treatment Plan:  Patient has a current master individualized treatment plan.  See Epic treatment plan for more information.    CIARA Baez

## 2023-11-22 NOTE — GROUP NOTE
Process Group Note    PATIENT'S NAME: Angi Canales  MRN:   [id number]  :   1965  ACCT. NUMBER: [id number]  DATE OF SERVICE: 23  START TIME:  9:00 AM  END TIME:  9:50 AM  FACILITATOR: Liza Mai LICSW  TOPIC:  Process Group    Diagnoses:  296.23 (F32.2) Major Depressive Disorder, Single Episode, Severe _ and With melancholic features  300.02 (F41.1) Generalized Anxiety Disorder.  300.01 (F41.0) Panic Disorder    [facility]  TRACK: IOP 5     NUMBER OF PARTICIPANTS: 8        Data:    Session content: At the start of this group, patients were invited to check in by identifying themselves, describing their current emotional status, and identifying issues to address in this group.   Area(s) of treatment focus addressed in this session included Symptom Management, Personal Safety, Community Resources/Discharge Planning, Abstinence/Relapse Prevention, Develop / Improve Independent Living Skills, and Develop Socialization / Interpersonal Relationship Skills.      Angi reports less depressed and less anxious mood. Denies S/I or safety issues. She reports using CBT skills including thought stopping to deal with rumination. She reports less anhedonia and is looking forward to spending time with friends at a music concert tonight as well as with her daughter and her family on . Angi has a weekend plan with family.     Therapeutic Interventions/Treatment Strategies:  Psychotherapist offered support, feedback and validation and reinforced use of skills. Treatment modalities used include Cognitive Behavioral Therapy.    Assessment:    Patient response:   Patient responded to session by accepting feedback, giving feedback, listening, focusing on goals, being attentive, and verbalizing understanding    Possible barriers to participation / learning include: and no barriers identified    Health Issues:   None reported. Reports medication  compliance.       Substance Use Review:   Substance Use: No active concerns identified.    Mental Status/Behavioral Observations  Appearance:   Appropriate   Eye Contact:   Good   Psychomotor Behavior: Normal   Attitude:   Cooperative  Interested  Orientation:   All  Speech   Rate / Production: Normal    Volume:  Normal   Mood:    Less depressed and less anxious mood  Affect:    Appropriate   Thought Content:   Clear and Safety denies any current safety concerns including suicidal ideation, self-harm, and homicidal ideation  Thought Form:  Coherent  Goal Directed  Logical     Insight:    Good     Plan:   Safety Plan: No current safety concerns identified.  Recommended that patient call 911 or go to the local ED should there be a change in any of these risk factors.   Barriers to treatment: None identified  Patient Contracts (see media tab):  None  Substance Use: Not addressed in session   Continue or Discharge: Patient will continue in 55+ Program (55+) as planned. Patient is likely to benefit from learning and using skills as they work toward the goals identified in their treatment plan.      Liza Mai, [facility]  November 22, 2023

## 2023-11-28 ENCOUNTER — HOSPITAL ENCOUNTER (OUTPATIENT)
Dept: BEHAVIORAL HEALTH | Facility: CLINIC | Age: 58
Discharge: HOME | End: 2023-11-28
Attending: PSYCHIATRY & NEUROLOGY
Payer: COMMERCIAL

## 2023-11-28 PROCEDURE — 90853 GROUP PSYCHOTHERAPY: CPT | Performed by: SOCIAL WORKER

## 2023-11-28 PROCEDURE — 90853 GROUP PSYCHOTHERAPY: CPT

## 2023-11-28 NOTE — GROUP NOTE
Psychotherapy Group Note    PATIENT'S NAME: Angi Canales  MRN:   [id number]  :   1965  ACCT. NUMBER: [id number]  DATE OF SERVICE: 23  START TIME: 10:00 AM  END TIME: 10:50 AM  FACILITATOR: Kelsey Sue LGSW  TOPIC: MH EBP Group: Coping Skills  [facility]  TRACK: IOP 5    NUMBER OF PARTICIPANTS: 7    Summary of Group / Topics Discussed:  Coping Skills: Relapse Planning: Patients discussed and increased understanding of how anticipating and planning for possible increased symptoms is a proactive way to reduce the likelihood of relapse.  Patients shared individualized factors that may lead to increased symptoms and decompensation in functioning.  Each patient developed a relapse prevention plan designed to help them recognize when they may have increasing symptoms requiring them to take action and notify their key supports and care team.      Patient Session Goals / Objectives:  Identify and understand what factors may contribute to symptom relapse.  Identify actions that may be taken to manage increased symptoms/stressors.  Create an individualized written relapse plan  Problem solve barriers to plan creation and implementation  Share relapse plan with key support people      Patient Participation / Response:  Fully participated with the group by sharing personal reflections / insights and openly received / provided feedback with other participants.    Demonstrated understanding of topics discussed through group discussion and participation, Expressed understanding of the relevance / importance of coping skills at distressing times in life, and Demonstrated knowledge of when to consider using a variety of coping skills in daily life    Treatment Plan:  Patient has a current master individualized treatment plan.  See Epic treatment plan for more information.    CIARA Baez

## 2023-11-28 NOTE — GROUP NOTE
Psychotherapy Group Note    PATIENT'S NAME: Angi Canales  MRN:   [id number]  :   1965  ACCT. NUMBER: [id number]  DATE OF SERVICE: 23  START TIME: 11:00 AM  END TIME: 11:50 AM  FACILITATOR: Liza Mai LICSW  TOPIC: MH EBP Group: Cognitive Restructuring  [facility]  TRACK: IOP 5    NUMBER OF PARTICIPANTS: 7    Summary of Group / Topics Discussed:  Cognitive Restructuring: Core Beliefs: Patients received an overview of what a core belief is, and how they develop. Patients then began to identify their negative core beliefs. Patients worked to modify core beliefs with the goal of improved self-image and functioning.     Patient Session Goals / Objectives:  Familiarize self with the concept of core beliefs and how they develop.    Explore personal core beliefs (positive and negative)  Develop / advance recognition of the connection between negative thoughts and negative core beliefs.  Formulate new neutral/positive core beliefs  Completed thought record             Patient Participation / Response:  Fully participated with the group by sharing personal reflections / insights and openly received / provided feedback with other participants.    Demonstrated understanding of topics discussed through group discussion and participation    Treatment Plan:  Patient has a current master individualized treatment plan.  See Epic treatment plan for more information.    RAYMUNDO Headley

## 2023-11-28 NOTE — GROUP NOTE
Process Group Note    PATIENT'S NAME: Angi Canales  MRN:   [id number]  :   1965  ACCT. NUMBER: [id number]  DATE OF SERVICE: 23  START TIME:  9:00 AM  END TIME:  9:50 AM  FACILITATOR: Liza Mai LICSW  TOPIC:  Process Group    Diagnoses:  296.23 (F32.2) Major Depressive Disorder, Single Episode, Severe _ and With melancholic features  300.02 (F41.1) Generalized Anxiety Disorder.  300.01 (F41.0) Panic Disorder    [facility]  TRACK: IOP 5    NUMBER OF PARTICIPANTS: 7        Data:    Session content: At the start of this group, patients were invited to check in by identifying themselves, describing their current emotional status, and identifying issues to address in this group.   Area(s) of treatment focus addressed in this session included Symptom Management, Personal Safety, Community Resources/Discharge Planning, Abstinence/Relapse Prevention, Develop / Improve Independent Living Skills, and Develop Socialization / Interpersonal Relationship Skills.    Angi reports depressed mood. Denies S/I or safety issues. She is using coping skills for symptom management. She has been watching a series with [employer] about people's stories with mental illness. Angi has a second job interview today and it looks hopeful. She has identified her current job as stressful.     Therapeutic Interventions/Treatment Strategies:  Psychotherapist offered support, feedback and validation and reinforced use of skills. Treatment modalities used include Cognitive Behavioral Therapy.    Assessment:    Patient response:   Patient responded to session by accepting feedback, giving feedback, listening, focusing on goals, being attentive, and verbalizing understanding    Possible barriers to participation / learning include: and no barriers identified    Health Issues:   None reported. Reports medication compliance.       Substance Use Review:   Substance Use: No active  concerns identified.    Mental Status/Behavioral Observations  Appearance:   Appropriate   Eye Contact:   Good   Psychomotor Behavior: Normal   Attitude:   Cooperative  Interested  Orientation:   All  Speech   Rate / Production: Normal    Volume:  Normal   Mood:    Depressed   Affect:    Appropriate   Thought Content:   Clear and Safety denies any current safety concerns including suicidal ideation, self-harm, and homicidal ideation  Thought Form:  Coherent  Goal Directed  Logical     Insight:    Good     Plan:   Safety Plan: No current safety concerns identified.  Recommended that patient call 911 or go to the local ED should there be a change in any of these risk factors.   Barriers to treatment: None identified  Patient Contracts (see media tab):  None  Substance Use: Not addressed in session   Continue or Discharge: Patient will continue in 55+ Program (55+) as planned. Patient is likely to benefit from learning and using skills as they work toward the goals identified in their treatment plan.      Liza Mai, [facility]  November 28, 2023

## 2023-11-29 ENCOUNTER — HOSPITAL ENCOUNTER (OUTPATIENT)
Dept: BEHAVIORAL HEALTH | Facility: CLINIC | Age: 58
Discharge: HOME | End: 2023-11-29
Attending: PSYCHIATRY & NEUROLOGY
Payer: COMMERCIAL

## 2023-11-29 PROCEDURE — 90853 GROUP PSYCHOTHERAPY: CPT | Performed by: SOCIAL WORKER

## 2023-11-29 PROCEDURE — 90853 GROUP PSYCHOTHERAPY: CPT

## 2023-11-29 NOTE — GROUP NOTE
Psychotherapy Group Note    PATIENT'S NAME: Angi Canales  MRN:   [id number]  :   1965  ACCT. NUMBER: [id number]  DATE OF SERVICE: 23  START TIME: 11:00 AM  END TIME: 11:50 AM  FACILITATOR: Liza Mai LICSW  TOPIC: MH EBP Group: Coping Skills  [facility]  TRACK: IOP 5    NUMBER OF PARTICIPANTS: 6    Summary of Group / Topics Discussed:  Coping Skills: Improve the Moment with Laughter: Patients learned  applying strategies to promote positive change in the present moment.  Patients will identified situations where they would benefit from applying strategies to improve the moment and reduce distress. Patients discussed how to distinguish when this can be useful in their lives or when other strategies would be more relevant or helpful.    Patient Session Goals / Objectives:  Discuss how the use of intentional  in the moment  actions with laughter can help reduce distress.  Review patients current practices and discuss a more formal way of practicing and accessing skills.  Increase ability to decide when to use improve the moment strategies  Choose 1-2 in the moment actions to apply during times of distress.      Patient Participation / Response:  Fully participated with the group by sharing personal reflections / insights and openly received / provided feedback with other participants.    Demonstrated understanding of topics discussed through group discussion and participation    Treatment Plan:  Patient has a current master individualized treatment plan.  See Epic treatment plan for more information.    RAYMUNDO Headley

## 2023-11-29 NOTE — GROUP NOTE
Psychotherapy Group Note    PATIENT'S NAME: Angi Canales  MRN:   [id number]  :   1965  ACCT. NUMBER: [id number]  DATE OF SERVICE: 23  START TIME: 10:00 AM  END TIME: 10:50 AM  FACILITATOR: Kelsey Sue LGSW  TOPIC: MH EBP Group: Symptom Awareness  [facility]  TRACK: IOP 5    NUMBER OF PARTICIPANTS: 6    Summary of Group / Topics Discussed:  Symptom Awareness: Mood Disorders: Patients received a general overview of mood disorders including depressive disorders, anxiety disorders, and bipolar disorders and how it relates to their current symptoms. The purpose is to promote understanding of their diagnoses and how it impacts their functioning. Patients reviewed their current awareness of symptoms and diagnoses. Patients received information regarding diagnoses, etiology, cultural, and environmental factors as well as impact on functioning.     Patient Session Goals / Objectives:  Discussed patient individual symptoms and experiences  Reviewed diagnostic criteria and etiology of diagnoses       Patient Participation / Response:  Fully participated with the group by sharing personal reflections / insights and openly received / provided feedback with other participants.    Demonstrated understanding of topics discussed through group discussion and participation, Demonstrated understanding of how information regarding symptoms can assist in management of symptoms, and Identified / Expressed personal readiness to increase awareness of symptoms and apply skills as necessary    Treatment Plan:  Patient has a current master individualized treatment plan.  See Epic treatment plan for more information.    CIARA Baez

## 2023-11-29 NOTE — GROUP NOTE
Process Group Note    PATIENT'S NAME: Angi Canales  MRN:   [id number]  :   1965  ACCT. NUMBER: [id number]  DATE OF SERVICE: 23  START TIME:  9:00 AM  END TIME:  9:50 AM  FACILITATOR: Liza Mai LICSW  TOPIC:  Process Group    Diagnoses:  296.23 (F32.2) Major Depressive Disorder, Single Episode, Severe _ and With melancholic features  300.02 (F41.1) Generalized Anxiety Disorder.  300.01 (F41.0) Panic Disorder    [facility]  TRACK: IOP 5    NUMBER OF PARTICIPANTS: 6        Data:    Session content: At the start of this group, patients were invited to check in by identifying themselves, describing their current emotional status, and identifying issues to address in this group.   Area(s) of treatment focus addressed in this session included Symptom Management, Personal Safety, Community Resources/Discharge Planning, Abstinence/Relapse Prevention, Develop / Improve Independent Living Skills, and Develop Socialization / Interpersonal Relationship Skills.    Angi reports sad, anxious with periods of irritability and worry. She presents with tearful affect. Denies S/I or safety issues. She processed the impact of racism in her family and how it has impacted her and her  daughter. .  Angi has been estranged from her sister for three years now.  She will have her daughter join her individual therapy session today. Angi continues using coping skills for symptom management including grounding and journaling. She is aware of emotion regulation and management skills.      Therapeutic Interventions/Treatment Strategies:  Psychotherapist offered support, feedback and validation and reinforced use of skills. Treatment modalities used include Cognitive Behavioral Therapy.    Assessment:    Patient response:   Patient responded to session by accepting feedback, giving feedback, listening, focusing on goals, being attentive, accepting support, and  verbalizing understanding    Possible barriers to participation / learning include: and no barriers identified    Health Issues:   None reported. Reports medication compliance.       Substance Use Review:   Substance Use: No active concerns identified.    Mental Status/Behavioral Observations  Appearance:   Appropriate   Eye Contact:   Good   Psychomotor Behavior: Normal   Attitude:   Cooperative  Interested  Orientation:   All  Speech   Rate / Production: Normal    Volume:  Normal   Mood:    Anxious  Sad   Affect:    Appropriate  Worrisome   Thought Content:   Clear and Safety denies any current safety concerns including suicidal ideation, self-harm, and homicidal ideation  Thought Form:  Coherent  Goal Directed  Logical     Insight:    Good     Plan:   Safety Plan: No current safety concerns identified.  Recommended that patient call 911 or go to the local ED should there be a change in any of these risk factors.   Barriers to treatment: None identified  Patient Contracts (see media tab):  None  Substance Use: Not addressed in session   Continue or Discharge: Patient will continue in 55+ Program (55+) as planned. Patient is likely to benefit from learning and using skills as they work toward the goals identified in their treatment plan.      Liza Mai, [facility]  November 29, 2023

## 2023-12-01 ENCOUNTER — HOSPITAL ENCOUNTER (OUTPATIENT)
Dept: BEHAVIORAL HEALTH | Facility: CLINIC | Age: 58
Discharge: HOME | End: 2023-12-01
Attending: PSYCHIATRY & NEUROLOGY
Payer: COMMERCIAL

## 2023-12-01 PROCEDURE — 90853 GROUP PSYCHOTHERAPY: CPT | Performed by: SOCIAL WORKER

## 2023-12-01 PROCEDURE — 90853 GROUP PSYCHOTHERAPY: CPT | Performed by: COUNSELOR

## 2023-12-01 NOTE — GROUP NOTE
Psychotherapy Group Note    PATIENT'S NAME: Angi Canales  MRN:   [id number]  :   1965  ACCT. NUMBER: [id number]  DATE OF SERVICE: 23  START TIME: 11:00 AM  END TIME: 11:50 AM  FACILITATOR: Liza Mai LICSW  TOPIC: MH EBP Group: [facility]  TRACK: IOP 5    NUMBER OF PARTICIPANTS: 6    Summary of Group / Topics Discussed:  Coping Skills: Additional Coping Skills: Self-care:  Reviewed education on the framework of life balance that includes three intersecting circles (productivity, self-care, and leisure.)  Facilitated collaborative discussion about self-care including why it is important, barriers to engaging in it, and examples of current self-care practice. Brainstormed with the group to develop a list of self-care activities. Educated on the importance of approaching self-care with self-compassion and canelo.  Discussed possible benefits of engaging in self-care including improved confidence, increased sense of structure and routine, and feeling rested.  Instructed group members to select one or two self-care activities they would like to explore in the coming weeks and provided an opportunity to brainstorm strategies for success.         Patient Session Goals / Objectives:     Reflected on self-care benefits and barriers through a group brainstorming process.      Identified areas of self-care that are a current priority and set realistic goals for these areas.     Identify strategies and skills to meet specific needs and address barriers.     Reflected on their current self-care engagement and shared insight about their progress.       Patient Participation / Response:  Fully participated with the group by sharing personal reflections / insights and openly received / provided feedback with other participants.    Demonstrated understanding of topics discussed through group discussion and participation, Demonstrated knowledge of  when to consider using a variety of coping skills in daily life, and Identified barriers to applying coping skills    Treatment Plan:  Patient has a current master individualized treatment plan.  See Epic treatment plan for more information.    Liza Mai, [facility]

## 2023-12-01 NOTE — GROUP NOTE
Process Group Note    PATIENT'S NAME: Angi Canales  MRN:   [id number]  :   1965  ACCT. NUMBER: [id number]  DATE OF SERVICE: 23  START TIME:  9:00 AM  END TIME:  9:50 AM  FACILITATOR: Liza Mai LICSW  TOPIC:  Process Group    Diagnoses:  296.23 (F32.2) Major Depressive Disorder, Single Episode, Severe _ and With melancholic features  300.02 (F41.1) Generalized Anxiety Disorder.  300.01 (F41.0) Panic Disorder    [facility]  TRACK: IOP    NUMBER OF PARTICIPANTS: 6        Data:    Session content: At the start of this group, patients were invited to check in by identifying themselves, describing their current emotional status, and identifying issues to address in this group.   Area(s) of treatment focus addressed in this session included Symptom Management, Personal Safety, Community Resources/Discharge Planning, Abstinence/Relapse Prevention, Develop / Improve Independent Living Skills, and Develop Socialization / Interpersonal Relationship Skills.    Angi reports less depressed and less anxious mood. Denies S/I or safety issues. She reports an increase in mood stabilization. Angi and her  adult daughter processed communication styles with the support of her outpatient individual therapist and it was helpful.  Angi is using coping skills for symptom management. She reported on alcohol use. She does not believe her pattern of use is a current issue. She is aware of self care activities.       Therapeutic Interventions/Treatment Strategies:  Psychotherapist offered support, feedback and validation and reinforced use of skills. Treatment modalities used include Cognitive Behavioral Therapy.    Assessment:    Patient response:   Patient responded to session by accepting feedback, giving feedback, listening, focusing on goals, being attentive, and verbalizing understanding    Possible barriers to participation / learning include: and no  barriers identified    Health Issues:   None reported. Reports medication compliance.        Substance Use Review:   Substance Use: No active concerns identified.    Mental Status/Behavioral Observations  Appearance:   Appropriate   Eye Contact:   Good   Psychomotor Behavior: Normal   Attitude:   Cooperative  Interested  Orientation:   All  Speech   Rate / Production: Normal    Volume:  Normal   Mood:    Less depressed and less anxious mood  Affect:    Appropriate   Thought Content:   Clear and Safety denies any current safety concerns including suicidal ideation, self-harm, and homicidal ideation  Thought Form:  Coherent  Goal Directed  Logical     Insight:    Good     Plan:   Safety Plan: No current safety concerns identified.  Recommended that patient call 911 or go to the local ED should there be a change in any of these risk factors.   Barriers to treatment: None identified  Patient Contracts (see media tab):  None  Substance Use: Not addressed in session   Continue or Discharge: Patient will continue in 55+ Program (55+) as planned. Patient is likely to benefit from learning and using skills as they work toward the goals identified in their treatment plan.      Liza Mai, [facility]  December 1, 2023

## 2023-12-04 ENCOUNTER — HOSPITAL ENCOUNTER (OUTPATIENT)
Dept: BEHAVIORAL HEALTH | Facility: CLINIC | Age: 58
Discharge: HOME | End: 2023-12-04
Attending: PSYCHIATRY & NEUROLOGY
Payer: COMMERCIAL

## 2023-12-04 PROCEDURE — 90853 GROUP PSYCHOTHERAPY: CPT | Performed by: SOCIAL WORKER

## 2023-12-04 PROCEDURE — 90853 GROUP PSYCHOTHERAPY: CPT

## 2023-12-04 NOTE — GROUP NOTE
Psychoeducation Group Note    PATIENT'S NAME: Angi Canales  MRN:   [id number]  :   1965  ACCT. NUMBER: [id number]  DATE OF SERVICE: 23  START TIME: 11:00 AM  END TIME: 11:50 AM  FACILITATOR: Liza Mai LICSW  TOPIC:  Wellness Group: [facility]  TRACK: IOP 5    NUMBER OF PARTICIPANTS: 6    Summary of Group / Topics Discussed:  Foundations of Health: Sleep: Sleep hygiene: Patients explored the connection between sleep and mental illness. Patients learned about how adequate sleep can improve health, productivity, wellness, quality of life, and safety.     Patient Session Goals / Objectives:  Demonstrated understanding of sleep hygiene practices and benefits of sleep  Identified sleep hygiene strategies to utilize   Described the connection between sleep disturbances and mental illness      Patient Participation / Response:  Fully participated with the group by sharing personal reflections / insights and openly received / provided feedback with other participants.    Demonstrated understanding of topics discussed through group discussion and participation and Verbalized understanding of foundations of health topic    Treatment Plan:  Patient has a current master individualized treatment plan.  See Epic treatment plan for more information.    RAYMUNDO Headlye

## 2023-12-04 NOTE — GROUP NOTE
Psychotherapy Group Note    PATIENT'S NAME: Angi Canales  MRN:   [id number]  :   1965  ACCT. NUMBER: [id number]  DATE OF SERVICE: 23  START TIME: 10:00 AM  END TIME: 10:50 AM  FACILITATOR: Diana Sykes LGSW  TOPIC: MH EBP Group: Coping Skills  [facility] Adult Mental Health Outpatient Programs  TRACK: IOP 5 55+    NUMBER OF PARTICIPANTS: 6    Summary of Group / Topics Discussed:  Coping Skills: Self-Soothe: Patients learned to apply self-soothe as a way to decrease heightened stress in the moment.  Patients identified situations that necessitate self-soothe strategies.  They focused on ways to manage physical symptoms of distress using the senses. They discussed how to distinguish when this can be useful in their lives when other strategies are more relevant or helpful.    Patient Session Goals / Objectives:  Understand the purpose of using the senses to decrease distress  Process what happens in the body when using self-soothe strategies  Demonstrate understanding of when to use self-soothe strategies  Identify and problem solve barriers to applying self-soothe strategies.  Choose 1-2 self-soothe strategies to apply during times of distress.      Patient Participation / Response:  Fully participated with the group by sharing personal reflections / insights and openly received / provided feedback with other participants.    Demonstrated understanding of topics discussed through group discussion and participation, Expressed understanding of the relevance / importance of coping skills at distressing times in life, Demonstrated knowledge of when to consider using a variety of coping skills in daily life, Identified barriers to applying coping skills, Identified 2-3 positive coping strategies that have helped maintain / improve symptoms in the past, Practiced 2-3 new coping skills in session, and Identified / Expressed personal readiness to practice new coping skills    Treatment Plan:  Patient  has a current master individualized treatment plan.  See Epic treatment plan for more information.    Diana Sykes LGSW

## 2023-12-04 NOTE — GROUP NOTE
"Process Group Note    PATIENT'S NAME: Angi Canales  MRN:   [id number]  :   1965  ACCT. NUMBER: [id number]  DATE OF SERVICE: 23  START TIME:  9:00 AM  END TIME:  9:50 AM  FACILITATOR: Lzia Mai [facility]  TOPIC:  Process Group    Diagnoses:  296.23 (F32.2) Major Depressive Disorder, Single Episode, Severe _ and With melancholic features  300.02 (F41.1) Generalized Anxiety Disorder.  300.01 (F41.0) Panic Disorder    [facility] Mental Health Outpatient Programs  TRACK: IOP 5    NUMBER OF PARTICIPANTS: 6        Data:    Session content: At the start of this group, patients were invited to check in by identifying themselves, describing their current emotional status, and identifying issues to address in this group.   Area(s) of treatment focus addressed in this session included Symptom Management, Personal Safety, Community Resources/Discharge Planning, Abstinence/Relapse Prevention, Develop / Improve Independent Living Skills, and Develop Socialization / Interpersonal Relationship Skills.    Angi reports less depressed and less anxious mood. \"I had a good weekend with her grandchildren.\"  Angi denies S/I or safety issues. She spent time with her daughter, and 3 grandchildren. She stayed at their home for an overnight. Angi continues to explore employment options. She had not heard back from the organization she interviewed with last week. She was encouraged by group peers to reach out to the company to express her interest.     Therapeutic Interventions/Treatment Strategies:  Psychotherapist offered support, feedback and validation and reinforced use of skills. Treatment modalities used include Cognitive Behavioral Therapy.    Assessment:    Patient response:   Patient responded to session by accepting feedback, giving feedback, listening, focusing on goals, being attentive, and verbalizing understanding    Possible barriers to participation / learning include: and no " barriers identified    Health Issues:   None reported. Reports medication compliance.       Substance Use Review:   Substance Use: No active concerns identified.    Mental Status/Behavioral Observations  Appearance:   Appropriate   Eye Contact:   Good   Psychomotor Behavior: Normal   Attitude:   Cooperative  Interested  Orientation:   All  Speech   Rate / Production: Normal    Volume:  Normal   Mood:    Less depressed and less anxious mood  Affect:    Appropriate   Thought Content:   Clear and Safety denies any current safety concerns including suicidal ideation, self-harm, and homicidal ideation  Thought Form:  Coherent  Goal Directed  Logical     Insight:    Good     Plan:   Safety Plan: No current safety concerns identified.  Recommended that patient call 911 or go to the local ED should there be a change in any of these risk factors.   Barriers to treatment: None identified  Patient Contracts (see media tab):  None  Substance Use: Not addressed in session   Continue or Discharge: Patient will continue in 55+ Program (55+) as planned. Patient is likely to benefit from learning and using skills as they work toward the goals identified in their treatment plan.      Liza Mai, [facility]  December 4, 2023

## 2023-12-05 ENCOUNTER — HOSPITAL ENCOUNTER (OUTPATIENT)
Dept: BEHAVIORAL HEALTH | Facility: CLINIC | Age: 58
Discharge: HOME | End: 2023-12-05
Attending: PSYCHIATRY & NEUROLOGY
Payer: COMMERCIAL

## 2023-12-05 DIAGNOSIS — F41.9 ANXIETY: ICD-10-CM

## 2023-12-05 DIAGNOSIS — F32.2 MDD (MAJOR DEPRESSIVE DISORDER), SINGLE EPISODE, SEVERE (H): Primary | ICD-10-CM

## 2023-12-05 PROCEDURE — 90853 GROUP PSYCHOTHERAPY: CPT | Performed by: SOCIAL WORKER

## 2023-12-05 PROCEDURE — 99214 OFFICE O/P EST MOD 30 MIN: CPT

## 2023-12-05 PROCEDURE — 90853 GROUP PSYCHOTHERAPY: CPT

## 2023-12-05 NOTE — GROUP NOTE
Psychotherapy Group Note    PATIENT'S NAME: Angi Canales  MRN:   [id number]  :   1965  ACCT. NUMBER: [id number]  DATE OF SERVICE: 23  START TIME: 11:00 AM  END TIME: 11:50 AM  FACILITATOR: Liza Mai LICSW  TOPIC: MH EBP Group: Zohra PERSAUD [facility] Adult Mental Health Outpatient Programs  TRACK: IOP 5    NUMBER OF PARTICIPANTS: 7    Summary of Group / Topics Discussed:  Coping Skills: Distraction: Patients learned to mindfully use distraction as a way to decrease heightened stress in the moment.  Patients will identified situations that necessitate healthy distraction strategies.  They explored ways to manage physical symptoms of distress using distraction. The group began to distinguish when this can be useful in their lives or when other strategies would be more relevant or helpful.    Patient Session Goals / Objectives:  Understand the purpose and benefits of using healthy distraction to decrease distress.  Process what happens in the body when using distraction strategies.  Demonstrate understanding of when to use distraction strategies.  Explore patient s current distraction activities, and how to take a more intentional approach to the use of distraction.  Identify and problem solve barriers to applying distraction strategies.  Choose 1-2 healthy distraction strategies to apply during times of distress.      Patient Participation / Response:  Fully participated with the group by sharing personal reflections / insights and openly received / provided feedback with other participants.    Demonstrated understanding of topics discussed through group discussion and participation    Treatment Plan:  Patient has a current master individualized treatment plan.  See Epic treatment plan for more information.    RAYMUNDO Headley

## 2023-12-05 NOTE — PROGRESS NOTES
"[facility]   Adult Mental Health Outpatient Programs  Provider Interval History Note    Program: Intensive Outpatient Program, track 5     PATIENT'S NAME: Angi Canales  MRN:   [id number]  :   1965  ACCT. NUMBER: [id number]  DATE OF SERVICE: 23    Interval History:  \"better.\" Angi presents today for follow-up and ongoing program supervision.   Endorses:  I scheduled to be done a week form  but I want to be done tomorrow  I am returning to work. I need a break bewtween therapy and work  I have a lot of things I would like to do before I return to work  I feel lot better. Therapy has helped me go thransion. I am more grounded and stronger  My job is a big stressor. I do not want to go back to this job. I am job hunting.  I will continue with individual therapy  Medications have been a miracle  Meeting with therapist on  and with outpatient provider on   Medication compliant and no reported side effects    Symptoms/Systems:  Sleep: I sleep a lot. I go to bed form 7 to 7:30 am. I feel rested in the morning. On the weekend I sleep about 13 hours.   Appetite: it is at baseline. I need to make more healthier diet  Daily function/ADLs: Working on that  Hygiene: On noted concerns  Socialization: Yes  Concerns about work or ability to work: JOSH    Substance use:  I drink very little, maybe once a week, 3-4 beers    Reactions/thoughts about program:  Helpful    Safety Assessment:  Suicidal ideation: denies current or recent suicidal ideation or behavior  Thoughts of non-suicidal self-injury: denied  Recent self-injurious behavior: denied  Homicidal ideation: denied  Other safety concerns: denied    Medications:  Current Outpatient Medications   Medication Sig Dispense Refill    buPROPion (WELLBUTRIN XL) 150 MG 24 hr tablet Take 150 mg by mouth every morning      citalopram (CELEXA) 20 MG tablet Take 1 tablet (20 mg) by mouth daily (Patient " taking differently: Take 40 mg by mouth daily) 90 tablet 0    LORazepam (ATIVAN) 0.5 MG tablet       venlafaxine (EFFEXOR XR) 37.5 MG 24 hr capsule Take 75 mg by mouth daily         The above list was reviewed and updated in EPIC with patient today.     Patient is taking medications as prescribed and denies adverse effects    Laboratory Results:  Most recent labs reviewed. Pertinent updates/findings: None.     Metrics:  PHQ-9 scores:       10/30/2012     8:00 AM 10/9/2023     1:26 PM 10/16/2023     9:38 AM 10/19/2023     7:43 AM   PHQ-9 SCORE   PHQ-9 Total Score 9      PHQ-9 Total Score MyChart  22 (Severe depression)  22 (Severe depression)   PHQ-9 Total Score  22 23 22       LUCIAN-7 scores:       10/30/2012    10:50 AM 10/8/2023     5:25 PM 10/16/2023     9:38 AM   LUCIAN-7 SCORE   Total Score 9     Total Score  19 (severe anxiety)    Total Score  19 21       CSSR-S: [place] Suicide Severity Rating Scale (Short Version)      10/3/2023    10:18 AM 10/3/2023     6:39 PM 10/10/2023     8:00 AM   [place] Suicide Severity Rating (Short Version)   Over the past 2 weeks have you felt down, depressed, or hopeless? yes     Over the past 2 weeks have you had thoughts of killing yourself? no     Have you ever attempted to kill yourself? no     Q1 Wished to be Dead (Past Month)  no no   Q2 Suicidal Thoughts (Past Month)  no no   Q3 Suicidal Thought Method  no no   Q4 Suicidal Intent without Specific Plan  no no   Q5 Suicide Intent with Specific Plan  no no   Q6 Suicide Behavior (Lifetime)   no   Level of Risk per Screen  low risk low risk   Interventions Monitored via video           Mental Status Examination:  Vital Signs: There were no vitals taken for this visit.   Appearance: adequately groomed, appears stated age, and in no apparent distress.  Attitude: cooperative   Eye Contact: good   Muscle Strength and Tone: no gross abnormalities   Psychomotor Behavior: normal or unremarkable   Gait and Station: normal width, turn, arm  "swing  Speech: normal rate, production, volume, and rhythm of  Associations: No loosening of associations  Thought Process: coherent and goal directed  Thought Content: no evidence of suicidal ideation or homicidal ideation, no evidence of psychotic thought, no auditory hallucinations present, and no visual hallucinations present  Mood: \"anxious\"  Affect: appropriate and in normal range  Insight: good  Judgment: intact, adequate for safety  Impulse Control: intact  Oriented to: time, place, person, and situation  Attention Span and Concentration: normal  Language: Intact  Recent and Remote Memory: intact to interview. Not formally assessed. No amnesia.  Fund of Knowledge/Assessment of Intelligence: Average  Capacity of Activities of Daily Living: Independent, able to participate in programmatic care services.    Diagnosis/es:    ICD-10-CM    1. MDD (major depressive disorder), single episode, severe (H)  F32.2       2. Anxiety  F41.9           Assessment/Plan:  Angi presents today for a follow-up. She endorsed feeling much better, and the medications have been a miracle. Still feeling stressed about returning to a toxic work environment is job searching. I do not have an objection to the patient's request to be discharged from [facility] tomorrow, a week and a half earlier, to prepare to return to work. She has reached maximum benefit with psychotherapy, milieu therapy, and medication management in [facility]. To ensure we consolidate current gains and prevent a possible relapse in mental health symptoms, the patient will continue with individual therapy and has an appointment with an outpatient provider for medication management on Dec 14 and 15, respectively. The patient verbalized understanding and agreed to the treatment plan.    296.23 (F32.2) Major Depressive Disorder, Single Episode, Severe and With melancholic features    Overall improved   Continue working with outpatient provider for medication management  Continue " with current medication regimen  Continue to maintain sleep hygiene  Continue to maintain a balanced diet  Continue to engage in physical activities as tolerated    300.02 (F41.1) Generalized Anxiety Disorder  Overall improved  3.As noted above   Continue all other medications as reviewed per electronic medical record today    Continue therapy as planned:  Discharging on 12/06/2023  I feel this patient does not meet criteria for an involuntary hold and is appropriate for treatment at an outpatient level of care.  Continue with individual therapist as appropriate    Safety plan reviewed:  To the Emergency Department as needed or call after hours crisis line at [phone] or [phone]. [place] Crisis Text Line: Text [place] to 496912 or Suicide LifeLine Chat: [employer]org/chat    Follow-up:   Follow up with outpatient provider(s) as planned or sooner if needed for acute medical concerns.    Questions or concerns:  Call program line with questions or concerns (see below)  [employer]hart may be used to communicate with your provider, but this is not intended to be used for emergencies.    [facility] Adult Mental Health Program lines:  [facility]: [phone]  Dual Disorder: [phone]  Adult Day Treatment:  [phone]  55+/Intensive Outpatient: [phone]    Community Resources:    National Suicide Prevention Lifeline: 988 from any phone, or 923-966-7686 (TTY: 615.633.7804). Call anytime for help.  (www.suicidepreventionlifeline.org)  National [place] on Mental Illness (www.buck.org): 270.889.7937 or 550-229-0762.   Mental Health Association (www.mentalhealth.org): 775.828.8257 or 970-102-7054.  [place] Crisis Text Line: Text [place] to 850196  Suicide LifeLine Chat: [employer].org/chat    Treatment Objective(s) Addressed in This Session:  The purpose of today's virtual visit is for this writer to provide oversight of patient's care while receiving program services. Specific  treatment goals addressed included personal safety, symptoms stabilization and management, wellness and mental health, and community resources/discharge planning.     This author or another program provider will follow up with the patient as noted above.     Patient agrees with the current plan of care.    YRIS FULTON CNP  12/05/23      Visit Details:  Type of service: In-person    Location (patient and provider): [facility] Adult Mental Health Outpatient Programmatic Care Offices    Level of Medical Decision Making:   - At least 2 stable chronic problems  - Engaged in prescription drug management during visit (discussed any medication benefits, side effects, alternatives, etc.)         30 min spent on the date of the encounter in chart review, patient visit, review of tests, documentation, care coordination, and/or discussion with other providers about the issues documented above.      This document completed in part using Dragon [employer] dictation software.  Please excuse any inadvertent word or phrase substitutions.

## 2023-12-05 NOTE — GROUP NOTE
Process Group Note    PATIENT'S NAME: Angi Canales  MRN:   [id number]  :   1965  ACCT. NUMBER: [id number]  DATE OF SERVICE: 23  START TIME:  9:00 AM  END TIME:  9:50 AM  FACILITATOR: Liza Mai LICSW  TOPIC:  Process Group    Diagnoses:  296.23 (F32.2) Major Depressive Disorder, Single Episode, Severe _ and With melancholic features  300.02 (F41.1) Generalized Anxiety Disorder.  300.01 (F41.0) Panic Disorder    [facility] Mental Health Outpatient Programs  TRACK: IOP 5    NUMBER OF PARTICIPANTS: 7        Data:    Session content: At the start of this group, patients were invited to check in by identifying themselves, describing their current emotional status, and identifying issues to address in this group.   Area(s) of treatment focus addressed in this session included Symptom Management, Personal Safety, Community Resources/Discharge Planning, Abstinence/Relapse Prevention, Develop / Improve Independent Living Skills, and Develop Socialization / Interpersonal Relationship Skills.    Angi reports improved mood stabilization. She reports less depressed and less anxious mood. Denies S/I or safety issues. She feels like she has increased capacity to function in an occupational setting. Angi requested that tomorrow be her last day in IOP so she can focus on task management at home as well as spend time with her family before the transition back to work.  Angi has coping skills for symptom management and a relapse prevention plan. She would also like to self care by celebrating her birthday which is on 2024. Discharge set for 2023. She reports having aftercare appointments with her therapist and medication management provider.    Therapeutic Interventions/Treatment Strategies:  Psychotherapist offered support, feedback and validation and reinforced use of skills. Treatment modalities used include Cognitive Behavioral Therapy.    Assessment:    Patient response:    Patient responded to session by verbalizing understanding    Possible barriers to participation / learning include: and no barriers identified    Health Issues:   None reported. Reports medication compliance.       Substance Use Review:   Substance Use: No active concerns identified.    Mental Status/Behavioral Observations  Appearance:   Appropriate   Eye Contact:   Good   Psychomotor Behavior: Normal   Attitude:   Cooperative  Interested  Orientation:   All  Speech   Rate / Production: Normal    Volume:  Normal   Mood:    Increase mood stabilization. Less depressed and less anxious mood  Affect:    Appropriate   Thought Content:   Clear and Safety denies any current safety concerns including suicidal ideation, self-harm, and homicidal ideation  Thought Form:  Coherent  Goal Directed  Logical     Insight:    Good     Plan:   Safety Plan: No current safety concerns identified.  Recommended that patient call 911 or go to the local ED should there be a change in any of these risk factors.   Barriers to treatment: None identified  Patient Contracts (see media tab):  None  Substance Use: Not addressed in session   Continue or Discharge: Patient will continue in 55+ Program (55+) as planned. Patient is likely to benefit from learning and using skills as they work toward the goals identified in their treatment plan. Tentative discharge for 12/6/2023.      Liza Mai, [facility]  December 5, 2023

## 2023-12-05 NOTE — GROUP NOTE
Psychotherapy Group Note    PATIENT'S NAME: Angi Canales  MRN:   [id number]  :   1965  ACCT. NUMBER: [id number]  DATE OF SERVICE: 23  START TIME: 10:00 AM  END TIME: 10:50 AM  FACILITATOR: Diana Sykes LGSW  TOPIC: MH EBP Group: Specialty [facility] Adult Mental Health Outpatient Programs  TRACK: 55+ IOP 5    NUMBER OF PARTICIPANTS: 5    Summary of Group / Topics Discussed:  Specialty Topics: Life Transitions: The topic of life transitions was presented in order to help patients to better understand the challenges presented by life transitions, and how to best navigate them. Exploring the phases of transition and how one works through them was discussed. Patients were provided with information regarding community resources.     Patient Session Goals / Objectives:  Discussed the timing and nature of major life transitions  Explored how life transitions may impact mental health and functioning  Discussed coping strategies to manage symptoms and help with transitioning  Discussed and planned a successful transition      Patient Participation / Response:  Fully participated with the group by sharing personal reflections / insights and openly received / provided feedback with other participants.    Demonstrated understanding of topics discussed through group discussion and participation, Identified / Expressed readiness to act on skill suggestions discussed in topic, Verbalized understanding of ways to proactively manage illness, Identified plan to address barriers to practicing skills discussed in topic, and Practiced skills in session    Treatment Plan:  Patient has a current master individualized treatment plan.  See Epic treatment plan for more information.    CIARA Orosco

## 2023-12-05 NOTE — GROUP NOTE
Psychotherapy Group Note    PATIENT'S NAME: Angi Canales  MRN:   [id number]  :   1965  ACCT. NUMBER: [id number]  DATE OF SERVICE: 23  START TIME: 10:00 AM  END TIME: 10:50 AM  FACILITATOR: Macey Dietz LPCC Maija Peterfeso, LGSW  TOPIC: MH EBP Group: Mindfulness  [facility] Mental Health Outpatient Programs  TRACK: IOP 5 55+    NUMBER OF PARTICIPANTS: 6    Summary of Group / Topics Discussed:  Mindfulness: Mindfulness Experiential: Patients received an overview on what mindfulness is and how mindfulness can benefit general health, mental health symptoms, and stressors. The history of mindfulness, its application to mental health therapies, and key concepts were also discussed. Patients discussed current awareness, knowledge, and practice of mindfulness skills. Patients also discussed barriers to mindfulness practice.    Patient Session Goals / Objectives:  Demonstrated and verbalized understanding of key mindfulness concepts  Identified when/how to use mindfulness skills  Resolved barriers to practicing mindfulness skills  Identified plan to use mindfulness skills in daily life       Patient Participation / Response:  Fully participated with the group by sharing personal reflections / insights and openly received / provided feedback with other participants.    Demonstrated understanding of topics discussed through group discussion and participation, Demonstrated understanding of mindfulness skills and benefits of practice, and Identified / Expressed personal readiness to practice mindfulness skills    Treatment Plan:  Patient has a current master individualized treatment plan.  See Epic treatment plan for more information.    This note was completed by SHAQ Elena,  as Kelsey Sue's last day was  and documentation was not completed at that time.     SHAQ Walters

## 2023-12-06 ENCOUNTER — HOSPITAL ENCOUNTER (OUTPATIENT)
Dept: BEHAVIORAL HEALTH | Facility: CLINIC | Age: 58
Discharge: HOME | End: 2023-12-06
Attending: PSYCHIATRY & NEUROLOGY
Payer: COMMERCIAL

## 2023-12-06 PROCEDURE — 90853 GROUP PSYCHOTHERAPY: CPT | Performed by: SOCIAL WORKER

## 2023-12-06 PROCEDURE — 90853 GROUP PSYCHOTHERAPY: CPT

## 2023-12-06 ASSESSMENT — PATIENT HEALTH QUESTIONNAIRE - PHQ9: SUM OF ALL RESPONSES TO PHQ QUESTIONS 1-9: 5

## 2023-12-06 NOTE — GROUP NOTE
Psychoeducation Group Note    PATIENT'S NAME: Angi Canales  MRN:   [id number]  :   1965  ACCT. NUMBER: [id number]  DATE OF SERVICE: 23  START TIME: 10:00 AM  END TIME: 10:50 AM  FACILITATOR: Diana Sykes LGSW  TOPIC: MH Wellness Group: Mental Health Maintenance  [facility]  TRACK: 55+ IOP 5    NUMBER OF PARTICIPANTS: 8    Summary of Group / Topics Discussed:  Mental Health Maintenance:  Stigma: In this group patients explored stigma surrounding a mental health diagnosis.  The group discussed the way stigma impacts their own life, and discussed strategies to reduce. The relationship between physical and mental health were also explored in the context of healthcare access, treatment, and support.    Patient Session Goals / Objectives:  Patients identified the importance of practicing emotional hygiene  Patients identified ways to decrease the  impact of stigma in their own life      Patient Participation / Response:  Fully participated with the group by sharing personal reflections / insights and openly received / provided feedback with other participants.    Demonstrated understanding of topics discussed through group discussion and participation, Identified / Expressed personal readiness to practice skills, and Verbalized understanding of mental health maintenance topic    Treatment Plan:  Patient has See Epic Treatment Plan - Patient is discharging.    CIARA Orosco

## 2023-12-06 NOTE — GROUP NOTE
Psychotherapy Group Note    PATIENT'S NAME: Angi Canales  MRN:   [id number]  :   1965  ACCT. NUMBER: [id number]  DATE OF SERVICE: 23  START TIME: 11:00 AM  END TIME: 11:50 AM  FACILITATOR: Liza Mai LICSW  TOPIC: MH EBP Group: Self-Awareness  [facility]  TRACK: IOP 5    NUMBER OF PARTICIPANTS: 7    Summary of Group / Topics Discussed:  Self-Awareness: Personal Strengths: Topic focused on assisting patients in identifying personal strengths and how they relate to the management of mental health symptoms. Patients discussed the benefits of acknowledging their personal strengths and their impact on mood improvement, mindfulness, and perspective. Patients worked to increase time spent on recognition and appreciation of what is positive and working in their lives. The goal is to reduce rumination and negative thinking resulting in increased mindfulness and resilience. Patients will work to put skills into practice and problem-solve barriers.     Patient Session Goals / Objectives:  Identified personal strengths  Identified barriers to recognition of personal strengths  Verbalized understanding of strategies to increase use of their strengths in management of daily symptoms  Participate in identifying Looking Back and Looking Forward Mindful Exercise      Patient Participation / Response:  Fully participated with the group by sharing personal reflections / insights and openly received / provided feedback with other participants.    Demonstrated understanding of topics discussed through group discussion and participation and Practiced skills in session    Treatment Plan:  Patient has a current master individualized treatment plan.  See Epic treatment plan for more information.    RAYMUNDO Headley

## 2023-12-06 NOTE — GROUP NOTE
"Process Group Note    PATIENT'S NAME: Angi Canales  MRN:   [id number]  :   1965  ACCT. NUMBER: [id number]  DATE OF SERVICE: 23  START TIME:  9:00 AM  END TIME:  9:50 AM  FACILITATOR: Liza Mai LICSW  TOPIC:  Process Group    Diagnoses:  296.23 (F32.2) Major Depressive Disorder, Single Episode, Severe _ and With melancholic features  300.02 (F41.1) Generalized Anxiety Disorder.  300.01 (F41.0) Panic Disorder    [facility]  TRACK: IOP 5    NUMBER OF PARTICIPANTS: 8        Data:    Session content: At the start of this group, patients were invited to check in by identifying themselves, describing their current emotional status, and identifying issues to address in this group.   Area(s) of treatment focus addressed in this session included Symptom Management, Personal Safety, Community Resources/Discharge Planning, Abstinence/Relapse Prevention, Develop / Improve Independent Living Skills, and Develop Socialization / Interpersonal Relationship Skills.    Angi reports increased mood stabilization. She reports increase capacity to transition back to work on 2023. She reports stable mood. Denies S/I or safety issues.  She has a relapse prevention plan with community resources. She will consult with Cynthia Luo at [facility] for medication management and Myla Guevara for individual therapy.  Angi [facility] also [facility]. Angi completed IOP today and stated \"this was an opportunity of a lifetime.\" She has met her treatment plan goals.      Therapeutic Interventions/Treatment Strategies:  Psychotherapist offered support, feedback and validation and reinforced use of skills. Treatment modalities used include Cognitive Behavioral Therapy.    Assessment:    Patient response:   Patient responded to session by accepting feedback, giving feedback, listening, focusing on goals, being attentive, and verbalizing " understanding    Possible barriers to participation / learning include: and no barriers identified    Health Issues:   None reported. Reports medication compliance.       Substance Use Review:   Substance Use: No active concerns identified.    Mental Status/Behavioral Observations  Appearance:   Appropriate   Eye Contact:   Good   Psychomotor Behavior: Normal   Attitude:   Cooperative  Interested  Orientation:   All  Speech   Rate / Production: Normal    Volume:  Normal   Mood:    Normal  Affect:    Appropriate   Thought Content:   Clear and Safety denies any current safety concerns including suicidal ideation, self-harm, and homicidal ideation  Thought Form:  Coherent  Goal Directed  Logical     Insight:    Good     Plan:   Safety Plan: No current safety concerns identified.  Recommended that patient call 911 or go to the local ED should there be a change in any of these risk factors.   Barriers to treatment: None identified  Patient Contracts (see media tab):  None  Substance Use: Not addressed in session   Continue or Discharge: Patient is being discharged today. See Treatment Plan and Discharge Summary.       Liza Mai, [facility]  December 6, 2023

## 2023-12-13 ENCOUNTER — TELEPHONE (OUTPATIENT)
Dept: PEDIATRICS | Facility: CLINIC | Age: 58
End: 2023-12-13
Payer: COMMERCIAL

## 2023-12-13 NOTE — TELEPHONE ENCOUNTER
Pt called to ask if she is able to come to appointment tomorrow if she is not feeling well, has not tested for COVID. RN advised if patient is not feeling well that she please wear a mask. If she tests positive for COVID to call back.     Patient was given an opportunity to ask questions, verbalized understanding of plan, and is agreeable.    Ericka TALLEY RN on 12/13/2023 at 2:35 PM

## 2023-12-14 ENCOUNTER — OFFICE VISIT (OUTPATIENT)
Dept: PEDIATRICS | Facility: CLINIC | Age: 58
End: 2023-12-14
Payer: COMMERCIAL

## 2023-12-14 VITALS
OXYGEN SATURATION: 95 % | WEIGHT: 168.6 LBS | HEIGHT: 68 IN | DIASTOLIC BLOOD PRESSURE: 81 MMHG | BODY MASS INDEX: 25.55 KG/M2 | HEART RATE: 96 BPM | TEMPERATURE: 99.4 F | RESPIRATION RATE: 16 BRPM | SYSTOLIC BLOOD PRESSURE: 118 MMHG

## 2023-12-14 DIAGNOSIS — Z23 NEED FOR PROPHYLACTIC VACCINATION AND INOCULATION AGAINST INFLUENZA: ICD-10-CM

## 2023-12-14 DIAGNOSIS — R15.2 RECTAL URGENCY: ICD-10-CM

## 2023-12-14 DIAGNOSIS — Z12.31 VISIT FOR SCREENING MAMMOGRAM: ICD-10-CM

## 2023-12-14 DIAGNOSIS — F41.9 ANXIETY: ICD-10-CM

## 2023-12-14 DIAGNOSIS — K62.5 RECTAL BLEEDING: ICD-10-CM

## 2023-12-14 DIAGNOSIS — F32.2 MDD (MAJOR DEPRESSIVE DISORDER), SINGLE EPISODE, SEVERE (H): ICD-10-CM

## 2023-12-14 DIAGNOSIS — Z12.11 SCREEN FOR COLON CANCER: ICD-10-CM

## 2023-12-14 DIAGNOSIS — Z00.00 ROUTINE GENERAL MEDICAL EXAMINATION AT A HEALTH CARE FACILITY: Primary | ICD-10-CM

## 2023-12-14 DIAGNOSIS — Z98.82 HISTORY OF BILATERAL BREAST IMPLANTS: ICD-10-CM

## 2023-12-14 PROCEDURE — 99396 PREV VISIT EST AGE 40-64: CPT | Mod: 25 | Performed by: INTERNAL MEDICINE

## 2023-12-14 PROCEDURE — 90471 IMMUNIZATION ADMIN: CPT | Performed by: INTERNAL MEDICINE

## 2023-12-14 PROCEDURE — 90686 IIV4 VACC NO PRSV 0.5 ML IM: CPT | Performed by: INTERNAL MEDICINE

## 2023-12-14 PROCEDURE — 91320 SARSCV2 VAC 30MCG TRS-SUC IM: CPT | Performed by: INTERNAL MEDICINE

## 2023-12-14 PROCEDURE — 90480 ADMN SARSCOV2 VAC 1/ONLY CMP: CPT | Performed by: INTERNAL MEDICINE

## 2023-12-14 ASSESSMENT — ENCOUNTER SYMPTOMS
COUGH: 0
HEARTBURN: 0
SORE THROAT: 0
HEADACHES: 0
FEVER: 1
PARESTHESIAS: 0
WEAKNESS: 0
PALPITATIONS: 0
DIARRHEA: 0
NERVOUS/ANXIOUS: 0
DYSURIA: 0
EYE PAIN: 0
ABDOMINAL PAIN: 0
HEMATOCHEZIA: 0
CONSTIPATION: 0
SHORTNESS OF BREATH: 0
JOINT SWELLING: 0
BREAST MASS: 0
MYALGIAS: 0
DIZZINESS: 0
CHILLS: 0
HEMATURIA: 0
ARTHRALGIAS: 0
FREQUENCY: 0
NAUSEA: 0

## 2023-12-14 ASSESSMENT — PAIN SCALES - GENERAL: PAINLEVEL: NO PAIN (0)

## 2023-12-14 NOTE — PROGRESS NOTES
SUBJECTIVE:   Angi is a 58 year old, presenting for the following:  Physical (Non fasting ) and Throat Problem (X yesterday , fever last night, bump on neck throat. Hurts to swallow )        12/14/2023    10:53 AM   Additional Questions   Roomed by JENNIFER Capps   Accompanied by BHAVESH         12/14/2023    10:53 AM   Patient Reported Additional Medications   Patient reports taking the following new medications NA       Healthy Habits:     Getting at least 3 servings of Calcium per day:  Yes    Bi-annual eye exam:  Yes    Dental care twice a year:  Yes    Sleep apnea or symptoms of sleep apnea:  Sleep apnea    Diet:  Regular (no restrictions)    Frequency of exercise:  1 day/week    Duration of exercise:  Less than 15 minutes    Taking medications regularly:  Yes    Medication side effects:  None    Additional concerns today:  No                  Have you ever done Advance Care Planning? (For example, a Health Directive, POLST, or a discussion with a medical provider or your loved ones about your wishes): No, advance care planning information given to patient to review.  Patient plans to discuss their wishes with loved ones or provider.      Social History     Tobacco Use    Smoking status: Never    Smokeless tobacco: Never   Substance Use Topics    Alcohol use: Yes     Comment: 1/every two weeks             12/14/2023    10:45 AM   Alcohol Use   Prescreen: >3 drinks/day or >7 drinks/week? No     Reviewed orders with patient.  Reviewed health maintenance and updated orders accordingly - Yes  Labs reviewed in EPIC    Breast Cancer Screening:    FHS-7:       12/14/2023    10:47 AM   Breast CA Risk Assessment (FHS-7)   Did any of your first-degree relatives have breast or ovarian cancer? No   Did any of your relatives have bilateral breast cancer? No   Did any man in your family have breast cancer? No   Did any woman in your family have breast and ovarian cancer? No   Did any woman in your family have breast cancer  "before age 50 y? No   Do you have 2 or more relatives with breast and/or ovarian cancer? No   Do you have 2 or more relatives with breast and/or bowel cancer? No     click delete button to remove this line now  Mammogram Screening: Recommended mammography every 1-2 years with patient discussion and risk factor consideration  Pertinent mammograms are reviewed under the imaging tab.    History of abnormal Pap smear: Status post benign hysterectomy. Health Maintenance and Surgical History updated.      2/22/2013     3:07 PM   PAP / HPV   PAP (Historical) NIL      Reviewed and updated as needed this visit by clinical staff   Tobacco  Allergies  Meds  Problems  Med Hx            Reviewed and updated as needed this visit by Provider                     Review of Systems   Constitutional:  Positive for fever. Negative for chills.   HENT:  Negative for congestion, ear pain, hearing loss and sore throat.    Eyes:  Negative for pain and visual disturbance.   Respiratory:  Negative for cough and shortness of breath.    Cardiovascular:  Negative for chest pain, palpitations and peripheral edema.   Gastrointestinal:  Negative for abdominal pain, constipation, diarrhea, heartburn, hematochezia and nausea.   Breasts:  Negative for tenderness, breast mass and discharge.   Genitourinary:  Negative for dysuria, frequency, genital sores, hematuria, pelvic pain, urgency, vaginal bleeding and vaginal discharge.   Musculoskeletal:  Negative for arthralgias, joint swelling and myalgias.   Skin:  Negative for rash.   Neurological:  Negative for dizziness, weakness, headaches and paresthesias.   Psychiatric/Behavioral:  Negative for mood changes. The patient is not nervous/anxious.           OBJECTIVE:   /81   Pulse 96   Temp 99.4  F (37.4  C) (Oral)   Resp 16   Ht 1.727 m (5' 8\")   Wt 76.5 kg (168 lb 9.6 oz)   SpO2 95%   BMI 25.64 kg/m    Physical Exam  GENERAL: healthy, alert and no distress  EYES: Eyes grossly normal to " "inspection, PERRL and conjunctivae and sclerae normal  HENT: ear canals and TM's normal, nose and mouth without ulcers or lesions  NECK: no adenopathy, no asymmetry, masses, or scars and thyroid normal to palpation  RESP: lungs clear to auscultation - no rales, rhonchi or wheezes  CV: regular rate and rhythm, normal S1 S2, no S3 or S4, no murmur, click or rub, no peripheral edema and peripheral pulses strong  ABDOMEN: soft, nontender, no hepatosplenomegaly, no masses and bowel sounds normal  MS: no gross musculoskeletal defects noted, no edema  SKIN: no suspicious lesions or rashes  NEURO: Normal strength and tone, mentation intact and speech normal  PSYCH: mentation appears normal, affect normal/bright    Diagnostic Test Results:  Labs reviewed in Epic    ASSESSMENT/PLAN:   1. Routine general medical examination at a health care facility  Updated chart and preventive health.  COVID and flu today.  Will schedule shingles later.  Will find last Tdap vaccine and update.    Had full panel of labs in May 2023.  Will defer preventive labs to next annual physical.    2. MDD (major depressive disorder), single episode, severe (H)  3. Anxiety  Improved significantly s/p course of IOP.  Took time off work with FMLA.  Going back on Monday.  Sees individual psychotherapist now.  Sees psychiatrist who manages meds (bupropion, venlafaxine, prn ativan - rarely)    4. Screen for colon cancer  - Colonoscopy Screening  Referral; Future    5. Visit for screening mammogram  - MA Screen Bilateral w/Guy; Future    6. History of bilateral breast implants  - MA Screen Bilateral w/Guy; Future    7. Need for prophylactic vaccination and inoculation against influenza  Flu and covid today            COUNSELING:  Reviewed preventive health counseling, as reflected in patient instructions      BMI:   Estimated body mass index is 25.64 kg/m  as calculated from the following:    Height as of this encounter: 1.727 m (5' 8\").    Weight " as of this encounter: 76.5 kg (168 lb 9.6 oz).         She reports that she has never smoked. She has never used smokeless tobacco.          Rashmi Miramontes MD  [facility]

## 2023-12-14 NOTE — PATIENT INSTRUCTIONS
Check when your last tetanus (Tdap) was given.  Schedule first shingles vaccine at the pharmacy at your convenience.  Will give COVID and flu vaccines today.    Preventive Health Recommendations  Female Ages 50 - 64    Yearly exam: See your health care provider every year in order to  Review health changes.   Discuss preventive care.    Review your medicines if your doctor has prescribed any.    Get a Pap test every three years (unless you have an abnormal result and your provider advises testing more often).  If you get Pap tests with HPV test, you only need to test every 5 years, unless you have an abnormal result.   You do not need a Pap test if your uterus was removed (hysterectomy) and you have not had cancer.  You should be tested each year for STDs (sexually transmitted diseases) if you're at risk.   Have a mammogram every 1 to 2 years.  Have a colonoscopy at age 45, or have a yearly FIT test (stool test). These exams screen for colon cancer.    Have a cholesterol test every 5 years, or more often if advised.  Have a diabetes test (fasting glucose) every three years. If you are at risk for diabetes, you should have this test more often.   If you are at risk for osteoporosis (brittle bone disease), think about having a bone density scan (DEXA).    Shots: Get a flu shot each year. Get a tetanus shot every 10 years.    Nutrition:   Eat at least 5 servings of fruits and vegetables each day.  Eat whole-grain bread, whole-wheat pasta and brown rice instead of white grains and rice.  Get adequate Calcium and Vitamin D.     Lifestyle  Exercise at least 150 minutes a week (30 minutes a day, 5 days a week). This will help you control your weight and prevent disease.  Limit alcohol to one drink per day.  No smoking.   Wear sunscreen to prevent skin cancer.   See your dentist every six months for an exam and cleaning.  See your eye doctor every 1 to 2 years.

## 2023-12-21 ENCOUNTER — TELEPHONE (OUTPATIENT)
Dept: GASTROENTEROLOGY | Facility: CLINIC | Age: 58
End: 2023-12-21
Payer: COMMERCIAL

## 2023-12-21 ENCOUNTER — HOSPITAL ENCOUNTER (OUTPATIENT)
Facility: CLINIC | Age: 58
End: 2023-12-21
Payer: COMMERCIAL

## 2023-12-21 NOTE — TELEPHONE ENCOUNTER
"Endoscopy Scheduling Screen    Have you had a positive Covid test in the last 14 days?  No    Are you active on MyChart?   Yes    What insurance is in the chart?  Other:  [employer]    Ordering/Referring Provider:   AMBROSE YAN MAI        (If ordering provider performs procedure, schedule with ordering provider unless otherwise instructed. )    BMI: Estimated body mass index is 25.64 kg/m  as calculated from the following:    Height as of 12/14/23: 1.727 m (5' 8\").    Weight as of 12/14/23: 76.5 kg (168 lb 9.6 oz).     Sedation Ordered  moderate sedation.   If patient BMI > 50 do not schedule in ASC.    If patient BMI > 45 do not schedule at [facility].    Are you taking methadone or Suboxone?  No    Are you taking any prescription medications for pain 3 or more times per week?   NO - No RN review required.    Do you have a history of malignant hyperthermia or adverse reaction to anesthesia?  No    (Females) Are you currently pregnant?   No     Have you been diagnosed or told you have pulmonary hypertension?   No    Do you have an LVAD?  No    Have you been told you have moderate to severe sleep apnea?  Yes (RN Review required for scheduling unless scheduling in Hospital.)    Have you been told you have COPD, asthma, or any other lung disease?  No    Do you have any heart conditions?  No     Have you ever had an organ transplant?   No    Have you ever had or are you awaiting a heart or lung transplant?   No    Have you had a stroke or transient ischemic attack (TIA aka \"mini stroke\" in the last 6 months?   No    Have you been diagnosed with or been told you have cirrhosis of the liver?   No    Are you currently on dialysis?   No    Do you need assistance transferring?   No    BMI: Estimated body mass index is 25.64 kg/m  as calculated from the following:    Height as of 12/14/23: 1.727 m (5' 8\").    Weight as of 12/14/23: 76.5 kg (168 lb 9.6 oz).     Is patients BMI > 40 and scheduling location [facility]?  No    Do you take an " injectable medication for weight loss or diabetes (excluding insulin)?  No    Do you take the medication Naltrexone?  No    Do you take blood thinners?  No       Prep   Are you currently on dialysis or do you have chronic kidney disease?  No    Do you have a diagnosis of diabetes?  No    Do you have a diagnosis of cystic fibrosis (CF)?  No    On a regular basis do you go 3 -5 days between bowel movements?  No    BMI > 40?  No    Preferred Pharmacy:    [id number] - [place] - [address]  [facility]  [address] [place] [address]  Phone: [phone] Fax: [phone]      Final Scheduling Details   Colonoscopy prep sent?  Standard MiraLAX    Procedure scheduled  Colonoscopy    Surgeon:  DENISHA     Date of procedure:  01/24/2024     Pre-OP / PAC:   No - Not required for this site.    Location  [facility] - Patient preference.    Sedation   Moderate Sedation - Per order.      Patient Reminders:   You will receive a call from a Nurse to review instructions and health history.  This assessment must be completed prior to your procedure.  Failure to complete the Nurse assessment may result in the procedure being cancelled.      On the day of your procedure, please designate an adult(s) who can drive you home stay with you for the next 24 hours. The medicines used in the exam will make you sleepy. You will not be able to drive.      You cannot take public transportation, ride share services, or non-medical taxi service without a responsible caregiver.  Medical transport services are allowed with the requirement that a responsible caregiver will receive you at your destination.  We require that drivers and caregivers are confirmed prior to your procedure.

## 2023-12-28 ENCOUNTER — MYC MEDICAL ADVICE (OUTPATIENT)
Dept: PEDIATRICS | Facility: CLINIC | Age: 58
End: 2023-12-28
Payer: COMMERCIAL

## 2023-12-28 ENCOUNTER — TELEPHONE (OUTPATIENT)
Dept: GASTROENTEROLOGY | Facility: CLINIC | Age: 58
End: 2023-12-28
Payer: COMMERCIAL

## 2023-12-28 NOTE — TELEPHONE ENCOUNTER
Hold for PCP review as she recently saw the patient and she is back in clinic before the colonoscopy appointment.

## 2023-12-28 NOTE — TELEPHONE ENCOUNTER
Pre assessment completed for upcoming procedure.      Procedure details:    Patient scheduled for Colonoscopy  on 1/10/24.     Arrival time: 1345. Procedure time 1430    Pre op exam needed? N/A    Facility location: [facility]; [address] [place] [address] [place] [address]    Sedation type: Conscious sedation     Indication for procedure: screening     COVID policy reviewed.    Designated  policy reviewed. Instructed to have someone stay 6 hours post procedure.       Chart review:     Electronic implanted devices? No    Recent diagnosis of diverticulitis within the last 6 weeks?  No    Diabetic? No    Medication review:    Anticoagulants? No    NSAIDS? No    Other medication HOLDING recommendations:  N/A      Prep for procedure:     Bowel prep recommendation: Standard Miralax  Due to: standard bowel prep.    Prep instructions sent via [employer]     Reviewed procedure prep instructions.     Patient verbalized understanding and had no questions or concerns at this time.        Cherri Fernandez RN  Endoscopy Procedure Pre Assessment RN  [phone] option 4

## 2023-12-28 NOTE — TELEPHONE ENCOUNTER
Caller: Angi Canales   Reason for Reschedule/Cancellation (please be detailed, any staff messages or encounters to note?): on vacation, but would like sooner as she has some rectal bleeding going on. She will have PCP updated orders      Prior to reschedule please review:  Ordering Provider: Manpreet Olivares  Sedation per order: moderate  Does patient have any ASC Exclusions, please identify?: n      Notes on Cancelled Procedure:  Procedure: Lower Endoscopy [Colonoscopy]   Date: 1/24  Location: [facility]; [address] [place] [address]  Surgeon: Fidel      Rescheduled: Yes  Procedure: Lower Endoscopy [Colonoscopy]   Date: 1/10  Location: [facility]; [address] [place] [address]  Surgeon: Bhavani  Sedation Level Scheduled  moderate,  Reason for Sedation Level ordered  Prep/Instructions updated and sent: y       Send In - basket message to Panc - Randolph Pool if [facility]  procedure is canceled or rescheduled: [ N/A, YES or NO] n

## 2024-01-04 NOTE — TELEPHONE ENCOUNTER
Please call pt - the new order is a diagnostic colonoscopy.  I agree this needs to be ordered to assess these concerns but she should know that it is billed differently from an insurance perspective.    Rashmi Miramontes MD

## 2024-01-10 ENCOUNTER — HOSPITAL ENCOUNTER (OUTPATIENT)
Facility: CLINIC | Age: 59
Discharge: HOME | End: 2024-01-10
Attending: COLON & RECTAL SURGERY | Admitting: COLON & RECTAL SURGERY
Payer: COMMERCIAL

## 2024-01-10 VITALS
OXYGEN SATURATION: 99 % | BODY MASS INDEX: 25.46 KG/M2 | DIASTOLIC BLOOD PRESSURE: 78 MMHG | RESPIRATION RATE: 16 BRPM | SYSTOLIC BLOOD PRESSURE: 127 MMHG | HEIGHT: 68 IN | WEIGHT: 168 LBS | HEART RATE: 72 BPM

## 2024-01-10 LAB — COLONOSCOPY: NORMAL

## 2024-01-10 PROCEDURE — 250N000011 HC RX IP 250 OP 636: Performed by: COLON & RECTAL SURGERY

## 2024-01-10 PROCEDURE — 258N000003 HC RX IP 258 OP 636: Performed by: COLON & RECTAL SURGERY

## 2024-01-10 PROCEDURE — G0500 MOD SEDAT ENDO SERVICE >5YRS: HCPCS | Performed by: COLON & RECTAL SURGERY

## 2024-01-10 PROCEDURE — 45385 COLONOSCOPY W/LESION REMOVAL: CPT | Performed by: COLON & RECTAL SURGERY

## 2024-01-10 PROCEDURE — 88305 TISSUE EXAM BY PATHOLOGIST: CPT | Mod: 26 | Performed by: PATHOLOGY

## 2024-01-10 PROCEDURE — 88305 TISSUE EXAM BY PATHOLOGIST: CPT | Mod: TC | Performed by: COLON & RECTAL SURGERY

## 2024-01-10 RX ORDER — ONDANSETRON 2 MG/ML
4 INJECTION INTRAMUSCULAR; INTRAVENOUS
Status: DISCONTINUED | OUTPATIENT
Start: 2024-01-10 | End: 2024-01-10 | Stop reason: HOSPADM

## 2024-01-10 RX ORDER — SODIUM CHLORIDE 9 MG/ML
INJECTION, SOLUTION INTRAVENOUS CONTINUOUS PRN
Status: DISCONTINUED | OUTPATIENT
Start: 2024-01-10 | End: 2024-01-10 | Stop reason: HOSPADM

## 2024-01-10 RX ORDER — LIDOCAINE 40 MG/G
CREAM TOPICAL
Status: DISCONTINUED | OUTPATIENT
Start: 2024-01-10 | End: 2024-01-10 | Stop reason: HOSPADM

## 2024-01-10 RX ORDER — FENTANYL CITRATE 50 UG/ML
INJECTION, SOLUTION INTRAMUSCULAR; INTRAVENOUS PRN
Status: DISCONTINUED | OUTPATIENT
Start: 2024-01-10 | End: 2024-01-10 | Stop reason: HOSPADM

## 2024-01-10 ASSESSMENT — ACTIVITIES OF DAILY LIVING (ADL): ADLS_ACUITY_SCORE: 35

## 2024-01-10 NOTE — H&P
Colon & Rectal Surgery History and Physical  Pre-Endoscopy Procedure Note    History of Present Illness   I have been asked by Dr. Zepeda to evaluate this 58 year old female for colorectal cancer screening. She currently denies any abdominal pain, weight loss, bleeding per rectum, or recent change in bowel habits.    Past Medical History  Diagnosis Date    Depressive disorder        Past Surgical History  Procedure Laterality Date    BREAST SURGERY  2009    Implants    COLONOSCOPY  03/06/2013    Procedure: COLONOSCOPY;;  Surgeon: Juan Daniel Sahu MD;  Location: [facility]    HYSTERECTOMY      TUBAL LIGATION          Medications  Medications Prior to Admission   Medication Sig    buPROPion (WELLBUTRIN XL) 150 MG 24 hr tablet Take 150 mg by mouth every morning    LORazepam (ATIVAN) 0.5 MG tablet     venlafaxine (EFFEXOR XR) 37.5 MG 24 hr capsule Take 75 mg by mouth daily       Allergies  Allergen Reactions    Morphine And Related     Penicillins     Sulfa Antibiotics         Family History   Family history includes Anxiety Disorder in her mother, paternal aunt, and paternal uncle; Cancer in her father and maternal grandfather; Cancer - colorectal in her paternal aunt and paternal uncle; Depression in her mother, paternal aunt, and paternal uncle; Substance Abuse in her brother, brother, and mother.     Social History   She reports that she has never smoked. She has never used smokeless tobacco. She reports current alcohol use. She reports that she does not use drugs.    Review of Systems   Constitutional:  No fever, weight change or fatigue.    Eyes:     No dry eyes or vision changes.   Ears/Nose/Throat/Neck:  No oral ulcers, sore throat or voice change.    Cardiovascular:   No palpitations, syncope, angina or edema.   Respiratory:    No chest pain, excessive sleepiness, shortness of breath or hemoptysis.    Gastrointestinal:   No abdominal pain, nausea, vomiting, diarrhea or heartburn.    Genitourinary:   No dysuria,  "hematuria, urinary retention or urinary frequency.   Musculoskeletal:  No joint swelling or arthralgias.    Dermatologic:  No skin rash or other skin changes.   Neurologic:    No focal weakness or numbness. No neuropathy.   Psychiatric:    No depression, anxiety, suicidal ideation, or paranoid ideation.   Endocrine:   No cold or heat intolerance, polydipsia, hirsutism, change in libido, or flushing.   Hematology/Lymphatic:  No bleeding or lymphadenopathy.    Allergy/Immunology:  No rhinitis or hives.     Physical Exam   Vitals:  Blood pressure 118/78, pulse 68, resp. rate 16, height 1.727 m (5' 8\"), weight 76.2 kg (168 lb), SpO2 98%.    General:  Alert and oriented to person, place and time   Airway: Normal oropharyngeal airway and neck mobility   Lungs:  Clear bilaterally   Heart:  Regular rate and rhythm   Abdomen: Soft, NT, ND, no masses   Extremities: Warm, good capillary refill    ASA Grade: II (mild systemic disease)    Impression: Cleared for use of conscious sedation for colorectal cancer screening    Plan: Proceed with colonoscopy     Demetrice Beckham MD  [facility]  [phone]  "

## 2024-01-11 LAB
PATH REPORT.COMMENTS IMP SPEC: NORMAL
PATH REPORT.COMMENTS IMP SPEC: NORMAL
PATH REPORT.FINAL DX SPEC: NORMAL
PATH REPORT.GROSS SPEC: NORMAL
PATH REPORT.MICROSCOPIC SPEC OTHER STN: NORMAL
PATH REPORT.RELEVANT HX SPEC: NORMAL
PHOTO IMAGE: NORMAL

## 2024-03-14 ENCOUNTER — APPOINTMENT (OUTPATIENT)
Dept: GENERAL RADIOLOGY | Facility: CLINIC | Age: 59
End: 2024-03-14
Attending: EMERGENCY MEDICINE
Payer: COMMERCIAL

## 2024-03-14 ENCOUNTER — HOSPITAL ENCOUNTER (EMERGENCY)
Facility: CLINIC | Age: 59
Discharge: HOME | End: 2024-03-14
Attending: EMERGENCY MEDICINE | Admitting: EMERGENCY MEDICINE
Payer: COMMERCIAL

## 2024-03-14 VITALS
RESPIRATION RATE: 11 BRPM | BODY MASS INDEX: 25.76 KG/M2 | HEART RATE: 58 BPM | HEIGHT: 68 IN | OXYGEN SATURATION: 97 % | SYSTOLIC BLOOD PRESSURE: 125 MMHG | TEMPERATURE: 98 F | WEIGHT: 170 LBS | DIASTOLIC BLOOD PRESSURE: 83 MMHG

## 2024-03-14 DIAGNOSIS — M54.6 ACUTE LEFT-SIDED THORACIC BACK PAIN: ICD-10-CM

## 2024-03-14 LAB
ANION GAP SERPL CALCULATED.3IONS-SCNC: 12 MMOL/L (ref 7–15)
ATRIAL RATE - MUSE: 71 BPM
BASOPHILS # BLD AUTO: 0 10E3/UL (ref 0–0.2)
BASOPHILS NFR BLD AUTO: 1 %
BUN SERPL-MCNC: 10.5 MG/DL (ref 8–23)
CALCIUM SERPL-MCNC: 9 MG/DL (ref 8.6–10)
CHLORIDE SERPL-SCNC: 104 MMOL/L (ref 98–107)
CREAT SERPL-MCNC: 0.83 MG/DL (ref 0.51–0.95)
D DIMER PPP FEU-MCNC: <0.27 UG/ML FEU (ref 0–0.5)
DEPRECATED HCO3 PLAS-SCNC: 24 MMOL/L (ref 22–29)
DIASTOLIC BLOOD PRESSURE - MUSE: NORMAL MMHG
EGFRCR SERPLBLD CKD-EPI 2021: 81 ML/MIN/1.73M2
EOSINOPHIL # BLD AUTO: 0.2 10E3/UL (ref 0–0.7)
EOSINOPHIL NFR BLD AUTO: 4 %
ERYTHROCYTE [DISTWIDTH] IN BLOOD BY AUTOMATED COUNT: 12.2 % (ref 10–15)
GLUCOSE SERPL-MCNC: 91 MG/DL (ref 70–99)
HCT VFR BLD AUTO: 37.4 % (ref 35–47)
HGB BLD-MCNC: 12.5 G/DL (ref 11.7–15.7)
HOLD SPECIMEN: NORMAL
IMM GRANULOCYTES # BLD: 0 10E3/UL
IMM GRANULOCYTES NFR BLD: 0 %
INTERPRETATION ECG - MUSE: NORMAL
LYMPHOCYTES # BLD AUTO: 2.5 10E3/UL (ref 0.8–5.3)
LYMPHOCYTES NFR BLD AUTO: 49 %
MCH RBC QN AUTO: 32.6 PG (ref 26.5–33)
MCHC RBC AUTO-ENTMCNC: 33.4 G/DL (ref 31.5–36.5)
MCV RBC AUTO: 98 FL (ref 78–100)
MONOCYTES # BLD AUTO: 0.5 10E3/UL (ref 0–1.3)
MONOCYTES NFR BLD AUTO: 9 %
NEUTROPHILS # BLD AUTO: 1.9 10E3/UL (ref 1.6–8.3)
NEUTROPHILS NFR BLD AUTO: 37 %
NRBC # BLD AUTO: 0 10E3/UL
NRBC BLD AUTO-RTO: 0 /100
P AXIS - MUSE: 57 DEGREES
PLATELET # BLD AUTO: 240 10E3/UL (ref 150–450)
POTASSIUM SERPL-SCNC: 4 MMOL/L (ref 3.4–5.3)
PR INTERVAL - MUSE: 162 MS
QRS DURATION - MUSE: 82 MS
QT - MUSE: 392 MS
QTC - MUSE: 425 MS
R AXIS - MUSE: 15 DEGREES
RBC # BLD AUTO: 3.83 10E6/UL (ref 3.8–5.2)
SODIUM SERPL-SCNC: 140 MMOL/L (ref 135–145)
SYSTOLIC BLOOD PRESSURE - MUSE: NORMAL MMHG
T AXIS - MUSE: 51 DEGREES
TROPONIN T SERPL HS-MCNC: <6 NG/L
VENTRICULAR RATE- MUSE: 71 BPM
WBC # BLD AUTO: 5 10E3/UL (ref 4–11)

## 2024-03-14 PROCEDURE — 250N000013 HC RX MED GY IP 250 OP 250 PS 637: Performed by: EMERGENCY MEDICINE

## 2024-03-14 PROCEDURE — 85025 COMPLETE CBC W/AUTO DIFF WBC: CPT | Performed by: EMERGENCY MEDICINE

## 2024-03-14 PROCEDURE — 99285 EMERGENCY DEPT VISIT HI MDM: CPT | Mod: 25

## 2024-03-14 PROCEDURE — 93005 ELECTROCARDIOGRAM TRACING: CPT

## 2024-03-14 PROCEDURE — 36415 COLL VENOUS BLD VENIPUNCTURE: CPT | Performed by: EMERGENCY MEDICINE

## 2024-03-14 PROCEDURE — 84484 ASSAY OF TROPONIN QUANT: CPT | Performed by: EMERGENCY MEDICINE

## 2024-03-14 PROCEDURE — 71046 X-RAY EXAM CHEST 2 VIEWS: CPT

## 2024-03-14 PROCEDURE — 80048 BASIC METABOLIC PNL TOTAL CA: CPT | Performed by: EMERGENCY MEDICINE

## 2024-03-14 PROCEDURE — 85379 FIBRIN DEGRADATION QUANT: CPT | Performed by: EMERGENCY MEDICINE

## 2024-03-14 RX ORDER — ONDANSETRON 2 MG/ML
4 INJECTION INTRAMUSCULAR; INTRAVENOUS EVERY 30 MIN PRN
Status: DISCONTINUED | OUTPATIENT
Start: 2024-03-14 | End: 2024-03-14 | Stop reason: HOSPADM

## 2024-03-14 RX ORDER — IBUPROFEN 600 MG/1
600 TABLET, FILM COATED ORAL ONCE
Status: COMPLETED | OUTPATIENT
Start: 2024-03-14 | End: 2024-03-14

## 2024-03-14 RX ADMIN — IBUPROFEN 600 MG: 600 TABLET ORAL at 06:44

## 2024-03-14 ASSESSMENT — COLUMBIA-SUICIDE SEVERITY RATING SCALE - C-SSRS
6. HAVE YOU EVER DONE ANYTHING, STARTED TO DO ANYTHING, OR PREPARED TO DO ANYTHING TO END YOUR LIFE?: NO
2. HAVE YOU ACTUALLY HAD ANY THOUGHTS OF KILLING YOURSELF IN THE PAST MONTH?: NO
1. IN THE PAST MONTH, HAVE YOU WISHED YOU WERE DEAD OR WISHED YOU COULD GO TO SLEEP AND NOT WAKE UP?: NO

## 2024-03-14 ASSESSMENT — ACTIVITIES OF DAILY LIVING (ADL)
ADLS_ACUITY_SCORE: 35
ADLS_ACUITY_SCORE: 35

## 2024-03-14 NOTE — ED TRIAGE NOTES
States pain in L scapula/shoulder for past 3 days 5/10 and yesterday felt foggy, nauseated, lightheaded and had sit down. Some nausea.     Triage Assessment (Adult)       Row Name 03/14/24 0513          Triage Assessment    Airway WDL WDL        Respiratory WDL    Respiratory WDL WDL        Skin Circulation/Temperature WDL    Skin Circulation/Temperature WDL WDL        Cardiac WDL    Cardiac WDL X;chest pain        Chest Pain Assessment    Chest Pain Location shoulder, left        Peripheral/Neurovascular WDL    Peripheral Neurovascular WDL WDL        Cognitive/Neuro/Behavioral WDL    Cognitive/Neuro/Behavioral WDL WDL

## 2024-03-14 NOTE — ED PROVIDER NOTES
"    History     Chief Complaint:  Chest Pain and Shoulder Pain       HPI   Angi Canales is a 58 year old female who presents for evaluation of chest pain and shoulder pain. The patient states for the last three days she has been experiencing pain in her posterior left shoulder. She describes the pain to be a 5-7/10 with constant throbbing inside the shoulder. She hasn't been able to get a lot of sleep due to the pain. She adds yesterday feeling foggy, dizzy, and nauseous. She reports no trauma, injury, or fall to cause her shoulder pain. Denies cough, fever, abdominal pain, recreation with movement, and taking pain medication. Denies history of hormone therapy.      Independent Historian:    None - Patient    Review of External Notes:    Medications:    Celexa     Past Medical History:    Anxiety  Insomnia  Depression    Past Surgical History:    Hysterectomy  Augmentation mammaplasty  Gyn surgery  Myra teeth extraction  Colonoscopy       Physical Exam   Patient Vitals for the past 24 hrs:   BP Temp Temp src Pulse Resp SpO2 Height Weight   03/14/24 0700 125/83 -- -- 58 11 -- -- --   03/14/24 0600 118/76 -- -- 66 15 -- -- --   03/14/24 0530 139/87 -- -- 69 17 -- -- --   03/14/24 0517 136/86 98  F (36.7  C) Oral 71 18 97 % 1.727 m (5' 8\") 77.1 kg (170 lb)        Physical Exam  General: Alert and cooperative with exam. Patient in mild distress. Normal mentation.  Head:  Scalp is NC/AT  Eyes:  No scleral icterus, PERRL  ENT:  The external nose and ears are normal. The oropharynx is normal and without erythema; mucus membranes are moist. Uvula midline, no evidence of deep space infection.  Neck:  Normal range of motion without rigidity.  CV:  Regular rate and rhythm    No pathologic murmur   Resp:  Breath sounds are clear bilaterally    Non-labored, no retractions or accessory muscle use  GI:  Abdomen is soft, no distension, no tenderness. No peritoneal signs  MS:  No lower extremity edema   Skin:  Warm and dry, No " rash or lesions noted. No thoracic tenderness or overlying skin change.  Neuro: Oriented x 3. No gross motor deficits.      Emergency Department Course   ECG  ECG taken at 0507, ECG read at 0610  Normal sinus rhythm   Rate 71 bpm. MD interval 162 ms. QRS duration 82 ms. QT/QTc 392/425 ms. P-R-T axes 57 15 51.    Imaging:  Chest XR,  PA & LAT   Final Result   IMPRESSION: Patchy opacities at the left lung base could represent   airspace disease or atelectasis. Right lung is clear. Normal cardiac   silhouette. No effusions or pneumothorax.      LINDA CAMERON MD            SYSTEM ID:  [id number]          Laboratory:  Labs Ordered and Resulted from Time of ED Arrival to Time of ED Departure   BASIC METABOLIC PANEL - Normal       Result Value    Sodium 140      Potassium 4.0      Chloride 104      Carbon Dioxide (CO2) 24      Anion Gap 12      Urea Nitrogen 10.5      Creatinine 0.83      GFR Estimate 81      Calcium 9.0      Glucose 91     TROPONIN T, HIGH SENSITIVITY - Normal    Troponin T, High Sensitivity <6     D DIMER QUANTITATIVE - Normal    D-Dimer Quantitative <0.27     CBC WITH PLATELETS AND DIFFERENTIAL    WBC Count 5.0      RBC Count 3.83      Hemoglobin 12.5      Hematocrit 37.4      MCV 98      MCH 32.6      MCHC 33.4      RDW 12.2      Platelet Count 240      % Neutrophils 37      % Lymphocytes 49      % Monocytes 9      % Eosinophils 4      % Basophils 1      % Immature Granulocytes 0      NRBCs per 100 WBC 0      Absolute Neutrophils 1.9      Absolute Lymphocytes 2.5      Absolute Monocytes 0.5      Absolute Eosinophils 0.2      Absolute Basophils 0.0      Absolute Immature Granulocytes 0.0      Absolute NRBCs 0.0          Emergency Department Course & Assessments:    Interventions:  Medications   ondansetron (ZOFRAN) injection 4 mg (has no administration in time range)   ibuprofen (ADVIL/MOTRIN) tablet 600 mg (600 mg Oral $Given 3/14/24 8281)        Assessments:  0628 I obtained history and performed physical  exam as noted above.     Independent Interpretation (X-rays, CTs, rhythm strip):  Chest x-ray: No PTX, affusion, or concerning infiltrate      Consultations/Discussion of Management or Tests:  None       Social Determinants of Health affecting care:  None     Disposition:  The patient was discharged.    Impression & Plan        Medical Decision Making:  Angi Canales is a 58 year old female who presents with atraumatic left-sided thoracic/shoulder pain.  The work up in the Emergency Department is negative.  I considered a broad differential diagnosis in this patient including life-threatening etiologies such as acute coronary syndrome, myocardial infarction, pulmonary embolism, acute aortic dissection, myocarditis, pericarditis, acute valvular insufficiency amongst others.  Other causes considered for this patient included pneumonia, pneumothorax, musculoskeletal pain, pericarditis, pleurisy, esophageal spasm, etc.  No serious etiology for the chest pain were detected today during this visit; likely muscular.  Close follow up with primary care is indicated should the pain continue, as further work up may be performed; this was made clear to the patient, who understands.  Return precautions discussed.  Recommended supportive care including NSAIDs/APAP, icing, and lidocaine patches.    Diagnosis:    ICD-10-CM    1. Acute left-sided thoracic back pain  M54.6           Scribe Disclosure:  Sophie LEUNG, am serving as a scribe at 7:34 AM on 3/14/2024 to document services personally performed by Vinod Martines DO based on my observations and the provider's statements to me.    3/14/2024   Vinod Martines Christopher Warren, DO  03/14/24 1638

## 2024-03-14 NOTE — DISCHARGE INSTRUCTIONS
Tylenol and/or ibuprofen as needed for symptom relief    May also consider icing 20 minutes, 4 times per day as needed and over-the-counter lidocaine patches as discussed

## 2024-03-15 ENCOUNTER — NURSE TRIAGE (OUTPATIENT)
Dept: PEDIATRICS | Facility: CLINIC | Age: 59
End: 2024-03-15

## 2024-03-15 PROBLEM — R41.89 COGNITIVE DECLINE: Status: ACTIVE | Noted: 2023-05-10

## 2024-03-15 PROBLEM — A09 INFECTIOUS DIARRHEA: Status: ACTIVE | Noted: 2022-03-30

## 2024-03-15 PROBLEM — M70.62 TROCHANTERIC BURSITIS OF LEFT HIP: Status: ACTIVE | Noted: 2023-05-10

## 2024-03-15 NOTE — TELEPHONE ENCOUNTER
"Call placed to pt to get more information    Appt made for 3/18/24 at 0840  Pt verbalized understanding and agrees to the plan    Larry Frazier RN on 3/15/2024 at 1:37 PM   Reason for Disposition   MODERATE pain (e.g., interferes with normal activities) and present > 3 days    Answer Assessment - Initial Assessment Questions  1. ONSET: \"When did the pain start?\"      March 11th  2. LOCATION: \"Where is the pain located?\"      Left shoulder between shoulder blade and spine  3. PAIN: \"How bad is the pain?\" (Scale 1-10; or mild, moderate, severe)    - MILD (1-3): doesn't interfere with normal activities    - MODERATE (4-7): interferes with normal activities (e.g., work or school) or awakens from sleep    - SEVERE (8-10): excruciating pain, unable to do any normal activities, unable to move arm at all due to pain      8/10 ibu takes some of the edge off. 5/10 when taking ibu  4. WORK OR EXERCISE: \"Has there been any recent work or exercise that involved this part of the body?\"      Moved a couch a week ago  5. CAUSE: \"What do you think is causing the shoulder pain?\"      See above  6. OTHER SYMPTOMS: \"Do you have any other symptoms?\" (e.g., neck pain, swelling, rash, fever, numbness, weakness)      no  7. PREGNANCY: \"Is there any chance you are pregnant?\" \"When was your last menstrual period?\"      No    Protocols used: Shoulder Pain-A-OH    "

## 2024-03-15 NOTE — TELEPHONE ENCOUNTER
I do not see the appt she has - I think it is worthwhile to be evaluated in the clinic.  The ER work-up tends to focus on ruling out life-threatening things, so an office visit might be appropriate.

## 2024-03-15 NOTE — TELEPHONE ENCOUNTER
Reason for call:  Patient reporting a symptom    Symptom or request: QUESTIONS ABOUT PAIN IN SHOULDER     PATIENT HAS AN APPT BUT IS NOT SURE SHE NEEDS THE APPT     PATIENT WANTS TO GO OVER SYMPTOMS    Duration (how long have symptoms been present): 5 DAYS    Have you been treated for this before? Yes    Additional comments: PATIENT WENT TO ER THURSDAY FOR PAIN    PAIN IS AT A 9 TODAY    Phone Number patient can be reached at:  Cell number on file:    Telephone Information:   Mobile [phone]       Best Time:  ASAP    Can we leave a detailed message on this number:  YES    Call taken on 3/15/2024 at 9:06 AM by Rachelle Madrigal

## 2024-03-18 ENCOUNTER — OFFICE VISIT (OUTPATIENT)
Dept: PEDIATRICS | Facility: CLINIC | Age: 59
End: 2024-03-18
Payer: COMMERCIAL

## 2024-03-18 VITALS
SYSTOLIC BLOOD PRESSURE: 117 MMHG | BODY MASS INDEX: 26.72 KG/M2 | RESPIRATION RATE: 16 BRPM | HEIGHT: 68 IN | TEMPERATURE: 98.3 F | DIASTOLIC BLOOD PRESSURE: 80 MMHG | OXYGEN SATURATION: 99 % | WEIGHT: 176.3 LBS | HEART RATE: 76 BPM

## 2024-03-18 DIAGNOSIS — G57.02 PIRIFORMIS SYNDROME OF LEFT SIDE: Primary | ICD-10-CM

## 2024-03-18 DIAGNOSIS — Z12.4 CERVICAL CANCER SCREENING: ICD-10-CM

## 2024-03-18 DIAGNOSIS — Z11.4 SCREENING FOR HIV (HUMAN IMMUNODEFICIENCY VIRUS): ICD-10-CM

## 2024-03-18 PROBLEM — F32.2 MDD (MAJOR DEPRESSIVE DISORDER), SINGLE EPISODE, SEVERE (H): Status: RESOLVED | Noted: 2023-10-16 | Resolved: 2024-03-18

## 2024-03-18 PROBLEM — F32.9 MAJOR DEPRESSION: Status: RESOLVED | Noted: 2023-10-03 | Resolved: 2024-03-18

## 2024-03-18 PROCEDURE — 99214 OFFICE O/P EST MOD 30 MIN: CPT | Performed by: INTERNAL MEDICINE

## 2024-03-18 RX ORDER — CYCLOBENZAPRINE HCL 10 MG
10 TABLET ORAL 3 TIMES DAILY PRN
Qty: 30 TABLET | Refills: 0 | Status: SHIPPED | OUTPATIENT
Start: 2024-03-18

## 2024-03-18 ASSESSMENT — PAIN SCALES - GENERAL: PAINLEVEL: SEVERE PAIN (7)

## 2024-03-18 ASSESSMENT — PATIENT HEALTH QUESTIONNAIRE - PHQ9
10. IF YOU CHECKED OFF ANY PROBLEMS, HOW DIFFICULT HAVE THESE PROBLEMS MADE IT FOR YOU TO DO YOUR WORK, TAKE CARE OF THINGS AT HOME, OR GET ALONG WITH OTHER PEOPLE: NOT DIFFICULT AT ALL
SUM OF ALL RESPONSES TO PHQ QUESTIONS 1-9: 2
SUM OF ALL RESPONSES TO PHQ QUESTIONS 1-9: 2

## 2024-03-18 NOTE — PATIENT INSTRUCTIONS
We could change from ibuprofen to Aleve (naproxen) 440 mg twice daily.    Try flexeril (cyclobenzaprine) up three times daily but mainly at night.    Heat pads.    Massage.    Consider physical therapy, let us know.    Salvador Lynn MD  Internal Medicine and Pediatrics

## 2024-03-18 NOTE — PROGRESS NOTES
Assessment & Plan         Piriformis syndrome of left side    No concerning findings.     Patient Instructions   We could change from ibuprofen to Aleve (naproxen) 440 mg twice daily.    Try flexeril (cyclobenzaprine) up three times daily but mainly at night.    Heat pads.    Massage.    Consider physical therapy, let us know.    Salvador Lynn MD  Internal Medicine and Pediatrics        - cyclobenzaprine (FLEXERIL) 10 MG tablet; Take 1 tablet (10 mg) by mouth 3 times daily as needed for muscle spasms          MED REC REQUIRED  Post Medication Reconciliation Status:  Patient was not discharged from an inpatient facility or TCU    See Patient Instructions    Subjective   Angi is a 58 year old, presenting for the following health issues:  ER F/U ([facility] ED 3/14/24, chest pain and shoulder pain. Patient states that there has been no improvement on her left back shoulder. )        3/18/2024    11:02 AM   Additional Questions   Roomed by JENNIFER Capps   Accompanied by BHAVESH         3/18/2024    11:02 AM   Patient Reported Additional Medications   Patient reports taking the following new medications N/A     7 days ago; left shoulder and back pain.  Had helped her friend move furniture 3 days before.     Feels a burning sensation, throbbing, pain to the medial aspect of left shoulder blade; if bends neck to right and forward, can get worse.  Can't get comfortable sleeping.  No numnbess or tingling.  Constant pain.     In emergency room had a chest x-ray and blood work;     Has tried ibuprofen 600 three times daily.      Using cold packs and hot packs.     History of Present Illness       Back Pain:  She presents for follow up of back pain. Patient's back pain is a new problem.    Original cause of back pain: not sure  First noticed back pain: in the last week  Patient feels back pain: constantlyLocation of back pain:  Left shoulder  Description of back pain: burning  Back pain spreads: left side of  "neck    Since patient first noticed back pain, pain is: always present, but gets better and worse  Does back pain interfere with her job:  Yes  On a scale of 1-10 (10 being the worst), patient describes pain as:  7  What makes back pain worse: nothing   Acupuncture: not tried  Acetaminophen: not tried  Activity or exercise: not tried  Chiropractor:  Not tried  Cold: not helpful  Heat: not helpful  Massage: not tried  Muscle relaxants: not tried  NSAIDS: helpful  Opioids: not tried  Physical Therapy: not tried  Rest: not helpful  Steroid Injection: not tried  Stretching: not helpful  Surgery: not tried  TENS unit: not tried  Topical pain relievers: not helpful  Other healthcare providers patient is seeing for back pain: None    She eats 0-1 servings of fruits and vegetables daily.She consumes 1 sweetened beverage(s) daily.She exercises with enough effort to increase her heart rate 9 or less minutes per day.  She exercises with enough effort to increase her heart rate 3 or less days per week. She is missing 1 dose(s) of medications per week.  She is not taking prescribed medications regularly due to remembering to take.         ED/UC Followup:    Facility:  [facility] ED  Date of visit: 3/14/24  Reason for visit: Chest and shoulder pain  Current Status: no longer having chest pain but still has shoulder pain            Objective    /80   Pulse 76   Temp 98.3  F (36.8  C) (Oral)   Resp 16   Ht 1.727 m (5' 8\")   Wt 80 kg (176 lb 4.8 oz)   LMP  (LMP Unknown)   SpO2 99%   Breastfeeding No   BMI 26.81 kg/m    Body mass index is 26.81 kg/m .  Physical Exam   GENERAL: alert and no distress  EYES: Eyes grossly normal to inspection, PERRL and conjunctivae and sclerae normal  HENT: ear canals and TM's normal, nose and mouth without ulcers or lesions  NECK: no adenopathy, no asymmetry, masses, or scars  RESP: lungs clear to auscultation - no rales, rhonchi or wheezes  CV: regular rate and rhythm, " normal S1 S2, no S3 or S4, no murmur, click or rub, no peripheral edema  MS: no gross musculoskeletal defects noted, no edema.  Pain in medial part of left scapula, high up.   SKIN: no suspicious lesions or rashes  NEURO: Normal strength and tone, mentation intact and speech normal            Signed Electronically by: Salvador Lynn MD

## 2024-05-07 ENCOUNTER — ANCILLARY PROCEDURE (OUTPATIENT)
Dept: MAMMOGRAPHY | Facility: CLINIC | Age: 59
End: 2024-05-07
Attending: INTERNAL MEDICINE
Payer: COMMERCIAL

## 2024-05-07 DIAGNOSIS — Z12.31 VISIT FOR SCREENING MAMMOGRAM: ICD-10-CM

## 2024-05-07 DIAGNOSIS — Z98.82 HISTORY OF BILATERAL BREAST IMPLANTS: ICD-10-CM

## 2024-05-07 PROCEDURE — 77067 SCR MAMMO BI INCL CAD: CPT | Mod: TC | Performed by: RADIOLOGY

## 2024-05-07 PROCEDURE — 77063 BREAST TOMOSYNTHESIS BI: CPT | Mod: TC | Performed by: RADIOLOGY

## 2024-07-25 ENCOUNTER — TELEPHONE (OUTPATIENT)
Dept: PEDIATRICS | Facility: CLINIC | Age: 59
End: 2024-07-25
Payer: COMMERCIAL

## 2024-07-25 DIAGNOSIS — G47.20 CIRCADIAN DYSREGULATION: Primary | ICD-10-CM

## 2024-07-25 NOTE — TELEPHONE ENCOUNTER
Patient seen 9/29/23  Summary Recommendations(things to be done):  Assessment of current sleep schedule with sleep diaries and actigraphy-results will be provided through [employer].  Return to clinic in 3 to 4 months.    Orders no longer valid for scheduling. Please review pended order and sign if appropriate.

## 2024-07-25 NOTE — TELEPHONE ENCOUNTER
FYI - Status Update    Who is Calling: patient    Update: Patient would like sleep study referral reissued.    Does caller want a call/response back: Yes     Could we send this information to you in [employer] or would you prefer to receive a phone call?:   Patient would prefer a phone call   Okay to leave a detailed message?: Yes at Home number on file [phone] (home)

## 2024-07-30 ENCOUNTER — VIRTUAL VISIT (OUTPATIENT)
Dept: URGENT CARE | Facility: CLINIC | Age: 59
End: 2024-07-30
Payer: COMMERCIAL

## 2024-07-30 ENCOUNTER — TELEPHONE (OUTPATIENT)
Dept: PEDIATRICS | Facility: CLINIC | Age: 59
End: 2024-07-30

## 2024-07-30 DIAGNOSIS — R19.7 DIARRHEA OF PRESUMED INFECTIOUS ORIGIN: Primary | ICD-10-CM

## 2024-07-30 PROCEDURE — 99213 OFFICE O/P EST LOW 20 MIN: CPT | Mod: 95

## 2024-07-30 NOTE — PATIENT INSTRUCTIONS
Go onto [employer] and make a lab only visit at the closest [place] lab to collect the lab equipment needed to collect your stool samples.  Once you have collected your samples return the specimens to the lab.  We should have results within 48 hrs  You can try Pepto Bismol but it will change the color of your stools.  Keep well hydrated with water   If these tests are negative you will need to follow up with your provider for further evaluation.

## 2024-07-30 NOTE — PROGRESS NOTES
"Angi is a 58 year old who is being evaluated via a billable video visit.          Assessment & Plan     Diarrhea of presumed infectious origin    - Enteric Bacteria and Virus Panel by [facility] Stool; Future  - C. difficile Toxin B PCR with reflex to C. difficile Antigen and Toxins A/B EIA; Future          BMI  Estimated body mass index is 26.81 kg/m  as calculated from the following:    Height as of 3/18/24: 1.727 m (5' 8\").    Weight as of 3/18/24: 80 kg (176 lb 4.8 oz).         Patient Instructions   Go onto [employer] and make a lab only visit at the closest [place] lab to collect the lab equipment needed to collect your stool samples.  Once you have collected your samples return the specimens to the lab.  We should have results within 48 hrs  You can try Pepto Bismol but it will change the color of your stools.  Keep well hydrated with water   If these tests are negative you will need to follow up with your provider for further evaluation.     Return in about 1 week (around 8/6/2024), or if symptoms worsen or fail to improve.    Subjective   Angi is a 58 year old, presenting for the following health issues:  No chief complaint on file.    HPI     Diarrhea  Onset/Duration: 3 weeks  Description:       Consistency of stool: runny       Blood in stool: No       Number of loose stools past 24 hours: 5  Progression of Symptoms: constant  Accompanying signs and symptoms:       Fever: No       Nausea/Vomiting: No       Abdominal pain: No       Weight loss: No       Episodes of constipation: No  History   Ill contacts: No  Recent use of antibiotics: No  Recent travels: No  Recent medication-new or changes(Rx or OTC): No  Precipitating or alleviating factors: Did use the community hot tub prior to this starting. Also weaned off her venlafaxine over 3 weeks.   Therapies tried and outcome: fluids        Review of Systems  Constitutional, HEENT, cardiovascular, pulmonary, gi and gu systems are negative, except as otherwise " noted.      Objective           Vitals:  No vitals were obtained today due to virtual visit.    Physical Exam   GENERAL: alert and no distress  RESP: No audible wheeze, cough, or visible cyanosis.    PSYCH: Appropriate affect, tone, and pace of words          Video-Visit Details    Type of service:  Video Visit   Originating Location (pt. Location): Home    Distant Location (provider location):  Off-site  Platform used for Video Visit: [employer]  Signed Electronically by: [facility]

## 2024-07-31 ENCOUNTER — LAB (OUTPATIENT)
Dept: LAB | Facility: CLINIC | Age: 59
End: 2024-07-31
Payer: COMMERCIAL

## 2024-07-31 DIAGNOSIS — R19.7 DIARRHEA OF PRESUMED INFECTIOUS ORIGIN: ICD-10-CM

## 2024-07-31 DIAGNOSIS — Z11.4 SCREENING FOR HIV (HUMAN IMMUNODEFICIENCY VIRUS): Primary | ICD-10-CM

## 2024-07-31 PROCEDURE — 87389 HIV-1 AG W/HIV-1&-2 AB AG IA: CPT

## 2024-07-31 PROCEDURE — 36415 COLL VENOUS BLD VENIPUNCTURE: CPT

## 2024-08-01 LAB
ADV 40+41 DNA STL QL NAA+NON-PROBE: NEGATIVE
ASTRO TYP 1-8 RNA STL QL NAA+NON-PROBE: NEGATIVE
C CAYETANENSIS DNA STL QL NAA+NON-PROBE: NEGATIVE
C DIFF TOX B STL QL: NEGATIVE
CAMPYLOBACTER DNA SPEC NAA+PROBE: NEGATIVE
CRYPTOSP DNA STL QL NAA+NON-PROBE: NEGATIVE
E COLI O157 DNA STL QL NAA+NON-PROBE: NORMAL
E HISTOLYT DNA STL QL NAA+NON-PROBE: NEGATIVE
EAEC ASTA GENE ISLT QL NAA+PROBE: NEGATIVE
EC STX1+STX2 GENES STL QL NAA+NON-PROBE: NEGATIVE
EPEC EAE GENE STL QL NAA+NON-PROBE: NEGATIVE
ETEC LTA+ST1A+ST1B TOX ST NAA+NON-PROBE: NEGATIVE
G LAMBLIA DNA STL QL NAA+NON-PROBE: NEGATIVE
HIV 1+2 AB+HIV1 P24 AG SERPL QL IA: NONREACTIVE
NOROVIRUS GI+II RNA STL QL NAA+NON-PROBE: NEGATIVE
P SHIGELLOIDES DNA STL QL NAA+NON-PROBE: NEGATIVE
RVA RNA STL QL NAA+NON-PROBE: NEGATIVE
SALMONELLA SP RPOD STL QL NAA+PROBE: NEGATIVE
SAPO I+II+IV+V RNA STL QL NAA+NON-PROBE: NEGATIVE
SHIGELLA SP+EIEC IPAH ST NAA+NON-PROBE: NEGATIVE
V CHOLERAE DNA SPEC QL NAA+PROBE: NEGATIVE
VIBRIO DNA SPEC NAA+PROBE: NEGATIVE
Y ENTEROCOL DNA STL QL NAA+PROBE: NEGATIVE

## 2024-08-01 PROCEDURE — 87493 C DIFF AMPLIFIED PROBE: CPT

## 2024-08-01 PROCEDURE — 87507 IADNA-DNA/RNA PROBE TQ 12-25: CPT

## 2024-08-02 NOTE — RESULT ENCOUNTER NOTE
Dear Angi,    Your lab results are normal.  At this time, I do not recommend any changes to your current plan of care.    Please feel free to call with any questions.      Sincerely,    Rashmi Miramontes MD

## 2024-08-06 ENCOUNTER — TELEPHONE (OUTPATIENT)
Dept: SLEEP MEDICINE | Facility: CLINIC | Age: 59
End: 2024-08-06
Payer: COMMERCIAL

## 2024-08-06 NOTE — TELEPHONE ENCOUNTER
Reason for Call:  Appointment Request    Patient requesting this type of appt:  sleep study    Requested provider:  NA    Reason patient unable to be scheduled: Needs to be scheduled by clinic    When does patient want to be seen/preferred time:  routine    Comments: orders indicate actigraphy. Patient has been trying to get this scheduled for a week. Please call to schedule.    Could we send this information to you in [employer] or would you prefer to receive a phone call?:   Patient would prefer a phone call   Okay to leave a detailed message?: Yes at Cell number on file:    Telephone Information:   Mobile [phone]       Call taken on 8/6/2024 at 11:32 AM by Sierra Gay

## 2024-08-07 ENCOUNTER — TELEPHONE (OUTPATIENT)
Dept: SLEEP MEDICINE | Facility: CLINIC | Age: 59
End: 2024-08-07
Payer: COMMERCIAL

## 2024-08-07 NOTE — TELEPHONE ENCOUNTER
Reason for Call:  Appointment Request    Patient requesting this type of appt:  sleep study    Requested provider:  keegan ahmadi    Reason patient unable to be scheduled: Needs to be scheduled by clinic    When does patient want to be seen/preferred time:  as soon as possible    Comments: no one has contacted her for weeks regarding  this    Could we send this information to you in [employer] or would you prefer to receive a phone call?:   Patient would prefer a phone call   Okay to leave a detailed message?: Yes at Cell number on file:    Telephone Information:   Mobile [phone]       Call taken on 8/7/2024 at 9:26 AM by Emily Riddle

## 2024-08-08 ENCOUNTER — TELEPHONE (OUTPATIENT)
Dept: SLEEP MEDICINE | Facility: CLINIC | Age: 59
End: 2024-08-08
Payer: COMMERCIAL

## 2024-08-22 ENCOUNTER — OFFICE VISIT (OUTPATIENT)
Dept: SLEEP MEDICINE | Facility: CLINIC | Age: 59
End: 2024-08-22
Payer: COMMERCIAL

## 2024-08-22 DIAGNOSIS — G47.20 CIRCADIAN DYSREGULATION: ICD-10-CM

## 2024-09-05 ENCOUNTER — DOCUMENTATION ONLY (OUTPATIENT)
Dept: SLEEP MEDICINE | Facility: CLINIC | Age: 59
End: 2024-09-05
Payer: COMMERCIAL

## 2024-09-06 ENCOUNTER — TELEPHONE (OUTPATIENT)
Dept: PEDIATRICS | Facility: CLINIC | Age: 59
End: 2024-09-06
Payer: COMMERCIAL

## 2024-09-06 NOTE — TELEPHONE ENCOUNTER
Order/Referral Request    Who is requesting: Imaging recommended it due to dense breast tissue    Orders being requested: Ultrasound of the breasts    Reason service is needed/diagnosis: Dense breast tissue    When are orders needed by: ASAP    Has this been discussed with Provider: No    Does patient have a preference on a Group/Provider/Facility? [facility]    Does patient have an appointment scheduled?: No    Where to send orders: Place orders within Epic    Could we send this information to you in [facility] or would you prefer to receive a phone call?:   No preference   Okay to leave a detailed message?: Yes at Cell number on file:    Telephone Information:   Mobile [phone]

## 2024-09-07 ENCOUNTER — MYC MEDICAL ADVICE (OUTPATIENT)
Dept: PEDIATRICS | Facility: CLINIC | Age: 59
End: 2024-09-07
Payer: COMMERCIAL

## 2024-09-09 NOTE — TELEPHONE ENCOUNTER
"Does provider need a visit with pt? Patient lost her job and will not have insurance after 9/20/24.     Patient requesting bilateral ultrasound of breasts due to findings on regular mammogram:    \"BILATERAL FULL FIELD DIGITAL SCREENING MAMMOGRAM WITH TOMOSYNTHESIS     Performed on: 5/7/24     Compared to: 07/22/2022, 07/22/2022, 07/12/2021, and 06/29/2020     Technique:  This study was evaluated with the assistance of Computer-Aided Detection.  Breast Tomosynthesis was used in interpretation.     Findings: The breasts are heterogeneously dense, which may obscure small masses.  There are breast augmentation changes in both breasts.  There is no radiographic evidence of malignancy.                                                                    IMPRESSION: ACR BI-RADS Category 2: Benign     BREAST CANCER SCREENING RECOMMENDATION: Routine yearly mammography beginning at age 40 or as discussed with your provider.     The results and recommendations of this examination will be communicated to the patient.\"    Henna Guevara RN   "

## 2024-09-10 NOTE — TELEPHONE ENCOUNTER
Can RN please get clarification from breast center?  I have not received a request from the breast center for an ultrasound order (usually this is automatically ordered by them if indicated).  I also do not see a recommendation for ultrasound follow-up to be completed in the mammogram report.  I was not aware of any recommendation to get ultrasound due to dense breast tissue, so would like clarity directly from the breast center.    Rashmi Miramontes MD

## 2024-09-11 NOTE — TELEPHONE ENCOUNTER
RN called and spoke with staff member at [facility], Laura.     Laura states that while patient's findings indicate dense breast tissue, the recommendation notes was yearly mammograms. There is no further addendum or additions to note she can see.     Breast center recommended yearly mammograms.    Please review and advise if you would like to  order ultrasound.       Ericka TALLEY RN on 9/11/2024 at 9:49 AM

## 2024-09-12 NOTE — TELEPHONE ENCOUNTER
RN called and spoke with patient to relay provider recommendation.  Patient was given an opportunity to ask questions, verbalized understanding of plan, and is agreeable.  Instructed pt to call back if new or worsening symptoms.      Ericka TALLEY RN on 9/12/2024 at 4:55 PM

## 2024-09-12 NOTE — TELEPHONE ENCOUNTER
Please call pt and give this information - reassure that there is no recommendation for further testing at this time.  Let me know if she has further questions.    Rashmi Miramontes MD

## 2024-09-24 NOTE — PROGRESS NOTES
Actigraphy August 22 to September 4, 2024 average data: Sleep onset at 10:53 PM sleep offset 8:47 AM sleep efficiency 79% average total sleep time 7.6 hours with 20 awakenings lasting up to 6 minutes and estimated wake after sleep onset-17 minutes.    Findings suggest adequate time in bed with some variation in schedule sleep onset 8 PM to 12 AM and some evidence for sleep disruption based on actigraphy.

## 2025-01-20 ASSESSMENT — PATIENT HEALTH QUESTIONNAIRE - PHQ9
SUM OF ALL RESPONSES TO PHQ QUESTIONS 1-9: 3
SUM OF ALL RESPONSES TO PHQ QUESTIONS 1-9: 3
10. IF YOU CHECKED OFF ANY PROBLEMS, HOW DIFFICULT HAVE THESE PROBLEMS MADE IT FOR YOU TO DO YOUR WORK, TAKE CARE OF THINGS AT HOME, OR GET ALONG WITH OTHER PEOPLE: NOT DIFFICULT AT ALL

## 2025-01-21 ENCOUNTER — OFFICE VISIT (OUTPATIENT)
Dept: PEDIATRICS | Facility: CLINIC | Age: 60
End: 2025-01-21
Payer: COMMERCIAL

## 2025-01-21 VITALS
WEIGHT: 176.2 LBS | DIASTOLIC BLOOD PRESSURE: 73 MMHG | HEIGHT: 68 IN | TEMPERATURE: 97.9 F | HEART RATE: 74 BPM | RESPIRATION RATE: 18 BRPM | BODY MASS INDEX: 26.7 KG/M2 | SYSTOLIC BLOOD PRESSURE: 118 MMHG | OXYGEN SATURATION: 99 %

## 2025-01-21 DIAGNOSIS — Z98.82 H/O BREAST AUGMENTATION: ICD-10-CM

## 2025-01-21 DIAGNOSIS — Z01.818 PREOP GENERAL PHYSICAL EXAM: Primary | ICD-10-CM

## 2025-01-21 PROBLEM — A09 INFECTIOUS DIARRHEA: Status: RESOLVED | Noted: 2022-03-30 | Resolved: 2025-01-21

## 2025-01-21 PROCEDURE — 99213 OFFICE O/P EST LOW 20 MIN: CPT | Performed by: STUDENT IN AN ORGANIZED HEALTH CARE EDUCATION/TRAINING PROGRAM

## 2025-01-21 ASSESSMENT — PAIN SCALES - GENERAL: PAINLEVEL_OUTOF10: NO PAIN (0)

## 2025-01-21 NOTE — PATIENT INSTRUCTIONS
How to Take Your Medication Before Surgery  Preoperative Medication Instructions   Antiplatelet or Anticoagulation Medication Instructions   - Patient is on no antiplatelet or anticoagulation medications.    Additional Medication Instructions  Patient is on no additional chronic medications       Patient Education   Preparing for Your Surgery  For Adults  Getting started  In most cases, a nurse will call to review your health history and instructions. They will give you an arrival time based on your scheduled surgery time. Please be ready to share:  Your doctor's clinic name and phone number  Your medical, surgical, and anesthesia history  A list of allergies and sensitivities  A list of medicines, including herbal treatments and over-the-counter drugs  Whether the patient has a legal guardian (ask how to send us the papers in advance)  Note: You may not receive a call if you were seen at our PAC (Preoperative Assessment Center).  Please tell us if you're pregnant--or if there's any chance you might be pregnant. Some surgeries may injure a fetus (unborn baby), so they require a pregnancy test. Surgeries that are safe for a fetus don't always need a test, and you can choose whether to have one.   Preparing for surgery  Within 10 to 30 days of surgery: Have a pre-op exam (sometimes called an H&P, or History and Physical). This can be done at a clinic or pre-operative center.  If you're having a , you may not need this exam. Talk to your care team.  At your pre-op exam, talk to your care team about all medicines you take. (This includes CBD oil and any drugs, such as THC, marijuana, and other forms of cannabis.) If you need to stop any medicine before surgery, ask when to start taking it again.  This is for your safety. Many medicines and drugs can make you bleed too much during surgery. Some change how well surgery (anesthesia) drugs work.  Call your insurance company to let them know you're having surgery.  (If you don't have insurance, call [phone].)  Call your clinic if there's any change in your health. This includes a scrape or scratch near the surgery site, or any signs of a cold (sore throat, runny nose, cough, rash, fever).  Eating and drinking guidelines  For your safety: Unless your surgeon tells you otherwise, follow the guidelines below.  Eat and drink as normal until 8 hours before you arrive for surgery. After that, no food or milk. You can spit out gum when you arrive.  Drink clear liquids until 2 hours before you arrive. These are liquids you can see through, like water, Gatorade, and Propel Water. They also include plain black coffee and tea (no cream or milk).  No alcohol for 24 hours before you arrive. The night before surgery, stop any drinks that contain THC.  If your care team tells you to take medicine on the morning of surgery, it's okay to take it with a sip of water. No other medicines or drugs are allowed (including CBD oil)--follow your care team's instructions.  If you have questions the day of surgery, call your hospital or surgery center.   Preventing infection  Shower or bathe the night before and the morning of surgery. Follow the instructions your clinic gave you. (If no instructions, use regular soap.)  Don't shave or clip hair near your surgery site. We'll remove the hair if needed.  Don't smoke or vape the morning of surgery. No chewing tobacco for 6 hours before you arrive. A nicotine patch is okay. You may spit out nicotine gum when you arrive.  For some surgeries, the surgeon will tell you to fully quit smoking and nicotine.  We will make every effort to keep you safe from infection. We will:  Clean our hands often with soap and water (or an alcohol-based hand rub).  Clean the skin at your surgery site with a special soap that kills germs.  Give you a special gown to keep you warm. (Cold raises the risk of infection.)  Wear hair covers, masks, gowns, and gloves during  surgery.  Give antibiotic medicine, if prescribed. Not all surgeries need this medicine.  What to bring on the day of surgery  Photo ID and insurance card  Copy of your health care directive, if you have one  Glasses and hearing aids (bring cases)  You can't wear contacts during surgery  Inhaler and eye drops, if you use them (tell us about these when you arrive)  CPAP machine or breathing device, if you use them  A few personal items, if spending the night  If you have . . .  A pacemaker, ICD (cardiac defibrillator), or other implant: Bring the ID card.  An implanted stimulator: Bring the remote control.  A legal guardian: Bring a copy of the certified (court-stamped) guardianship papers.  Please remove any jewelry, including body piercings. Leave jewelry and other valuables at home.  If you're going home the day of surgery  You must have a responsible adult drive you home. They should stay with you overnight as well.  If you don't have someone to stay with you, and you aren't safe to go home alone, we may keep you overnight. Insurance often won't pay for this.  After surgery  If it's hard to control your pain or you need more pain medicine, please call your surgeon's office.  Questions?   If you have any questions for your care team, list them here:   ____________________________________________________________________________________________________________________________________________________________________________________________________________________________________________________________  For informational purposes only. Not to replace the advice of your health care provider. Copyright   2003, 2019 [facility]. All rights reserved. Clinically reviewed by Danilo Devine MD. [employer] [id number] - REV 08/24.

## 2025-01-21 NOTE — PROGRESS NOTES
Preoperative Evaluation  [facility]  [address]  [address]  [place] [address]  Phone: [phone]  Fax: [phone]  Primary Provider: mAna Zepeda MD  Pre-op Performing Provider: Deirdre E. Milligan, MD  Jan 21, 2025 1/20/2025   Surgical Information   What procedure is being done? breast implant replacement   Facility or Hospital where procedure/surgery will be performed: [facility]   Who is doing the procedure / surgery? Dr Ortiz   Date of surgery / procedure: Feb 18   Time of surgery / procedure: am   Where do you plan to recover after surgery? at home with family     Fax number for surgical facility: [phone]    Assessment & Plan     The proposed surgical procedure is considered INTERMEDIATE risk.    Preop general physical exam  Medically optimized for surgery.    H/O breast augmentation  Getting replacement.      Possible Sleep Apnea: Has CPAP nasal mask at home, will be propped up on pillows sleeping after surgery. No further work up indicated.           - No identified additional risk factors other than previously addressed    Preoperative Medication Instructions  Antiplatelet or Anticoagulation Medication Instructions   - Patient is on no antiplatelet or anticoagulation medications.    Additional Medication Instructions  Patient is on no additional chronic medications    Recommendation  Approval given to proceed with proposed procedure, without further diagnostic evaluation.    Alonzo Whitley is a 59 year old, presenting for the following:  Pre-Op Exam          1/21/2025     1:37 PM   Additional Questions   Roomed by Isha Myers   Accompanied by N/A     HPI related to upcoming procedure:   Breast implants 16 years ago, will be getting replaced.    Generally walks a lot >10k steps daily. Exercises more intensely 1 day per week.          1/20/2025   Pre-Op Questionnaire   Have you ever had a heart attack or stroke? No   Have you ever  had surgery on your heart or blood vessels, such as a stent placement, a coronary artery bypass, or surgery on an artery in your head, neck, heart, or legs? No   Do you have chest pain with activity? No   Do you have a history of heart failure? No   Do you currently have a cold, bronchitis or symptoms of other infection? No   Do you have a cough, shortness of breath, or wheezing? No   Do you or anyone in your family have previous history of blood clots? No   Do you or does anyone in your family have a serious bleeding problem such as prolonged bleeding following surgeries or cuts? No   Have you ever had problems with anemia or been told to take iron pills? No   Have you had any abnormal blood loss such as black, tarry or bloody stools, or abnormal vaginal bleeding? No   Have you ever had a blood transfusion? No   Are you willing to have a blood transfusion if it is medically needed before, during, or after your surgery? Yes   Have you or any of your relatives ever had problems with anesthesia? No   Do you have sleep apnea, excessive snoring or daytime drowsiness? (!) YES - does not use CPAP due to intolerance   Do you have a CPAP machine? Yes   Do you have any artifical heart valves or other implanted medical devices like a pacemaker, defibrillator, or continuous glucose monitor? No   Do you have artificial joints? No   Are you allergic to latex? No     Health Care Directive  Patient does not have a Health Care Directive: Discussed advance care planning with patient; information given to patient to review.    Preoperative Review of    reviewed - no record of controlled substances prescribed.          Patient Active Problem List    Diagnosis Date Noted    Cognitive decline 05/10/2023     Priority: Medium     Last Assessment & Plan:    Formatting of this note might be different from the original.   Recent brain MRI without any acute findings.  Patient states that her memory is still an issue but plans to  follow-up when she moves to [place] for continued evaluation.  No new concerns or complaints today.      Trochanteric bursitis of left hip 05/10/2023     Priority: Medium     Last Assessment & Plan:    Formatting of this note is different from the original.   Nontraumatic left hip pain, worse at night and while lying on her side.  Patient states she also notices numbness and tingling down the leg and occasional weakness.  Unable to complete full exam today due to time.   - Follow-up on 6/7/2023 for full neurologic exam   - Plan to check x-rays of low back and left hip   - Referral to physical therapy      Infectious diarrhea 03/30/2022     Priority: Medium     Formatting of this note might be different from the original.   Last Assessment & Plan:    Formatting of this note might be different from the original.   Encouraged electrolyte replacement and hydration.   Signs and symptoms reviewed and when to follow up or go to ER.   Discussed antibiotic therapy and to take with food.    Complete whole antibiotic treatment/duration.  Last Assessment & Plan:    Formatting of this note might be different from the original.   Encouraged electrolyte replacement and hydration.   Signs and symptoms reviewed and when to follow up or go to ER.   Discussed antibiotic therapy and to take with food.    Complete whole antibiotic treatment/duration.      Chronic allergic conjunctivitis since 2011 08/20/2013     Priority: Medium    Anxiety 02/12/2013     Priority: Medium    Mild recurrent major depression 02/12/2013     Priority: Medium    Idiopathic hypersomnolence 06/06/2012     Priority: Medium      Past Medical History:   Diagnosis Date    Depressive disorder     Sleep apnea      Past Surgical History:   Procedure Laterality Date    BREAST SURGERY  2009    Implants    COLONOSCOPY  03/06/2013    Procedure: COLONOSCOPY;;  Surgeon: Juan Daniel Sahu MD;  Location:  GI    COLONOSCOPY N/A 1/10/2024    Procedure: COLONOSCOPY, FLEXIBLE,  "WITH LESION REMOVAL USING SNARE;  Surgeon: Demetrice Beckham MD;  Location:  GI    GYN SURGERY      hyst    HYSTERECTOMY, PAP NO LONGER INDICATED      TUBAL LIGATION       Current Outpatient Medications   Medication Sig Dispense Refill    buPROPion (WELLBUTRIN XL) 150 MG 24 hr tablet Take 150 mg by mouth every morning      cyclobenzaprine (FLEXERIL) 10 MG tablet Take 1 tablet (10 mg) by mouth 3 times daily as needed for muscle spasms 30 tablet 0    LORazepam (ATIVAN) 0.5 MG tablet       venlafaxine (EFFEXOR XR) 37.5 MG 24 hr capsule Take 75 mg by mouth daily         Allergies   Allergen Reactions    Hydrocodone-Acetaminophen Anaphylaxis    Oxycodone-Acetaminophen Anaphylaxis and Itching    Morphine And Codeine     Penicillins     Sulfa Antibiotics         Social History     Tobacco Use    Smoking status: Never     Passive exposure: Never    Smokeless tobacco: Never   Substance Use Topics    Alcohol use: Yes     Comment: 1/every two weeks       History   Drug Use Unknown               Objective    /73 (BP Location: Right arm, Patient Position: Sitting, Cuff Size: Adult Regular)   Pulse 74   Temp 97.9  F (36.6  C) (Temporal)   Resp 18   Ht 1.727 m (5' 8\")   Wt 79.9 kg (176 lb 3.2 oz)   LMP  (LMP Unknown)   SpO2 99%   BMI 26.79 kg/m     Estimated body mass index is 26.79 kg/m  as calculated from the following:    Height as of this encounter: 1.727 m (5' 8\").    Weight as of this encounter: 79.9 kg (176 lb 3.2 oz).  Physical Exam  GENERAL: alert and no distress  EYES: Eyes grossly normal to inspection, and conjunctivae and sclerae normal  HENT: ear canals and TM's normal, nose and mouth without ulcers or lesions  NECK: no adenopathy, no asymmetry, masses, or scars  RESP: lungs clear to auscultation - no rales, rhonchi or wheezes  CV: regular rate and rhythm, normal S1 S2, no S3 or S4, no murmur, click or rub, no peripheral edema  ABDOMEN: soft, nontender, no hepatosplenomegaly, no masses  MS: no " gross musculoskeletal defects noted, no edema  SKIN: no suspicious lesions or rashes  NEURO: Normal strength and tone, mentation intact and speech normal  PSYCH: mentation appears normal, affect normal/bright    Recent Labs   Lab Test 03/14/24  0532   HGB 12.5         POTASSIUM 4.0   CR 0.83        Diagnostics  No labs were ordered during this visit.   No EKG required, no history of coronary heart disease, significant arrhythmia, peripheral arterial disease or other structural heart disease.    Revised Cardiac Risk Index (RCRI)  The patient has the following serious cardiovascular risks for perioperative complications:   - No serious cardiac risks = 0 points     RCRI Interpretation: 0 points: Class I (very low risk - 0.4% complication rate)         Signed Electronically by: Deirdre E. Milligan, MD  A copy of this evaluation report is provided to the requesting physician.

## 2025-02-02 ENCOUNTER — HEALTH MAINTENANCE LETTER (OUTPATIENT)
Age: 60
End: 2025-02-02

## 2025-02-20 ENCOUNTER — NURSE TRIAGE (OUTPATIENT)
Dept: NURSING | Facility: CLINIC | Age: 60
End: 2025-02-20
Payer: COMMERCIAL

## 2025-02-20 NOTE — TELEPHONE ENCOUNTER
Nurse Triage SBAR    Is this a 2nd Level Triage? NO    Situation:  itchiness    Background: Had itching yesterday and switched antibiotics after surgery.     Assessment: Itching started two hours ago after starting Cephalexin at 9:30 pm.  Denies rash, breathing/swallowing problems. Patient is mainly calling to ask if she can take Zyrtec or Benadryl with her medications, including Tramadol.     Protocol Recommended Disposition:   Call PCP Within 24 Hours    Recommendation: Patient agrees she will call provider tomorrow and will ask to get her antibiotic changed. Writer advised Tramadol is not compatible with Zyrtec or Benadryl, and patient verbalized understanding.      Demetrice Lu RN  [facility]    Reason for Disposition   Taking prescription medication that could cause itching (e.g., codeine/morphine/other opiates, aspirin)    Additional Information   Negative: [1] Life-threatening reaction (anaphylaxis) in the past to similar substance (e.g., food, insect bite/sting, chemical, etc.) AND [2] < 2 hours since exposure   Negative: Difficulty breathing or wheezing   Negative: [1] Difficulty swallowing or slurred speech AND [2] sudden onset   Negative: Sounds like a life-threatening emergency to the triager   Negative: Patient sounds very sick or weak to the triager   Negative: [1] MODERATE-SEVERE widespread itching (i.e., interferes with sleep, normal activities or school) AND [2] not improved after 24 hours of itching Care Advice    Protocols used: [facility]

## 2025-02-22 ENCOUNTER — OFFICE VISIT (OUTPATIENT)
Dept: URGENT CARE | Facility: URGENT CARE | Age: 60
End: 2025-02-22
Payer: COMMERCIAL

## 2025-02-22 ENCOUNTER — ANCILLARY PROCEDURE (OUTPATIENT)
Dept: GENERAL RADIOLOGY | Facility: CLINIC | Age: 60
End: 2025-02-22
Attending: NURSE PRACTITIONER
Payer: COMMERCIAL

## 2025-02-22 VITALS
OXYGEN SATURATION: 99 % | RESPIRATION RATE: 16 BRPM | HEART RATE: 77 BPM | WEIGHT: 170.5 LBS | SYSTOLIC BLOOD PRESSURE: 113 MMHG | DIASTOLIC BLOOD PRESSURE: 75 MMHG | BODY MASS INDEX: 25.92 KG/M2 | TEMPERATURE: 97.7 F

## 2025-02-22 DIAGNOSIS — R05.1 ACUTE COUGH: Primary | ICD-10-CM

## 2025-02-22 DIAGNOSIS — R05.1 ACUTE COUGH: ICD-10-CM

## 2025-02-22 PROCEDURE — 71046 X-RAY EXAM CHEST 2 VIEWS: CPT | Mod: TC | Performed by: RADIOLOGY

## 2025-02-22 RX ORDER — AZITHROMYCIN 250 MG/1
TABLET, FILM COATED ORAL
Qty: 6 TABLET | Refills: 0 | Status: SHIPPED | OUTPATIENT
Start: 2025-02-22 | End: 2025-02-27

## 2025-02-22 NOTE — PROGRESS NOTES
"  Assessment & Plan   Problem List Items Addressed This Visit    None  Visit Diagnoses       Acute cough    -  Primary    Relevant Medications    azithromycin (ZITHROMAX) 250 MG tablet    Other Relevant Orders    XR Chest 2 Views (Completed)         Patient advised if symptoms persist, worsen or new symptoms arise they are to seek medical care.  All patients questions addressed. Patient verbalized understanding and agreement with plan.            BMI  Estimated body mass index is 25.92 kg/m  as calculated from the following:    Height as of 1/21/25: 1.727 m (5' 8\").    Weight as of this encounter: 77.3 kg (170 lb 8 oz).           No follow-ups on file.      Alonzo Whitley is a 59 year old, presenting for the following health issues:  Cough (58 yo F presents sick sine 2/14,  cough, body aches  neg covid test on 2/17   Tues had breast replacement surgery  tx-  tylenol last dose 6am  Mucinex at 10 am  allergic to /CEPHALEXIN and Clindomyicin.  Concerned of Pneumonia // )  Patient denies any shortness of breath chest pain chest pressure or palpitations      1/21/2025     1:37 PM   Additional Questions   Roomed by Isha Myers   Accompanied by N/A     HPI         Review of Systems  Constitutional, HEENT, cardiovascular, pulmonary, GI, , musculoskeletal, neuro, skin, endocrine and psych systems are negative, except as otherwise noted.      Objective    /75   Pulse 77   Temp 97.7  F (36.5  C)   Resp 16   Wt 77.3 kg (170 lb 8 oz)   LMP  (LMP Unknown)   SpO2 99%   BMI 25.92 kg/m    Body mass index is 25.92 kg/m .  Physical Exam   GENERAL: alert and no distress  EYES: Eyes grossly normal to inspection,  conjunctivae and sclerae normal  HENT: ear canals and TM's normal, nose and mouth without ulcers or lesions  NECK: no adenopathy   RESP: Diffuse rhonchi cleared with cough, respirations regular nonlabored  CV: regular rate and rhythm, normal S1 S2  PSYCH: mentation appears normal, affect " normal/bright    Results for orders placed or performed in visit on 02/22/25   XR Chest 2 Views     Status: None    Narrative    EXAM: XR CHEST 2 VIEWS  LOCATION: [facility]  DATE: 2/22/2025    INDICATION:  Acute cough  COMPARISON: 3/14/2024      Impression    IMPRESSION: Heart is normal in size. Small linear areas of atelectasis and scarring are noted at lung bases. No focal consolidation or effusion.         No current outpatient medications on file.    Signed Electronically by: Erma Chapa NP

## 2025-04-23 ENCOUNTER — OFFICE VISIT (OUTPATIENT)
Dept: PEDIATRICS | Facility: CLINIC | Age: 60
End: 2025-04-23
Payer: COMMERCIAL

## 2025-04-23 VITALS
RESPIRATION RATE: 16 BRPM | OXYGEN SATURATION: 97 % | HEIGHT: 68 IN | DIASTOLIC BLOOD PRESSURE: 79 MMHG | TEMPERATURE: 97.8 F | WEIGHT: 170.2 LBS | BODY MASS INDEX: 25.79 KG/M2 | SYSTOLIC BLOOD PRESSURE: 121 MMHG | HEART RATE: 72 BPM

## 2025-04-23 DIAGNOSIS — H91.92 DECREASED HEARING OF LEFT EAR: ICD-10-CM

## 2025-04-23 DIAGNOSIS — Z00.00 ROUTINE GENERAL MEDICAL EXAMINATION AT A HEALTH CARE FACILITY: Primary | ICD-10-CM

## 2025-04-23 DIAGNOSIS — Z13.1 SCREENING FOR DIABETES MELLITUS: ICD-10-CM

## 2025-04-23 DIAGNOSIS — Z13.220 LIPID SCREENING: ICD-10-CM

## 2025-04-23 DIAGNOSIS — Z78.0 POSTMENOPAUSAL STATUS: ICD-10-CM

## 2025-04-23 DIAGNOSIS — F33.2 SEVERE EPISODE OF RECURRENT MAJOR DEPRESSIVE DISORDER, WITHOUT PSYCHOTIC FEATURES (H): ICD-10-CM

## 2025-04-23 LAB
EST. AVERAGE GLUCOSE BLD GHB EST-MCNC: 105 MG/DL
HBA1C MFR BLD: 5.3 % (ref 0–5.6)

## 2025-04-23 PROCEDURE — 80061 LIPID PANEL: CPT | Performed by: PHYSICIAN ASSISTANT

## 2025-04-23 PROCEDURE — 83036 HEMOGLOBIN GLYCOSYLATED A1C: CPT | Performed by: PHYSICIAN ASSISTANT

## 2025-04-23 PROCEDURE — 99396 PREV VISIT EST AGE 40-64: CPT | Mod: 25 | Performed by: PHYSICIAN ASSISTANT

## 2025-04-23 PROCEDURE — 90750 HZV VACC RECOMBINANT IM: CPT | Performed by: PHYSICIAN ASSISTANT

## 2025-04-23 PROCEDURE — 3074F SYST BP LT 130 MM HG: CPT | Performed by: PHYSICIAN ASSISTANT

## 2025-04-23 PROCEDURE — 3078F DIAST BP <80 MM HG: CPT | Performed by: PHYSICIAN ASSISTANT

## 2025-04-23 PROCEDURE — 1125F AMNT PAIN NOTED PAIN PRSNT: CPT | Performed by: PHYSICIAN ASSISTANT

## 2025-04-23 PROCEDURE — 36415 COLL VENOUS BLD VENIPUNCTURE: CPT | Performed by: PHYSICIAN ASSISTANT

## 2025-04-23 PROCEDURE — 90471 IMMUNIZATION ADMIN: CPT | Performed by: PHYSICIAN ASSISTANT

## 2025-04-23 SDOH — HEALTH STABILITY: PHYSICAL HEALTH: ON AVERAGE, HOW MANY DAYS PER WEEK DO YOU ENGAGE IN MODERATE TO STRENUOUS EXERCISE (LIKE A BRISK WALK)?: 1 DAY

## 2025-04-23 ASSESSMENT — SOCIAL DETERMINANTS OF HEALTH (SDOH): HOW OFTEN DO YOU GET TOGETHER WITH FRIENDS OR RELATIVES?: ONCE A WEEK

## 2025-04-23 ASSESSMENT — PAIN SCALES - GENERAL: PAINLEVEL_OUTOF10: SEVERE PAIN (8)

## 2025-04-23 NOTE — PROGRESS NOTES
"Preventive Care Visit  [facility] GALO Mcghee PA-C, Physician Assistant  Apr 23, 2025      Assessment & Plan     Routine general medical examination at a health care facility  Discussed with patient general preventative health measures, including but not limited to healthy diet, exercise, alcohol use, drug use, immunizations, mood, and preventative screenings.   Schedule annual physical in one year.     Severe episode of recurrent major depressive disorder, without psychotic features (H)  Stable. No concerns. No meds.     Decreased hearing of left ear  Referral placed  - Adult Audiology  Referral    Postmenopausal status  Encouraged calcium and vit d  Weight bearing exercise  - DX Bone Density    Lipid screening    - Lipid panel reflex to direct LDL Fasting  - Lipid panel reflex to direct LDL Fasting    Screening for diabetes mellitus    - Hemoglobin A1c  - Hemoglobin A1c              BMI  Estimated body mass index is 25.62 kg/m  as calculated from the following:    Height as of this encounter: 1.736 m (5' 8.35\").    Weight as of this encounter: 77.2 kg (170 lb 3.2 oz).   Weight management plan: Discussed healthy diet and exercise guidelines    Counseling  Appropriate preventive services were addressed with this patient via screening, questionnaire, or discussion as appropriate for fall prevention, nutrition, physical activity, Tobacco-use cessation, social engagement, weight loss and cognition.  Checklist reviewing preventive services available has been given to the patient.  Reviewed patient's diet, addressing concerns and/or questions.   She is at risk for lack of exercise and has been provided with information to increase physical activity for the benefit of her well-being.   She is at risk for psychosocial distress and has been provided with information to reduce risk.           Alonzo Whitley is a 59 year old, presenting for the following:  Physical        4/23/2025     " 1:05 PM   Additional Questions   Roomed by Nicole SLOAN   Accompanied by Self         4/23/2025     1:05 PM   Patient Reported Additional Medications   Patient reports taking the following new medications NA          Healthy Habits:     Getting at least 3 servings of Calcium per day:  Yes    Bi-annual eye exam:  Yes    Dental care twice a year:  Yes    Sleep apnea or symptoms of sleep apnea:  Sleep apnea    Diet:  Carbohydrate counting    Frequency of exercise:  1 day/week    Duration of exercise:  Less than 15 minutes    Taking medications regularly:  Not Applicable    Barriers to taking medications:  Not applicable    Medication side effects:  Not applicable    Additional concerns today:  No (fell (severe low back pain), hearing referral)    Annual exam  Had hysterectomy- has not had pap since. Was benign reason  Decreased hearing  Hurt back this week, fell roller skating. Does not want to address in  physical. No numbness, tingling or weakness., no loss of bowel or bladder          Advance Care Planning    Patient states has Health Care Directive and will send to [facility] [employer].        4/23/2025   General Health   How would you rate your overall physical health? Excellent   Feel stress (tense, anxious, or unable to sleep) To some extent   (!) STRESS CONCERN      4/23/2025   Nutrition   Three or more servings of calcium each day? Yes   Diet: Carbohydrate counting   How many servings of fruit and vegetables per day? (!) 0-1   How many sweetened beverages each day? 0-1         4/23/2025   Exercise   Days per week of moderate/strenous exercise 1 day   (!) EXERCISE CONCERN      4/23/2025   Social Factors   Frequency of gathering with friends or relatives Once a week   Worry food won't last until get money to buy more No   Food not last or not have enough money for food? No   Do you have housing? (Housing is defined as stable permanent housing and does not include staying outside in a car, in a tent, in an abandoned  building, in an overnight shelter, or couch-surfing.) Yes   Are you worried about losing your housing? No   Lack of transportation? No   Unable to get utilities (heat,electricity)? No         4/23/2025   Fall Risk   Fallen 2 or more times in the past year? No   Trouble with walking or balance? No          4/23/2025   Dental   Dentist two times every year? Yes         Today's PHQ-9 Score:       1/20/2025     5:30 PM   PHQ-9 SCORE   PHQ-9 Total Score MyChart 3 (Minimal depression)   PHQ-9 Total Score 3        Patient-reported           4/23/2025   Substance Use   Alcohol more than 3/day or more than 7/wk No   Do you use any other substances recreationally? No     Social History     Tobacco Use    Smoking status: Never     Passive exposure: Never    Smokeless tobacco: Never   Vaping Use    Vaping status: Never Used   Substance Use Topics    Alcohol use: Yes     Comment: 1/every two weeks    Drug use: Never           5/7/2024   LAST FHS-7 RESULTS   1st degree relative breast or ovarian cancer No   Any relative bilateral breast cancer No   Any male have breast cancer No   Any ONE woman have BOTH breast AND ovarian cancer No   Any woman with breast cancer before 50yrs No   2 or more relatives with breast AND/OR ovarian cancer No   2 or more relatives with breast AND/OR bowel cancer No        Mammogram Screening - Mammography discussed and declined        4/23/2025   STI Screening   New sexual partner(s) since last STI/HIV test? No     History of abnormal Pap smear: YES - other categories - see link Cervical Cytology Screening Guidelines        2/22/2013     3:07 PM   PAP / HPV   PAP (Historical) NIL      ASCVD Risk   The 10-year ASCVD risk score (Gerson BACON, et al., 2019) is: 2.6%    Values used to calculate the score:      Age: 59 years      Sex: Female      Is Non- : No      Diabetic: No      Tobacco smoker: No      Systolic Blood Pressure: 121 mmHg      Is BP treated: No      HDL  "Cholesterol: 78 mg/dL      Total Cholesterol: 225 mg/dL           Reviewed and updated as needed this visit by Provider     Meds    Surg Hx  Fam Hx                     Objective    Exam  /79 (BP Location: Right arm, Patient Position: Sitting, Cuff Size: Adult Regular)   Pulse 72   Temp 97.8  F (36.6  C) (Oral)   Resp 16   Ht 1.736 m (5' 8.35\")   Wt 77.2 kg (170 lb 3.2 oz)   LMP  (LMP Unknown)   SpO2 97%   BMI 25.62 kg/m     Estimated body mass index is 25.62 kg/m  as calculated from the following:    Height as of this encounter: 1.736 m (5' 8.35\").    Weight as of this encounter: 77.2 kg (170 lb 3.2 oz).    Physical Exam  GENERAL: alert and no distress  EYES: Eyes grossly normal to inspection, PERRL and conjunctivae and sclerae normal  HENT: ear canals and TM's normal, nose and mouth without ulcers or lesions  NECK: no adenopathy, no asymmetry, masses, or scars  RESP: lungs clear to auscultation - no rales, rhonchi or wheezes  CV: regular rate and rhythm, normal S1 S2, no S3 or S4, no murmur, click or rub, no peripheral edema  ABDOMEN: soft, nontender, no hepatosplenomegaly, no masses and bowel sounds normal  MS: no gross musculoskeletal defects noted. Slow movement sit to stand due to back pain  SKIN: no suspicious lesions or rashes  NEURO: Normal strength and tone, mentation intact and speech normal  PSYCH: mentation appears normal, affect normal/bright        Signed Electronically by: Jessika Mcghee PA-C    "

## 2025-04-23 NOTE — PATIENT INSTRUCTIONS
Patient Education   Preventive Care Advice   This is general advice given by our system to help you stay healthy. However, your care team may have specific advice just for you. Please talk to your care team about your preventive care needs.  Nutrition  Eat 5 or more servings of fruits and vegetables each day.  Try wheat bread, brown rice and whole grain pasta (instead of white bread, rice, and pasta).  Get enough calcium and vitamin D. Check the label on foods and aim for 100% of the RDA (recommended daily allowance).  Lifestyle  Exercise at least 150 minutes each week  (30 minutes a day, 5 days a week).  Do muscle strengthening activities 2 days a week. These help control your weight and prevent disease.  No smoking.  Wear sunscreen to prevent skin cancer.  Have a dental exam and cleaning every 6 months.  Yearly exams  See your health care team every year to talk about:  Any changes in your health.  Any medicines your care team has prescribed.  Preventive care, family planning, and ways to prevent chronic diseases.  Shots (vaccines)   HPV shots (up to age 26), if you've never had them before.  Hepatitis B shots (up to age 59), if you've never had them before.  COVID-19 shot: Get this shot when it's due.  Flu shot: Get a flu shot every year.  Tetanus shot: Get a tetanus shot every 10 years.  Pneumococcal, hepatitis A, and RSV shots: Ask your care team if you need these based on your risk.  Shingles shot (for age 50 and up)  General health tests  Diabetes screening:  Starting at age 35, Get screened for diabetes at least every 3 years.  If you are younger than age 35, ask your care team if you should be screened for diabetes.  Cholesterol test: At age 39, start having a cholesterol test every 5 years, or more often if advised.  Bone density scan (DEXA): At age 50, ask your care team if you should have this scan for osteoporosis (brittle bones).  Hepatitis C: Get tested at least once in your life.  STIs (sexually  transmitted infections)  Before age 24: Ask your care team if you should be screened for STIs.  After age 24: Get screened for STIs if you're at risk. You are at risk for STIs (including HIV) if:  You are sexually active with more than one person.  You don't use condoms every time.  You or a partner was diagnosed with a sexually transmitted infection.  If you are at risk for HIV, ask about PrEP medicine to prevent HIV.  Get tested for HIV at least once in your life, whether you are at risk for HIV or not.  Cancer screening tests  Cervical cancer screening: If you have a cervix, begin getting regular cervical cancer screening tests starting at age 21.  Breast cancer scan (mammogram): If you've ever had breasts, begin having regular mammograms starting at age 40. This is a scan to check for breast cancer.  Colon cancer screening: It is important to start screening for colon cancer at age 45.  Have a colonoscopy test every 10 years (or more often if you're at risk) Or, ask your provider about stool tests like a FIT test every year or Cologuard test every 3 years.  To learn more about your testing options, visit:   .  For help making a decision, visit:   https://bit.ly/pg70280.  Prostate cancer screening test: If you have a prostate, ask your care team if a prostate cancer screening test (PSA) at age 55 is right for you.  Lung cancer screening: If you are a current or former smoker ages 50 to 80, ask your care team if ongoing lung cancer screenings are right for you.  For informational purposes only. Not to replace the advice of your health care provider. Copyright   2023 [facility]. All rights reserved. Clinically reviewed by the [facility] Transitions Program. [employer] 947706 - REV 01/24.  Learning About Stress  What is stress?     Stress is your body's response to a hard situation. Your body can have a physical, emotional, or mental response. Stress is a fact of life for most people, and it  affects everyone differently. What causes stress for you may not be stressful for someone else.  A lot of things can cause stress. You may feel stress when you go on a job interview, take a test, or run a race. This kind of short-term stress is normal and even useful. It can help you if you need to work hard or react quickly. For example, stress can help you finish an important job on time.  Long-term stress is caused by ongoing stressful situations or events. Examples of long-term stress include long-term health problems, ongoing problems at work, or conflicts in your family. Long-term stress can harm your health.  How does stress affect your health?  When you are stressed, your body responds as though you are in danger. It makes hormones that speed up your heart, make you breathe faster, and give you a burst of energy. This is called the fight-or-flight stress response. If the stress is over quickly, your body goes back to normal and no harm is done.  But if stress happens too often or lasts too long, it can have bad effects. Long-term stress can make you more likely to get sick, and it can make symptoms of some diseases worse. If you tense up when you are stressed, you may develop neck, shoulder, or low back pain. Stress is linked to high blood pressure and heart disease.  Stress also harms your emotional health. It can make you goyal, tense, or depressed. Your relationships may suffer, and you may not do well at work or school.  What can you do to manage stress?  You can try these things to help manage stress:   Do something active. Exercise or activity can help reduce stress. Walking is a great way to get started. Even everyday activities such as housecleaning or yard work can help.  Try yoga or traci chi. These techniques combine exercise and meditation. You may need some training at first to learn them.  Do something you enjoy. For example, listen to music or go to a movie. Practice your hobby or do volunteer  "work.  Meditate. This can help you relax, because you are not worrying about what happened before or what may happen in the future.  Do guided imagery. Imagine yourself in any setting that helps you feel calm. You can use online videos, books, or a teacher to guide you.  Do breathing exercises. For example:  From a standing position, bend forward from the waist with your knees slightly bent. Let your arms dangle close to the floor.  Breathe in slowly and deeply as you return to a standing position. Roll up slowly and lift your head last.  Hold your breath for just a few seconds in the standing position.  Breathe out slowly and bend forward from the waist.  Let your feelings out. Talk, laugh, cry, and express anger when you need to. Talking with supportive friends or family, a counselor, or a conner leader about your feelings is a healthy way to relieve stress. Avoid discussing your feelings with people who make you feel worse.  Write. It may help to write about things that are bothering you. This helps you find out how much stress you feel and what is causing it. When you know this, you can find better ways to cope.  What can you do to prevent stress?  You might try some of these things to help prevent stress:  Manage your time. This helps you find time to do the things you want and need to do.  Get enough sleep. Your body recovers from the stresses of the day while you are sleeping.  Get support. Your family, friends, and community can make a difference in how you experience stress.  Limit your news feed. Avoid or limit time on social media or news that may make you feel stressed.  Do something active. Exercise or activity can help reduce stress. Walking is a great way to get started.  Where can you learn more?  Go to https://www.[employer].net/patiented  Enter N032 in the search box to learn more about \"Learning About Stress.\"  Current as of: October 24, 2024  Content Version: 14.4 2024-2025 [employer].   Care instructions adapted under license by your healthcare professional. If you have questions about a medical condition or this instruction, always ask your healthcare professional. [employer], [employer] disclaims any warranty or liability for your use of this information.

## 2025-04-24 LAB
CHOLEST SERPL-MCNC: 195 MG/DL
FASTING STATUS PATIENT QL REPORTED: ABNORMAL
HDLC SERPL-MCNC: 63 MG/DL
LDLC SERPL CALC-MCNC: 111 MG/DL
NONHDLC SERPL-MCNC: 132 MG/DL
TRIGL SERPL-MCNC: 103 MG/DL

## 2025-04-29 ENCOUNTER — OFFICE VISIT (OUTPATIENT)
Dept: AUDIOLOGY | Facility: CLINIC | Age: 60
End: 2025-04-29
Attending: PHYSICIAN ASSISTANT
Payer: COMMERCIAL

## 2025-04-29 DIAGNOSIS — H93.11 TINNITUS OF RIGHT EAR: ICD-10-CM

## 2025-04-29 DIAGNOSIS — H91.92 DECREASED HEARING OF LEFT EAR: ICD-10-CM

## 2025-04-29 DIAGNOSIS — H90.42 SENSORINEURAL HEARING LOSS (SNHL) OF LEFT EAR WITH UNRESTRICTED HEARING OF RIGHT EAR: Primary | ICD-10-CM

## 2025-04-29 PROCEDURE — 92557 COMPREHENSIVE HEARING TEST: CPT | Performed by: AUDIOLOGIST

## 2025-04-29 NOTE — PROGRESS NOTES
AUDIOLOGY REPORT    SUBJECTIVE:  Angi Canales is a 59 year old female who was seen in the Audiology Clinic at the [facility] for audiologic evaluation, referred by Jessika Mcghee PA-C. The patient reported long-term, progressive hearing loss in her left ear only over the past ten years. She reported hearing testing was done elsewhere ten years ago, and no recommendations were made at that time/she was not seen by ENT. She is now affected by diminished hearing in the left ear which is impacting her communication ability. She endorsed occasional tinnitus in the right (contralateral) ear, which is not pulsatile or debilitating. She reported no tinnitus in the left ear. She denied otalgia, otorrhea, recent illness, aural fullness, dizziness/vertigo, or history of noise exposure. Additionally, there has been no history of ear infection or head injury.     OBJECTIVE:    Falls risk/abuse screen completed by primary care 4-23-25.    Otoscopic exam indicates ears are clear of cerumen, bilaterally. Jewelry/piercing present in right keren bowl; this was able to be manipulated satisfactorily for test transducers and otoscopy.     Pure Tone Thresholds assessed using conventional audiometry with good  reliability from 250-8000 Hz bilaterally using insert earphones and circumaural headphones.     RIGHT:  normal hearing sensitivity    LEFT:    moderate severe rising to mild sensorineural hearing loss for 250-1000 Hz, rising to normal/borderline normal for 6080-9371 Hz    Tympanograms and acoustic reflexes could not be assessed, secondary to equipment malfunction.    Speech Reception Threshold:    RIGHT: 10 dB HL    LEFT:   25 dB HL  Word Recognition Score:     RIGHT: 100% at 50 dB HL using NU-6 recorded word list.    LEFT:   88% at 80 dB HL using NU-6 recorded word list.      ASSESSMENT:     ICD-10-CM    1. Sensorineural hearing loss (SNHL) of left ear with unrestricted hearing of right ear  H90.42        2. Decreased hearing of left ear  H91.92 Adult Audiology  Referral      3. Tinnitus of right ear  H93.11           Today s results were discussed with the patient in detail. Appropriate communication strategies were discussed at length, as were reasonable expectations regarding hearing at a distance, in noisy environments, or when attention is diverted elsewhere.    Ms. Canales should be seen by ENT, due to hearing asymmetry measured between ears and the unusual, low-frequency sensorineural hearing loss measured in the left ear only. Ms. Canales would be a candidate for monaural (left ear only) amplification, pending medical clearance and if patient motivation exists.    PLAN:  Patient was counseled regarding hearing loss and impact on communication. Ms. Canales should return for hearing evaluation per medical management (or minimally in 1-2 years) to monitor her hearing loss. Wear hearing protection consistently in noise to preserve residual hearing sensitivity and to minimize the effects of noise on tinnitus. Ms. Canales expressed verbal understanding of this information and plan. Please call this clinic with questions regarding these results or recommendations.        Maida Quiñones, [facility]  [place] Licensed Audiologist [id number]

## 2025-05-05 ENCOUNTER — ANCILLARY PROCEDURE (OUTPATIENT)
Dept: GENERAL RADIOLOGY | Facility: CLINIC | Age: 60
End: 2025-05-05
Attending: NURSE PRACTITIONER
Payer: COMMERCIAL

## 2025-05-05 ENCOUNTER — OFFICE VISIT (OUTPATIENT)
Dept: PEDIATRICS | Facility: CLINIC | Age: 60
End: 2025-05-05
Payer: COMMERCIAL

## 2025-05-05 VITALS
HEART RATE: 77 BPM | HEIGHT: 68 IN | DIASTOLIC BLOOD PRESSURE: 70 MMHG | OXYGEN SATURATION: 98 % | WEIGHT: 169.8 LBS | SYSTOLIC BLOOD PRESSURE: 107 MMHG | RESPIRATION RATE: 16 BRPM | BODY MASS INDEX: 25.73 KG/M2 | TEMPERATURE: 98.5 F
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
## 2025-05-05 DIAGNOSIS — M54.50 ACUTE BILATERAL LOW BACK PAIN WITHOUT SCIATICA: ICD-10-CM
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
## 2025-05-05 PROCEDURE — G2211 COMPLEX E/M VISIT ADD ON: HCPCS | Performed by: NURSE PRACTITIONER

## 2025-05-05 PROCEDURE — 99213 OFFICE O/P EST LOW 20 MIN: CPT | Performed by: NURSE PRACTITIONER

## 2025-05-05 PROCEDURE — 72100 X-RAY EXAM L-S SPINE 2/3 VWS: CPT | Mod: TC | Performed by: RADIOLOGY

## 2025-05-05 PROCEDURE — 3074F SYST BP LT 130 MM HG: CPT | Performed by: NURSE PRACTITIONER

## 2025-05-05 PROCEDURE — 3078F DIAST BP <80 MM HG: CPT | Performed by: NURSE PRACTITIONER

## 2025-05-05 PROCEDURE — 1125F AMNT PAIN NOTED PAIN PRSNT: CPT | Performed by: NURSE PRACTITIONER

## 2025-05-05 ASSESSMENT — ENCOUNTER SYMPTOMS
PERIANAL NUMBNESS: 0
ABDOMINAL PAIN: 0
DYSURIA: 0
TINGLING: 0
WEIGHT LOSS: 0
PARESIS: 0
FEVER: 0
BOWEL INCONTINENCE: 0
WEAKNESS: 0
NUMBNESS: 0
LEG PAIN: 0
PARESTHESIAS: 0
HEADACHES: 0
BACK PAIN: 1
ABDOMINAL SWELLING: 0

## 2025-05-05 ASSESSMENT — PAIN SCALES - GENERAL: PAINLEVEL_OUTOF10: MODERATE PAIN (5)

## 2025-05-05 NOTE — PROGRESS NOTES
Assessment & Plan     Fall involving roller skates as cause of accidental injury  Acute bilateral low back pain without sciatica  No acute findings or fracture on imaging. Referral to PT and follow-up if not improving as expected.  - XR Lumbar Spine 2/3 Views; Future  - Physical Therapy  Referral; Future    The longitudinal plan of care for the diagnosis(es)/condition(s) as documented were addressed during this visit. Due to the added complexity in care, I will continue to support Angi in the subsequent management and with ongoing continuity of care in partnership with PCP            Subjective   Angi is a 59 year old, presenting for the following health issues:  Back Pain        5/5/2025     7:58 AM   Additional Questions   Roomed by Nicole SLOAN   Accompanied by Self         5/5/2025     7:58 AM   Patient Reported Additional Medications   Patient reports taking the following new medications NA     History of Present Illness       Back Pain:  She presents for follow up of back pain. Patient's back pain is a new problem.    Original cause of back pain: a fall  First noticed back pain: 1-4 weeks ago  Patient feels back pain: constantlyLocation of back pain:  Left lower back  Description of back pain: gnawing  Back pain spreads: right buttocks and left buttocks    Since patient first noticed back pain, pain is: always present, but gets better and worse  Does back pain interfere with her job:  Yes  On a scale of 1-10 (10 being the worst), patient describes pain as:  6  What makes back pain worse: bending, certain positions, lying down, sitting, standing and twisting   Acupuncture: not tried  Acetaminophen: not tried  Activity or exercise: not helpful  Chiropractor:  Not tried  Cold: not tried  Heat: not tried  Massage: not tried  Muscle relaxants: not tried  NSAIDS: not tried  Opioids: not tried  Physical Therapy: not tried  Rest: helpful  Steroid Injection: not tried  Stretching: not helpful  Surgery: not  "tried  TENS unit: not tried  Topical pain relievers: not tried  Other healthcare providers patient is seeing for back pain: None    Reason for visit:  Back pain from pain April 21st  Symptom onset:  1-2 weeks ago  Symptoms include:  Pain  Symptom intensity:  Moderate  Symptom progression:  Improving  Had these symptoms before:  No  Prior treatment description:  NA  What makes it worse:  Movement  What makes it better:  Rest    She eats 2-3 servings of fruits and vegetables daily.She consumes 1 sweetened beverage(s) daily.She exercises with enough effort to increase her heart rate 20 to 29 minutes per day.  She exercises with enough effort to increase her heart rate 4 days per week.   She is taking medications regularly.        2 weeks ago  Fell on low back while roller skating  Pain is improving, really bad for a couple days, and now continues to have a dull pain that is worsened by walking, moving, sitting too long. Sometimes even pain at rest  Constant low back ache  Worst end of day after moving a lot  Best in the morning  No radiating pain            Objective    /70 (BP Location: Right arm, Patient Position: Sitting, Cuff Size: Adult Regular)   Pulse 77   Temp 98.5  F (36.9  C) (Oral)   Resp 16   Ht 1.727 m (5' 8\")   Wt 77 kg (169 lb 12.8 oz)   LMP  (LMP Unknown)   SpO2 98%   BMI 25.82 kg/m    Body mass index is 25.82 kg/m .  Physical Exam   GENERAL: alert and no distress  MSK: No obvious deformity of spine. No bruising, redness, or masses. Spine, SI joint, and paraspinal muscles non-tender to palpation.  NEURO: +2 DTRs of lower extremities. Negative SLR.             Signed Electronically by: Emely Rachel NP    "

## 2025-05-19 ENCOUNTER — ANCILLARY PROCEDURE (OUTPATIENT)
Dept: BONE DENSITY | Facility: CLINIC | Age: 60
End: 2025-05-19
Attending: PHYSICIAN ASSISTANT
Payer: COMMERCIAL

## 2025-05-19 DIAGNOSIS — Z78.0 POSTMENOPAUSAL STATUS: ICD-10-CM

## 2025-05-19 PROCEDURE — 77080 DXA BONE DENSITY AXIAL: CPT | Mod: TC | Performed by: PHYSICIAN ASSISTANT

## 2025-05-21 ENCOUNTER — RESULTS FOLLOW-UP (OUTPATIENT)
Dept: PEDIATRICS | Facility: CLINIC | Age: 60
End: 2025-05-21

## 2025-07-10 ENCOUNTER — OFFICE VISIT (OUTPATIENT)
Dept: INTERNAL MEDICINE | Facility: CLINIC | Age: 60
End: 2025-07-10
Payer: COMMERCIAL

## 2025-07-10 VITALS
OXYGEN SATURATION: 99 % | HEART RATE: 77 BPM | DIASTOLIC BLOOD PRESSURE: 67 MMHG | SYSTOLIC BLOOD PRESSURE: 100 MMHG | BODY MASS INDEX: 24.04 KG/M2 | TEMPERATURE: 97.1 F | WEIGHT: 158.6 LBS | HEIGHT: 68 IN

## 2025-07-10 DIAGNOSIS — S83.412A SPRAIN OF MEDIAL COLLATERAL LIGAMENT OF LEFT KNEE, INITIAL ENCOUNTER: Primary | ICD-10-CM

## 2025-07-10 PROCEDURE — 99213 OFFICE O/P EST LOW 20 MIN: CPT | Performed by: INTERNAL MEDICINE

## 2025-07-10 PROCEDURE — 3074F SYST BP LT 130 MM HG: CPT | Performed by: INTERNAL MEDICINE

## 2025-07-10 PROCEDURE — 3078F DIAST BP <80 MM HG: CPT | Performed by: INTERNAL MEDICINE

## 2025-07-10 ASSESSMENT — PATIENT HEALTH QUESTIONNAIRE - PHQ9
SUM OF ALL RESPONSES TO PHQ QUESTIONS 1-9: 5
SUM OF ALL RESPONSES TO PHQ QUESTIONS 1-9: 5
10. IF YOU CHECKED OFF ANY PROBLEMS, HOW DIFFICULT HAVE THESE PROBLEMS MADE IT FOR YOU TO DO YOUR WORK, TAKE CARE OF THINGS AT HOME, OR GET ALONG WITH OTHER PEOPLE: SOMEWHAT DIFFICULT

## 2025-07-10 NOTE — TELEPHONE ENCOUNTER
FUTURE VISIT INFORMATION:  Appointment Date: 10/8/25  Appointment Time: 8 AM    REFERRAL INFORMATION:  Referring By: Jessika Mcghee PA-C   Referring Clinic: [facility]   Reason for Visit/Diagnosis: Presbycusis of both ears, ref'd by Jessika Mcghee PA-C, pt made appt, [facility] location      NOTES STATUS DETAILS   OFFICE NOTE from referring provider Epic [facility]   5/1/25 mychart: Jessika Mcghee PA-C    EAR     AUDIOLOGY NOTES [facility] AUDIOLOGY   4/29/25 OV: Angie Jolley AuD    AUDIOGRAM, TYMPANOGRAM  *graph / tracing raw data* Epic 4/29/25 audiogram   IMAGES *pertaining images & report*       CT, MRI, PET, NM, US, XRAYS, etc ... PACS  [facility]   5/23/23 MR brain

## 2025-07-10 NOTE — PROGRESS NOTES
"  {PROVIDER CHARTING PREFERENCE:230633}    Subjective   Angi is a 59 year old, presenting for the following health issues:  Knee Pain (left)  Pt has been dealing with knee pain for about a month    She has had pain in the left knee for the past month. She doesn't notice the pain during the day but it quite disruptive at night, keeps her up. No notable injury at onset.    Knee Pain    History of Present Illness       Reason for visit:  Left knee pain         {MA/LPN/RN Pre-Provider Visit Orders- hCG/UA/Strep (Optional):808989}  {SUPERLIST (Optional):264362}  {additonal problems for provider to add (Optional):120462}    {ROS Picklists (Optional):145141}      Objective    /67   Pulse 77   Temp 97.1  F (36.2  C) (Temporal)   Ht 1.727 m (5' 8\")   Wt 71.9 kg (158 lb 9.6 oz)   LMP  (LMP Unknown)   SpO2 99%   BMI 24.12 kg/m    Body mass index is 24.12 kg/m .  Physical Exam   {Exam List (Optional):969848}    {Diagnostic Test Results (Optional):045115}        Signed Electronically by: Rebekah Fuentes MD  {Email feedback regarding this note to [email or web address]   :[id number]}  "

## 2025-08-12 ENCOUNTER — OFFICE VISIT (OUTPATIENT)
Dept: INTERNAL MEDICINE | Facility: CLINIC | Age: 60
End: 2025-08-12
Payer: COMMERCIAL

## 2025-08-12 ENCOUNTER — ANCILLARY PROCEDURE (OUTPATIENT)
Dept: GENERAL RADIOLOGY | Facility: CLINIC | Age: 60
End: 2025-08-12
Attending: PHYSICIAN ASSISTANT
Payer: COMMERCIAL

## 2025-08-12 VITALS
RESPIRATION RATE: 16 BRPM | DIASTOLIC BLOOD PRESSURE: 70 MMHG | WEIGHT: 157.8 LBS | HEART RATE: 74 BPM | SYSTOLIC BLOOD PRESSURE: 102 MMHG | BODY MASS INDEX: 23.92 KG/M2 | HEIGHT: 68 IN | TEMPERATURE: 98.1 F | OXYGEN SATURATION: 98 %

## 2025-08-12 DIAGNOSIS — M25.562 CHRONIC PAIN OF LEFT KNEE: Primary | ICD-10-CM

## 2025-08-12 DIAGNOSIS — M25.562 CHRONIC PAIN OF LEFT KNEE: ICD-10-CM

## 2025-08-12 DIAGNOSIS — M25.562 MEDIAL KNEE PAIN, LEFT: ICD-10-CM

## 2025-08-12 DIAGNOSIS — G89.29 CHRONIC PAIN OF LEFT KNEE: ICD-10-CM

## 2025-08-12 DIAGNOSIS — G89.29 CHRONIC PAIN OF LEFT KNEE: Primary | ICD-10-CM

## 2025-08-12 PROCEDURE — 99213 OFFICE O/P EST LOW 20 MIN: CPT | Performed by: PHYSICIAN ASSISTANT

## 2025-08-12 PROCEDURE — 73562 X-RAY EXAM OF KNEE 3: CPT | Mod: TC | Performed by: INTERNAL MEDICINE

## 2025-08-12 PROCEDURE — 3078F DIAST BP <80 MM HG: CPT | Performed by: PHYSICIAN ASSISTANT

## 2025-08-12 PROCEDURE — 1125F AMNT PAIN NOTED PAIN PRSNT: CPT | Performed by: PHYSICIAN ASSISTANT

## 2025-08-12 PROCEDURE — 3074F SYST BP LT 130 MM HG: CPT | Performed by: PHYSICIAN ASSISTANT

## 2025-08-12 ASSESSMENT — PAIN SCALES - GENERAL: PAINLEVEL_OUTOF10: MILD PAIN (2)

## 2025-08-19 ENCOUNTER — OFFICE VISIT (OUTPATIENT)
Dept: ORTHOPEDICS | Facility: CLINIC | Age: 60
End: 2025-08-19
Payer: COMMERCIAL

## 2025-08-19 DIAGNOSIS — S83.412A SPRAIN OF MEDIAL COLLATERAL LIGAMENT OF LEFT KNEE, INITIAL ENCOUNTER: Primary | ICD-10-CM

## 2025-08-19 PROCEDURE — 99204 OFFICE O/P NEW MOD 45 MIN: CPT | Performed by: STUDENT IN AN ORGANIZED HEALTH CARE EDUCATION/TRAINING PROGRAM

## 2025-08-19 RX ORDER — DICLOFENAC SODIUM 75 MG/1
75 TABLET, DELAYED RELEASE ORAL 2 TIMES DAILY
Qty: 60 TABLET | Refills: 0 | Status: SHIPPED | OUTPATIENT
Start: 2025-08-19

## 2025-10-08 ENCOUNTER — PRE VISIT (OUTPATIENT)
Dept: OTOLARYNGOLOGY | Facility: CLINIC | Age: 60
End: 2025-10-08

## (undated) RX ORDER — FENTANYL CITRATE 50 UG/ML
INJECTION, SOLUTION INTRAMUSCULAR; INTRAVENOUS
Status: DISPENSED
Start: 2024-01-10